# Patient Record
Sex: FEMALE | Race: BLACK OR AFRICAN AMERICAN | Employment: OTHER | URBAN - METROPOLITAN AREA
[De-identification: names, ages, dates, MRNs, and addresses within clinical notes are randomized per-mention and may not be internally consistent; named-entity substitution may affect disease eponyms.]

---

## 2017-01-05 ENCOUNTER — HOSPITAL ENCOUNTER (OUTPATIENT)
Dept: PHYSICAL THERAPY | Age: 62
Discharge: HOME | End: 2017-01-05
Payer: COMMERCIAL

## 2017-01-05 PROCEDURE — 97113 AQUATIC THERAPY/EXERCISES: CPT

## 2017-01-05 NOTE — PROGRESS NOTES
[facility] - [facility], [address] [place] [address]  [phone] fax    Progress Note  Patient name: Steven Nathan Start of Care: 2016   Referral source: Rach Guerrero MD : 1955    Medical Diagnosis: Chronic SI joint pain [M53.3, G89.29]  Lumbar pain [M54.5]  Lumbar radiculopathy [M54.16] Onset Date:Chronic, worsening past few months    Treatment Diagnosis: Low back, LE pain   Prior Hospitalization: see medical history Provider#: [id number]   Medications: Verified on Patient summary List   Comorbidities: arthritis, back pain, BMI over 30, diabetes, gastrointestinal disease, visual impairment  Prior Level of Function: Functionally independent, retired, helps out at [employer] center    Visits from [facility] of Care: 5    Missed Visits: 1    Established Goals:          Excellent Good         Limited           None  [x] Increased ROM   []  []  [x]  []  [x] Increased Strength  []  []  [x]  []  [x] Increased Mobility  []  []  [x]  []   [x] Decreased Pain   []  []  [x]  []  [] Decreased Swelling  []  []  []  []     Goal: Patient will initiate aquatic therapy without incident or increase in pain in order to progress toward long term goals. Status at last note/certification: N/A  Current status: Met  Long Term Goals: To be accomplished in 10 treatments:  Goal: Patient will improve FOTO assessment score to 51 in order to indicate improved functional abilities. Status at last note/certification: 33  Current status: Not met, 27  Goal: Patient will improve lumbar flexion and Left rotation AROM to WNL without increase in pain in order to more easily perform ADL.   Status at last note/certification: 11% of full with increased back/Left LE pain  Current status: Progressing, remains painful and limited  Goal: Patient will improve Left knee flexion/extension and hip flexion strength to 5/5 without increase in pain in order to improve stability with weight bearing activities. Status at last note/certification: knee extension 4+/5, flexion 4-/5, hip flexion 4/5 with pain  Current status: Progressing, knee flexion 5/5, knee extension 4/5, hip flexion 4-/5 all with pain  Goal: Patient will report worst back and leg pain as 6/10 or less in order to improve quality of sleep.   Status at last note/certification: 7/92  Current status: Progressing, 8.5/10    Key Functional Changes:   Functional Gains: some decreased pain at start of therapy  Functional Deficits: pain with movement, pain with stand/amb, pain with bending during ADL, occasionally requires help with ADL, recent increase in pain following operation for cancer biopsy, pain when in bed/recliner, pain into both LEs (was previously just Left but now into Right)  % improvement: limited  Pain   Average: 4.5/10       Best: 6-8/10     Worst: 8.5/10  Patient Goal: \"To be able to function like I did before\"    Updated Goals: to be achieved in 5 weeks:   Long term goals remain appropriate     ASSESSMENT/RECOMMENDATIONS:  [x]Continue therapy per initial plan/protocol at a frequency of  2 x per week for 5 weeks  []Continue therapy with the following recommended changes:_____________________      _____________________________________________________________________  []Discontinue therapy progressing towards or have reached established goals  []Discontinue therapy due to lack of appreciable progress towards goals  []Discontinue therapy due to lack of attendance or compliance  []Await Physician's recommendations/decisions regarding therapy  []Other:________________________________________________________________    Thank you for this referral.   Randy Pineda, PT 1/5/2017 1:01 PM  NOTE TO PHYSICIAN:  PLEASE COMPLETE THE ORDERS BELOW AND   FAX TO [facility]: [phone]  If you are unable to process this request in 24 hours please contact our office: [phone]  []  I have read the above report and request that my patient continue as recommended. []  I have read the above report and request that my patient continue therapy with the following changes/special instructions:________________________________________  []I have read the above report and request that my patient be discharged from therapy.     Physicians signature: ______________________________Date: ______Time:______

## 2017-01-10 ENCOUNTER — APPOINTMENT (OUTPATIENT)
Dept: PHYSICAL THERAPY | Age: 62
End: 2017-01-10
Payer: COMMERCIAL

## 2017-01-16 NOTE — PROGRESS NOTES
[facility] - [place] [address]  [phone] fax    Discharge Summary    Progress Note  Patient name: Kacey Berumen Start of Care: 2016   Referral source: Leonard Yeager MD : 1955    Medical Diagnosis: Chronic SI joint pain [M53.3, G89.29]  Lumbar pain [M54.5]  Lumbar radiculopathy [M54.16] Onset Date:Chronic, worsening past few months    Treatment Diagnosis: Low back, LE pain   Prior Hospitalization: see medical history Provider#: [id number]   Medications: Verified on Patient summary List   Comorbidities: arthritis, back pain, BMI over 30, diabetes, gastrointestinal disease, visual impairment  Prior Level of Function: Functionally independent, retired, helps out at [employer] center    Visits from [place] of Care: 5    Missed Visits: 1    Reporting Period : 16 to 17    Goal: Patient will improve FOTO assessment score to 51 in order to indicate improved functional abilities. Status at last note/certification: 33  Current status: Not met, 27    Goal: Patient will improve lumbar flexion and Left rotation AROM to WNL without increase in pain in order to more easily perform ADL. Status at last note/certification: 35% of full with increased back/Left LE pain  Current status: Progressing, remains painful and limited    Goal: Patient will improve Left knee flexion/extension and hip flexion strength to 5/5 without increase in pain in order to improve stability with weight bearing activities. Status at last note/certification: knee extension 4+/5, flexion 4-/5, hip flexion 4/5 with pain  Current status: Progressing, knee flexion 5/5, knee extension 4/5, hip flexion 4-/5 all with pain    Goal: Patient will report worst back and leg pain as 6/10 or less in order to improve quality of sleep.   Status at last note/certification: 0/33  Current status: Progressing, 8.5/10      Assessment/ Summary of Care: Patient attended aquatic therapy inconsistently for low back and LE pain, with limited noticeable improvement at most recent reassessment. At this time MD requests discharge from therapy. Above goals reflect status at most recent session. Should Patient require further treatment in the future we would be pleased to treat her with an updated script. Thank you for the referral of this Patient.      RECOMMENDATIONS:  [x]Discontinue therapy: []Patient has reached or is progressing toward set goals      []Patient is non-compliant or has abdicated      [x]Due to lack of appreciable progress towards set [facility], PT 1/16/2017 11:54 AM

## 2017-02-01 ENCOUNTER — APPOINTMENT (OUTPATIENT)
Dept: CT IMAGING | Age: 62
End: 2017-02-01
Attending: PHYSICIAN ASSISTANT
Payer: COMMERCIAL

## 2017-02-01 ENCOUNTER — HOSPITAL ENCOUNTER (EMERGENCY)
Age: 62
Discharge: HOME | End: 2017-02-01
Attending: EMERGENCY MEDICINE
Payer: COMMERCIAL

## 2017-02-01 VITALS
SYSTOLIC BLOOD PRESSURE: 113 MMHG | TEMPERATURE: 97.9 F | OXYGEN SATURATION: 98 % | HEIGHT: 67 IN | DIASTOLIC BLOOD PRESSURE: 63 MMHG | BODY MASS INDEX: 36.1 KG/M2 | HEART RATE: 91 BPM | WEIGHT: 230 LBS | RESPIRATION RATE: 18 BRPM

## 2017-02-01 DIAGNOSIS — K85.90 ACUTE PANCREATITIS WITHOUT INFECTION OR NECROSIS, UNSPECIFIED PANCREATITIS TYPE: ICD-10-CM

## 2017-02-01 DIAGNOSIS — R10.84 ABDOMINAL PAIN, GENERALIZED: Primary | ICD-10-CM

## 2017-02-01 DIAGNOSIS — R11.2 NON-INTRACTABLE VOMITING WITH NAUSEA, UNSPECIFIED VOMITING TYPE: ICD-10-CM

## 2017-02-01 DIAGNOSIS — K59.00 CONSTIPATION, UNSPECIFIED CONSTIPATION TYPE: ICD-10-CM

## 2017-02-01 LAB
ALBUMIN SERPL BCP-MCNC: 3.7 G/DL (ref 3.4–5)
ALBUMIN/GLOB SERPL: 0.8 {RATIO} (ref 0.8–1.7)
ALP SERPL-CCNC: 144 U/L (ref 45–117)
ALT SERPL-CCNC: 29 U/L (ref 13–56)
ANION GAP BLD CALC-SCNC: 13 MMOL/L (ref 3–18)
APPEARANCE UR: CLEAR
AST SERPL W P-5'-P-CCNC: 16 U/L (ref 15–37)
BACTERIA URNS QL MICRO: ABNORMAL /HPF
BASOPHILS # BLD AUTO: 0 K/UL (ref 0–0.06)
BASOPHILS # BLD: 0 % (ref 0–2)
BILIRUB SERPL-MCNC: 0.3 MG/DL (ref 0.2–1)
BILIRUB UR QL: NEGATIVE
BUN SERPL-MCNC: 11 MG/DL (ref 7–18)
BUN/CREAT SERPL: 13 (ref 12–20)
CALCIUM SERPL-MCNC: 9.5 MG/DL (ref 8.5–10.1)
CHLORIDE SERPL-SCNC: 102 MMOL/L (ref 100–108)
CO2 SERPL-SCNC: 25 MMOL/L (ref 21–32)
COLOR UR: YELLOW
CREAT SERPL-MCNC: 0.85 MG/DL (ref 0.6–1.3)
DIFFERENTIAL METHOD BLD: ABNORMAL
EOSINOPHIL # BLD: 0.1 K/UL (ref 0–0.4)
EOSINOPHIL NFR BLD: 1 % (ref 0–5)
EPITH CASTS URNS QL MICRO: ABNORMAL /LPF (ref 0–5)
ERYTHROCYTE [DISTWIDTH] IN BLOOD BY AUTOMATED COUNT: 15.9 % (ref 11.6–14.5)
GLOBULIN SER CALC-MCNC: 4.7 G/DL (ref 2–4)
GLUCOSE SERPL-MCNC: 172 MG/DL (ref 74–99)
GLUCOSE UR STRIP.AUTO-MCNC: >1000 MG/DL
HCT VFR BLD AUTO: 42.1 % (ref 35–45)
HGB BLD-MCNC: 13.4 G/DL (ref 12–16)
HGB UR QL STRIP: NEGATIVE
KETONES UR QL STRIP.AUTO: NEGATIVE MG/DL
LEUKOCYTE ESTERASE UR QL STRIP.AUTO: ABNORMAL
LIPASE SERPL-CCNC: 1153 U/L (ref 73–393)
LYMPHOCYTES # BLD AUTO: 27 % (ref 21–52)
LYMPHOCYTES # BLD: 2.8 K/UL (ref 0.9–3.6)
MCH RBC QN AUTO: 27.1 PG (ref 24–34)
MCHC RBC AUTO-ENTMCNC: 31.8 G/DL (ref 31–37)
MCV RBC AUTO: 85.2 FL (ref 74–97)
MONOCYTES # BLD: 0.7 K/UL (ref 0.05–1.2)
MONOCYTES NFR BLD AUTO: 7 % (ref 3–10)
NEUTS SEG # BLD: 6.6 K/UL (ref 1.8–8)
NEUTS SEG NFR BLD AUTO: 65 % (ref 40–73)
NITRITE UR QL STRIP.AUTO: NEGATIVE
PH UR STRIP: 5 [PH] (ref 5–8)
PLATELET # BLD AUTO: 270 K/UL (ref 135–420)
PMV BLD AUTO: 11.2 FL (ref 9.2–11.8)
POTASSIUM SERPL-SCNC: 3.9 MMOL/L (ref 3.5–5.5)
PROT SERPL-MCNC: 8.4 G/DL (ref 6.4–8.2)
PROT UR STRIP-MCNC: NEGATIVE MG/DL
RBC # BLD AUTO: 4.94 M/UL (ref 4.2–5.3)
RBC #/AREA URNS HPF: ABNORMAL /HPF (ref 0–5)
SODIUM SERPL-SCNC: 140 MMOL/L (ref 136–145)
SP GR UR REFRACTOMETRY: >1.03 (ref 1–1.03)
UROBILINOGEN UR QL STRIP.AUTO: 0.2 EU/DL (ref 0.2–1)
WBC # BLD AUTO: 10.2 K/UL (ref 4.6–13.2)
WBC URNS QL MICRO: ABNORMAL /HPF (ref 0–4)

## 2017-02-01 PROCEDURE — 83690 ASSAY OF LIPASE: CPT | Performed by: PHYSICIAN ASSISTANT

## 2017-02-01 PROCEDURE — 87186 SC STD MICRODIL/AGAR DIL: CPT | Performed by: PHYSICIAN ASSISTANT

## 2017-02-01 PROCEDURE — 80053 COMPREHEN METABOLIC PANEL: CPT | Performed by: PHYSICIAN ASSISTANT

## 2017-02-01 PROCEDURE — 96374 THER/PROPH/DIAG INJ IV PUSH: CPT

## 2017-02-01 PROCEDURE — 99283 EMERGENCY DEPT VISIT LOW MDM: CPT

## 2017-02-01 PROCEDURE — 87077 CULTURE AEROBIC IDENTIFY: CPT | Performed by: PHYSICIAN ASSISTANT

## 2017-02-01 PROCEDURE — 96375 TX/PRO/DX INJ NEW DRUG ADDON: CPT

## 2017-02-01 PROCEDURE — 74011636320 HC RX REV CODE- 636/320: Performed by: EMERGENCY MEDICINE

## 2017-02-01 PROCEDURE — 74177 CT ABD & PELVIS W/CONTRAST: CPT

## 2017-02-01 PROCEDURE — 87086 URINE CULTURE/COLONY COUNT: CPT | Performed by: PHYSICIAN ASSISTANT

## 2017-02-01 PROCEDURE — 85025 COMPLETE CBC W/AUTO DIFF WBC: CPT | Performed by: PHYSICIAN ASSISTANT

## 2017-02-01 PROCEDURE — 96361 HYDRATE IV INFUSION ADD-ON: CPT

## 2017-02-01 PROCEDURE — 74011250636 HC RX REV CODE- 250/636: Performed by: PHYSICIAN ASSISTANT

## 2017-02-01 PROCEDURE — 74011250636 HC RX REV CODE- 250/636

## 2017-02-01 PROCEDURE — 81001 URINALYSIS AUTO W/SCOPE: CPT | Performed by: PHYSICIAN ASSISTANT

## 2017-02-01 PROCEDURE — 96376 TX/PRO/DX INJ SAME DRUG ADON: CPT

## 2017-02-01 RX ORDER — SODIUM CHLORIDE 9 MG/ML
1000 INJECTION, SOLUTION INTRAVENOUS ONCE
Status: COMPLETED | OUTPATIENT
Start: 2017-02-01 | End: 2017-02-01

## 2017-02-01 RX ORDER — MORPHINE SULFATE 4 MG/ML
4 INJECTION, SOLUTION INTRAMUSCULAR; INTRAVENOUS
Status: COMPLETED | OUTPATIENT
Start: 2017-02-01 | End: 2017-02-01

## 2017-02-01 RX ORDER — MORPHINE SULFATE 4 MG/ML
INJECTION, SOLUTION INTRAMUSCULAR; INTRAVENOUS
Status: DISCONTINUED
Start: 2017-02-01 | End: 2017-02-01 | Stop reason: HOSPADM

## 2017-02-01 RX ORDER — ONDANSETRON 2 MG/ML
4 INJECTION INTRAMUSCULAR; INTRAVENOUS
Status: COMPLETED | OUTPATIENT
Start: 2017-02-01 | End: 2017-02-01

## 2017-02-01 RX ORDER — MORPHINE SULFATE 2 MG/ML
2 INJECTION, SOLUTION INTRAMUSCULAR; INTRAVENOUS
Status: COMPLETED | OUTPATIENT
Start: 2017-02-01 | End: 2017-02-01

## 2017-02-01 RX ORDER — ONDANSETRON 2 MG/ML
INJECTION INTRAMUSCULAR; INTRAVENOUS
Status: DISCONTINUED
Start: 2017-02-01 | End: 2017-02-01 | Stop reason: HOSPADM

## 2017-02-01 RX ORDER — OXYCODONE AND ACETAMINOPHEN 10; 325 MG/1; MG/1
1 TABLET ORAL
COMMUNITY
End: 2017-02-01

## 2017-02-01 RX ORDER — POLYETHYLENE GLYCOL 3350 17 G/17G
17 POWDER, FOR SOLUTION ORAL DAILY
Qty: 289 G | Refills: 0 | Status: SHIPPED | OUTPATIENT
Start: 2017-02-01

## 2017-02-01 RX ORDER — ACETAMINOPHEN AND CODEINE PHOSPHATE 300; 30 MG/1; MG/1
1 TABLET ORAL
Qty: 20 TAB | Refills: 0 | Status: SHIPPED | OUTPATIENT
Start: 2017-02-01 | End: 2018-10-18 | Stop reason: ALTCHOICE

## 2017-02-01 RX ADMIN — ONDANSETRON HYDROCHLORIDE 4 MG: 2 SOLUTION INTRAMUSCULAR; INTRAVENOUS at 15:10

## 2017-02-01 RX ADMIN — MORPHINE SULFATE 4 MG: 4 INJECTION, SOLUTION INTRAMUSCULAR; INTRAVENOUS at 15:11

## 2017-02-01 RX ADMIN — SODIUM CHLORIDE 1000 ML: 900 INJECTION, SOLUTION INTRAVENOUS at 15:13

## 2017-02-01 RX ADMIN — Medication 2 MG: at 16:43

## 2017-02-01 RX ADMIN — IOPAMIDOL 100 ML: 612 INJECTION, SOLUTION INTRAVENOUS at 15:05

## 2017-02-01 NOTE — DISCHARGE INSTRUCTIONS
Abdominal Pain: Care Instructions  Your Care Instructions    Abdominal pain has many possible causes. Some aren't serious and get better on their own in a few days. Others need more testing and treatment. If your pain continues or gets worse, you need to be rechecked and may need more tests to find out what is wrong. You may need surgery to correct the problem. Don't ignore new symptoms, such as fever, nausea and vomiting, urination problems, pain that gets worse, and dizziness. These may be signs of a more serious problem. Your doctor may have recommended a follow-up visit in the next 8 to 12 hours. If you are not getting better, you may need more tests or treatment. The doctor has checked you carefully, but problems can develop later. If you notice any problems or new symptoms, get medical treatment right away. Follow-up care is a key part of your treatment and safety. Be sure to make and go to all appointments, and call your doctor if you are having problems. It's also a good idea to know your test results and keep a list of the medicines you take. How can you care for yourself at home? · Rest until you feel better. · To prevent dehydration, drink plenty of fluids, enough so that your urine is light yellow or clear like water. Choose water and other caffeine-free clear liquids until you feel better. If you have kidney, heart, or liver disease and have to limit fluids, talk with your doctor before you increase the amount of fluids you drink. · If your stomach is upset, eat mild foods, such as rice, dry toast or crackers, bananas, and applesauce. Try eating several small meals instead of two or three large ones. · Wait until 48 hours after all symptoms have gone away before you have spicy foods, alcohol, and drinks that contain caffeine. · Do not eat foods that are high in fat. · Avoid anti-inflammatory medicines such as aspirin, ibuprofen (Advil, Motrin), and naproxen (Aleve).  These can cause stomach upset. Talk to your doctor if you take daily aspirin for another health problem. When should you call for help? Call 911 anytime you think you may need emergency care. For example, call if:  · You passed out (lost consciousness). · You pass maroon or very bloody stools. · You vomit blood or what looks like coffee grounds. · You have new, severe belly pain. Call your doctor now or seek immediate medical care if:  · Your pain gets worse, especially if it becomes focused in one area of your belly. · You have a new or higher fever. · Your stools are black and look like tar, or they have streaks of blood. · You have unexpected vaginal bleeding. · You have symptoms of a urinary tract infection. These may include:  ¨ Pain when you urinate. ¨ Urinating more often than usual.  ¨ Blood in your urine. · You are dizzy or lightheaded, or you feel like you may faint. Watch closely for changes in your health, and be sure to contact your doctor if:  · You are not getting better after 1 day (24 hours). Where can you learn more? Go to http://dianeEpiphytealmas.info/. Enter X968 in the search box to learn more about \"Abdominal Pain: Care Instructions. \"  Current as of: May 27, 2016  Content Version: 11.1  © 4065-3490 [employer]. Care instructions adapted under license by [employer] (which disclaims liability or warranty for this information). If you have questions about a medical condition or this instruction, always ask your healthcare professional. [place] [address] any warranty or liability for your use of this information. Constipation: Care Instructions  Your Care Instructions  Constipation means that you have a hard time passing stools (bowel movements). People pass stools from 3 times a day to once every 3 days. What is normal for you may be different. Constipation may occur with pain in the rectum and cramping.  The pain may get worse when you try to pass stools. Sometimes there are small amounts of bright red blood on toilet paper or the surface of stools. This is because of enlarged veins near the rectum (hemorrhoids). A few changes in your diet and lifestyle may help you avoid ongoing constipation. Your doctor may also prescribe medicine to help loosen your stool. Some medicines can cause constipation. These include pain medicines and antidepressants. Tell your doctor about all the medicines you take. Your doctor may want to make a medicine change to ease your symptoms. Follow-up care is a key part of your treatment and safety. Be sure to make and go to all appointments, and call your doctor if you are having problems. It's also a good idea to know your test results and keep a list of the medicines you take. How can you care for yourself at home? · Drink plenty of fluids, enough so that your urine is light yellow or clear like water. If you have kidney, heart, or liver disease and have to limit fluids, talk with your doctor before you increase the amount of fluids you drink. · Include high-fiber foods in your diet each day. These include fruits, vegetables, beans, and whole grains. · Get at least 30 minutes of exercise on most days of the week. Walking is a good choice. You also may want to do other activities, such as running, swimming, cycling, or playing tennis or team sports. · Take a fiber supplement, such as Citrucel or Metamucil, every day. Read and follow all instructions on the label. · Schedule time each day for a bowel movement. A daily routine may help. Take your time having your bowel movement. · Support your feet with a small step stool when you sit on the toilet. This helps flex your hips and places your pelvis in a squatting position. · Your doctor may recommend an over-the-counter laxative to relieve your constipation. Examples are Milk of Magnesia and MiraLax. Read and follow all instructions on the label.  Do not use laxatives on a long-term basis. When should you call for help? Call your doctor now or seek immediate medical care if:  · You have new or worse belly pain. · You have new or worse nausea or vomiting. · You have blood in your stools. Watch closely for changes in your health, and be sure to contact your doctor if:  · Your constipation is getting worse. · You do not get better as expected. Where can you learn more? Go to http://diane-almas.info/. Enter [phone] in the search box to learn more about \"Constipation: Care Instructions. \"  Current as of: May 27, 2016  Content Version: 11.1  © 1611-5158 [employer]. Care instructions adapted under license by [employer] (which disclaims liability or warranty for this information). If you have questions about a medical condition or this instruction, always ask your healthcare professional. [place] [address] any warranty or liability for your use of this information. [employer] Activation    Thank you for requesting access to [employer]. Please follow the instructions below to securely access and download your online medical record. [employer] allows you to send messages to your doctor, view your test results, renew your prescriptions, schedule appointments, and more. How Do I Sign Up? 1. In your internet browser, go to [email or web address]  2. Click on the First Time User? Click Here link in the Sign In box. You will be redirect to the New Member Sign Up page. 3. Enter your [employer] Access Code exactly as it appears below. You will not need to use this code after youve completed the sign-up process. If you do not sign up before the expiration date, you must request a new code. [employer] Access Code: Activation code not generated  Current [employer] Status: Patient Declined (This is the date your [employer] access code will )    4.  Enter the last four digits of your Social Security Number (xxxx) and Date of Birth (mm/dd/yyyy) as indicated and click Submit. You will be taken to the next sign-up page. 5. Create a [employer] ID. This will be your [employer] login ID and cannot be changed, so think of one that is secure and easy to remember. 6. Create a [employer] password. You can change your password at any time. 7. Enter your Password Reset Question and Answer. This can be used at a later time if you forget your password. 8. Enter your e-mail address. You will receive e-mail notification when new information is available in [address] 9. Click Sign Up. You can now view and download portions of your medical record. 10. Click the Download Summary menu link to download a portable copy of your medical information. Additional Information    If you have questions, please visit the Frequently Asked Questions section of the [employer] website at [email or web address] Remember, [employer] is NOT to be used for urgent needs. For medical emergencies, dial 911.

## 2017-02-01 NOTE — Clinical Note
Complete all medications as prescribed. Follow-up with primary care doctor/GI in 1 week. Return to the ED immediately for any new or worsening symptoms.

## 2017-02-01 NOTE — ED TRIAGE NOTES
Pt presents to ER c/o diffuse abdominal pain, onset yesterday. Pt states she vomited x 1 yesterday. Pt states she had EGD by Dr Santo Kawasaki (GI) a few weeks ago. Pt states h/o +H pylori, completed course antibiotics. Denies diarrhea.

## 2017-02-01 NOTE — ED PROVIDER NOTES
HPI Comments: 63 yr old female, hx DM and insomnia presents to the ED complaining of generalized abdominal pain and N/V x 1 day. Pt states she was recently seen by Dr. Simin Khan, GI specialist, who treated her for an h pylori infection. Pt states she completed those abx several weeks ago. Denies diarrhea, constipation, urinary sx, fever, chills, SOB, and chest pain. No other complaints. Patient is a 63 y.o. female presenting with abdominal pain. Abdominal Pain    Associated symptoms include nausea and vomiting. Pertinent negatives include no fever, no diarrhea, no constipation, no dysuria, no frequency, no hematuria, no headaches, no myalgias, no chest pain and no back pain. Past Medical History:   Diagnosis Date    Cataract     Diabetes ([facility] [address]     Insomnia     Musculoskeletal disorder      herniated disk; bilat feet; left thumb;     Neuropathy        Past Surgical History:   Procedure Laterality Date    Hx cholecystectomy      Pr temporal artery ligatn or bx Bilateral 2016     Dr. Hans Coronado Hx  section      Hx hysterectomy      Hx orthopaedic  20+     left thumb    Hx rotator cuff repair Left          Family History:   Problem Relation Age of Onset    Diabetes Mother     Hypertension Mother     Diabetes Father     Hypertension Father     Diabetes Brother        Social History     Social History    Marital status:      Spouse name: N/A    Number of children: N/A    Years of education: N/A     Occupational History    Not on file. Social History Main Topics    Smoking status: Current Every Day Smoker     Packs/day: 0.50    Smokeless tobacco: Never Used    Alcohol use No    Drug use: No    Sexual activity: Not Currently     Other Topics Concern    Not on file     Social History Narrative         ALLERGIES: Pcn [penicillins]    Review of Systems   Constitutional: Negative for chills, diaphoresis, fatigue and fever.    HENT: Negative for congestion, ear pain, rhinorrhea and sore throat. Eyes: Negative for pain and redness. Respiratory: Negative for cough, shortness of breath, wheezing and stridor. Cardiovascular: Negative for chest pain, palpitations and leg swelling. Gastrointestinal: Positive for abdominal pain, nausea and vomiting. Negative for constipation and diarrhea. Genitourinary: Negative for dysuria, flank pain, frequency and hematuria. Musculoskeletal: Negative for back pain, myalgias, neck pain and neck stiffness. Skin: Negative for rash and wound. Neurological: Negative for dizziness, seizures, syncope, light-headedness, numbness and headaches. All other systems reviewed and are negative. Vitals:    02/01/17 1430   BP: 114/71   Pulse: 98   Resp: 20   Temp: 97.9 °F (36.6 °C)   SpO2: 96%   Weight: 104.3 kg (230 lb)   Height: 5' 7\" (1.702 m)            Physical Exam   Constitutional: She is oriented to person, place, and time. She appears well-developed and well-nourished. She appears distressed. Obese, appears moderately distressed   HENT:   Head: Normocephalic. Neck: Normal range of motion. Neck supple. Cardiovascular: Normal rate, regular rhythm and normal heart sounds. Exam reveals no gallop and no friction rub. No murmur heard. Pulmonary/Chest: Effort normal and breath sounds normal. No stridor. No respiratory distress. She has no wheezes. She has no rales. Abdominal: Soft. Bowel sounds are normal. She exhibits no distension and no mass. There is tenderness. There is guarding. There is no rebound. Diffuse abdominal TTP with guarding noted. Musculoskeletal: Normal range of motion. Neurological: She is alert and oriented to person, place, and time. Gait is steady. Able to ambulate without difficulty. Skin: Skin is warm and dry. No rash noted. She is not diaphoretic. No erythema. Psychiatric: She has a normal mood and affect.  Her behavior is normal. Thought content normal.   Nursing note and vitals reviewed. MDM  Number of Diagnoses or Management Options  Abdominal pain, generalized:   Acute pancreatitis without infection or necrosis, unspecified pancreatitis type:   Constipation, unspecified constipation type:   Non-intractable vomiting with nausea, unspecified vomiting type:   Diagnosis management comments: Impression:  Abdominal pain generalized, N/V, pancreatitis, constipation     IV inserted 1 L saline bolus, 4 mg zofran and 4 mg morphine given   RDW 15.9, glucose 179, GLOB 4.7, , TP 8.4, lipase 1153, UA: spec gravity > 1.030, glucose > 1000, small leuk esterase, 1+ bacteria     CT abd pelvis: Constipation with moderate to large amount of stool in proximal and mid colon  and moderate stool in distal colon. No evidence of colonic  diverticulosis/diverticulitis. Normal appendix posterior to cecum and ascending colon. Nonspecific small amount of gas and mildly increases fluid in distal small  bowel, probably secondary to constipation. Status post cholecystectomy. Status post hysterectomy. No obvious urinary calculus or obstructive uropathy. Additional negative findings or benign findings as described in body of report. Progress pt sx improving, but still reports pain. Will give one more dose of morphine in the ED before d/c. Discussed CT results with the pt and provided a copy of the CT report. Patient is stable for discharge at this time. Rx for tylenol w codeine and miralax given. Pt has zorfan at home. Advised to hold metformin for 2 days as she just had IV contrast for her CT. Explained to the pt that narcotics can worsen constipation and should used sparingly in her case. Rest and follow-up with PCP/GI this week. Return to the ED immediately for any new or worsening sx.   Kamryn Ramos PA-C 4:24 PM        Amount and/or Complexity of Data Reviewed  Clinical lab tests: ordered and reviewed  Tests in the radiology section of CPT®: ordered and reviewed    Risk of Complications, Morbidity, and/or Mortality  Presenting problems: moderate  Diagnostic procedures: moderate  Management options: moderate    Patient Progress  Patient progress: improved    ED Course       Procedures

## 2017-02-04 LAB
BACTERIA SPEC CULT: NORMAL
BACTERIA SPEC CULT: NORMAL
SERVICE CMNT-IMP: NORMAL

## 2017-02-12 NOTE — PROGRESS NOTES
Both culture results less than 100,000 colonies. During visit, pt denied urinary symptoms. UA with 2+ epithelial cells. This is likely contamination versus asymptomatic bacteriuria. No indication to treat with abx.

## 2017-03-10 ENCOUNTER — ANESTHESIA EVENT (OUTPATIENT)
Dept: ENDOSCOPY | Age: 62
End: 2017-03-10
Payer: COMMERCIAL

## 2017-03-13 ENCOUNTER — SURGERY (OUTPATIENT)
Age: 62
End: 2017-03-13

## 2017-03-13 ENCOUNTER — ANESTHESIA (OUTPATIENT)
Dept: ENDOSCOPY | Age: 62
End: 2017-03-13
Payer: COMMERCIAL

## 2017-03-13 PROCEDURE — 74011000250 HC RX REV CODE- 250

## 2017-03-13 PROCEDURE — 74011250636 HC RX REV CODE- 250/636

## 2017-03-13 RX ORDER — PROPOFOL 10 MG/ML
INJECTION, EMULSION INTRAVENOUS AS NEEDED
Status: DISCONTINUED | OUTPATIENT
Start: 2017-03-13 | End: 2017-03-13 | Stop reason: HOSPADM

## 2017-03-13 RX ORDER — LIDOCAINE HYDROCHLORIDE 20 MG/ML
INJECTION, SOLUTION EPIDURAL; INFILTRATION; INTRACAUDAL; PERINEURAL AS NEEDED
Status: DISCONTINUED | OUTPATIENT
Start: 2017-03-13 | End: 2017-03-13 | Stop reason: HOSPADM

## 2017-03-13 RX ADMIN — LIDOCAINE HYDROCHLORIDE 60 MG: 20 INJECTION, SOLUTION EPIDURAL; INFILTRATION; INTRACAUDAL; PERINEURAL at 10:29

## 2017-03-13 RX ADMIN — PROPOFOL 100 MG: 10 INJECTION, EMULSION INTRAVENOUS at 10:49

## 2017-03-13 RX ADMIN — PROPOFOL 100 MG: 10 INJECTION, EMULSION INTRAVENOUS at 10:59

## 2017-03-13 RX ADMIN — PROPOFOL 50 MG: 10 INJECTION, EMULSION INTRAVENOUS at 10:29

## 2017-03-13 NOTE — ANESTHESIA POSTPROCEDURE EVALUATION
Post-Anesthesia Evaluation and Assessment    Patient: Winston Evans MRN: [id number]  SSN: [id number]    YOB: 1955  Age: 63 y.o. Sex: female      Data from PACU flowsheet    Cardiovascular Function/Vital Signs  Visit Vitals    /72    Pulse 72    Temp 36.8 °C (98.2 °F)    Resp 18    Ht 5' 7\" (1.702 m)    Wt 103.4 kg (228 lb)    SpO2 100%    Breastfeeding No    BMI 35.71 kg/m2       Patient is status post MAC anesthesia for Procedure(s):  COLONOSCOPY polypectomy. Nausea/Vomiting: controlled    Postoperative hydration reviewed and adequate. Pain:  Pain Scale 1: Numeric (0 - 10) (03/13/17 0925)  Pain Intensity 1: 7 (03/13/17 0925)   Managed      Mental Status and Level of Consciousness: Alert and oriented     Pulmonary Status:   O2 Device: CO2 nasal cannula (03/13/17 1102)   Adequate oxygenation and airway patent    Complications related to anesthesia: None    Post-anesthesia assessment completed.  No concerns    Signed By: Bong Little MD     March 13, 2017

## 2017-03-13 NOTE — ANESTHESIA PREPROCEDURE EVALUATION
Anesthetic History   No history of anesthetic complications            Review of Systems / Medical History  Patient summary reviewed and pertinent labs reviewed    Pulmonary  Within defined limits                 Neuro/Psych   Within defined limits           Cardiovascular  Within defined limits                     GI/Hepatic/Renal  Within defined limits              Endo/Other    Diabetes: well controlled, type 2, using insulin    Arthritis     Other Findings   Comments: Current Smoker? YES       Elective Surgery? Yes       Abstained from smoking 24 hours prior to anesthesia? NO    Risk Factors for Postoperative nausea/vomiting:       History of postoperative nausea/vomiting? NO       Female? YES       Motion sickness? NO       Intended opioid administration for postoperative analgesia?   NO           Physical Exam    Airway  Mallampati: II  TM Distance: 4 - 6 cm  Neck ROM: normal range of motion   Mouth opening: Normal     Cardiovascular  Regular rate and rhythm,  S1 and S2 normal,  no murmur, click, rub, or gallop             Dental         Pulmonary                 Abdominal  GI exam deferred       Other Findings            Anesthetic Plan    ASA: 3  Anesthesia type: MAC          Induction: Intravenous  Anesthetic plan and risks discussed with: Patient

## 2017-04-19 ENCOUNTER — HOSPITAL ENCOUNTER (OUTPATIENT)
Age: 62
Discharge: HOME | End: 2017-04-19
Attending: PHYSICIAN ASSISTANT
Payer: COMMERCIAL

## 2017-04-19 DIAGNOSIS — M54.16 LUMBAR RADICULOPATHY: ICD-10-CM

## 2017-04-19 PROCEDURE — 72148 MRI LUMBAR SPINE W/O DYE: CPT

## 2017-06-19 ENCOUNTER — HOSPITAL ENCOUNTER (EMERGENCY)
Age: 62
Discharge: HOME | End: 2017-06-19
Attending: EMERGENCY MEDICINE
Payer: COMMERCIAL

## 2017-06-19 ENCOUNTER — APPOINTMENT (OUTPATIENT)
Dept: CT IMAGING | Age: 62
End: 2017-06-19
Attending: EMERGENCY MEDICINE
Payer: COMMERCIAL

## 2017-06-19 VITALS
RESPIRATION RATE: 23 BRPM | WEIGHT: 230 LBS | BODY MASS INDEX: 36.02 KG/M2 | HEART RATE: 87 BPM | OXYGEN SATURATION: 98 % | SYSTOLIC BLOOD PRESSURE: 113 MMHG | DIASTOLIC BLOOD PRESSURE: 67 MMHG | TEMPERATURE: 98.3 F

## 2017-06-19 DIAGNOSIS — R91.1 PULMONARY NODULE: ICD-10-CM

## 2017-06-19 DIAGNOSIS — K52.9 GASTROENTERITIS: Primary | ICD-10-CM

## 2017-06-19 LAB
ALBUMIN SERPL BCP-MCNC: 3.6 G/DL (ref 3.4–5)
ALBUMIN/GLOB SERPL: 0.8 {RATIO} (ref 0.8–1.7)
ALP SERPL-CCNC: 180 U/L (ref 45–117)
ALT SERPL-CCNC: 16 U/L (ref 13–56)
ANION GAP BLD CALC-SCNC: 11 MMOL/L (ref 3–18)
AST SERPL W P-5'-P-CCNC: 9 U/L (ref 15–37)
BASOPHILS # BLD AUTO: 0 K/UL (ref 0–0.1)
BASOPHILS # BLD: 0 % (ref 0–2)
BILIRUB SERPL-MCNC: 0.4 MG/DL (ref 0.2–1)
BUN SERPL-MCNC: 16 MG/DL (ref 7–18)
BUN/CREAT SERPL: 15 (ref 12–20)
CALCIUM SERPL-MCNC: 9.6 MG/DL (ref 8.5–10.1)
CHLORIDE SERPL-SCNC: 110 MMOL/L (ref 100–108)
CO2 SERPL-SCNC: 21 MMOL/L (ref 21–32)
CREAT SERPL-MCNC: 1.08 MG/DL (ref 0.6–1.3)
DIFFERENTIAL METHOD BLD: ABNORMAL
EOSINOPHIL # BLD: 0 K/UL (ref 0–0.4)
EOSINOPHIL NFR BLD: 0 % (ref 0–5)
ERYTHROCYTE [DISTWIDTH] IN BLOOD BY AUTOMATED COUNT: 15.5 % (ref 11.6–14.5)
GLOBULIN SER CALC-MCNC: 4.3 G/DL (ref 2–4)
GLUCOSE SERPL-MCNC: 240 MG/DL (ref 74–99)
HCT VFR BLD AUTO: 40 % (ref 35–45)
HGB BLD-MCNC: 13.6 G/DL (ref 12–16)
LIPASE SERPL-CCNC: 249 U/L (ref 73–393)
LYMPHOCYTES # BLD AUTO: 11 % (ref 21–52)
LYMPHOCYTES # BLD: 1.3 K/UL (ref 0.9–3.6)
MCH RBC QN AUTO: 29.2 PG (ref 24–34)
MCHC RBC AUTO-ENTMCNC: 34 G/DL (ref 31–37)
MCV RBC AUTO: 86 FL (ref 74–97)
MONOCYTES # BLD: 0.9 K/UL (ref 0.05–1.2)
MONOCYTES NFR BLD AUTO: 7 % (ref 3–10)
NEUTS SEG # BLD: 9.8 K/UL (ref 1.8–8)
NEUTS SEG NFR BLD AUTO: 82 % (ref 40–73)
PLATELET # BLD AUTO: 250 K/UL (ref 135–420)
PMV BLD AUTO: 11.3 FL (ref 9.2–11.8)
POTASSIUM SERPL-SCNC: 3.7 MMOL/L (ref 3.5–5.5)
PROT SERPL-MCNC: 7.9 G/DL (ref 6.4–8.2)
RBC # BLD AUTO: 4.65 M/UL (ref 4.2–5.3)
SODIUM SERPL-SCNC: 142 MMOL/L (ref 136–145)
WBC # BLD AUTO: 12 K/UL (ref 4.6–13.2)

## 2017-06-19 PROCEDURE — 74011250636 HC RX REV CODE- 250/636: Performed by: EMERGENCY MEDICINE

## 2017-06-19 PROCEDURE — 96361 HYDRATE IV INFUSION ADD-ON: CPT

## 2017-06-19 PROCEDURE — 99284 EMERGENCY DEPT VISIT MOD MDM: CPT

## 2017-06-19 PROCEDURE — 74011636320 HC RX REV CODE- 636/320: Performed by: EMERGENCY MEDICINE

## 2017-06-19 PROCEDURE — 83690 ASSAY OF LIPASE: CPT | Performed by: EMERGENCY MEDICINE

## 2017-06-19 PROCEDURE — 85025 COMPLETE CBC W/AUTO DIFF WBC: CPT | Performed by: EMERGENCY MEDICINE

## 2017-06-19 PROCEDURE — 80053 COMPREHEN METABOLIC PANEL: CPT | Performed by: EMERGENCY MEDICINE

## 2017-06-19 PROCEDURE — 74177 CT ABD & PELVIS W/CONTRAST: CPT

## 2017-06-19 PROCEDURE — 96374 THER/PROPH/DIAG INJ IV PUSH: CPT

## 2017-06-19 PROCEDURE — 96375 TX/PRO/DX INJ NEW DRUG ADDON: CPT

## 2017-06-19 PROCEDURE — 74011250637 HC RX REV CODE- 250/637: Performed by: EMERGENCY MEDICINE

## 2017-06-19 RX ORDER — DICYCLOMINE HYDROCHLORIDE 10 MG/1
10 CAPSULE ORAL
Status: COMPLETED | OUTPATIENT
Start: 2017-06-19 | End: 2017-06-19

## 2017-06-19 RX ORDER — SODIUM CHLORIDE 9 MG/ML
1000 INJECTION, SOLUTION INTRAVENOUS ONCE
Status: COMPLETED | OUTPATIENT
Start: 2017-06-19 | End: 2017-06-19

## 2017-06-19 RX ORDER — DICYCLOMINE HYDROCHLORIDE 10 MG/1
10 CAPSULE ORAL 4 TIMES DAILY
Qty: 20 CAP | Refills: 0 | Status: SHIPPED | OUTPATIENT
Start: 2017-06-19 | End: 2017-06-24

## 2017-06-19 RX ORDER — ONDANSETRON 2 MG/ML
4 INJECTION INTRAMUSCULAR; INTRAVENOUS
Status: COMPLETED | OUTPATIENT
Start: 2017-06-19 | End: 2017-06-19

## 2017-06-19 RX ORDER — MORPHINE SULFATE 4 MG/ML
4 INJECTION, SOLUTION INTRAMUSCULAR; INTRAVENOUS
Status: COMPLETED | OUTPATIENT
Start: 2017-06-19 | End: 2017-06-19

## 2017-06-19 RX ORDER — ONDANSETRON 4 MG/1
TABLET, ORALLY DISINTEGRATING ORAL
Qty: 10 TAB | Refills: 0 | Status: SHIPPED | OUTPATIENT
Start: 2017-06-19 | End: 2019-09-01

## 2017-06-19 RX ADMIN — DICYCLOMINE HYDROCHLORIDE 10 MG: 10 CAPSULE ORAL at 14:04

## 2017-06-19 RX ADMIN — Medication 4 MG: at 12:09

## 2017-06-19 RX ADMIN — ONDANSETRON 4 MG: 2 INJECTION INTRAMUSCULAR; INTRAVENOUS at 12:04

## 2017-06-19 RX ADMIN — SODIUM CHLORIDE 1000 ML: 900 INJECTION, SOLUTION INTRAVENOUS at 12:04

## 2017-06-19 RX ADMIN — IOPAMIDOL 100 ML: 612 INJECTION, SOLUTION INTRAVENOUS at 13:49

## 2017-06-19 NOTE — ED NOTES
Patient reports vomiting approximately 6 times and 13 episodes of diarrhea which includes 3 times since she has been here.

## 2017-06-19 NOTE — DISCHARGE INSTRUCTIONS
Gastroenteritis: Care Instructions  Your Care Instructions  Gastroenteritis is an illness that may cause nausea, vomiting, and diarrhea. It is sometimes called \"stomach flu. \" It can be caused by bacteria or a virus. You will probably begin to feel better in 1 to 2 days. In the meantime, get plenty of rest and make sure you do not become dehydrated. Dehydration occurs when your body loses too much fluid. Follow-up care is a key part of your treatment and safety. Be sure to make and go to all appointments, and call your doctor if you are having problems. Its also a good idea to know your test results and keep a list of the medicines you take. How can you care for yourself at home? · If your doctor prescribed antibiotics, take them as directed. Do not stop taking them just because you feel better. You need to take the full course of antibiotics. · Drink plenty of fluids to prevent dehydration, enough so that your urine is light yellow or clear like water. Choose water and other caffeine-free clear liquids until you feel better. If you have kidney, heart, or liver disease and have to limit fluids, talk with your doctor before you increase your fluid intake. · Drink fluids slowly, in frequent, small amounts, because drinking too much too fast can cause vomiting. · Begin eating mild foods, such as dry toast, yogurt, applesauce, bananas, and rice. Avoid spicy, hot, or high-fat foods, and do not drink alcohol or caffeine for a day or two. Do not drink milk or eat ice cream until you are feeling better. How to prevent gastroenteritis  · Keep hot foods hot and cold foods cold. · Do not eat meats, dressings, salads, or other foods that have been kept at room temperature for more than 2 hours. · Use a thermometer to check your refrigerator. It should be between 34°F and 40°F.  · Defrost meats in the refrigerator or microwave, not on the kitchen counter. · Keep your hands and your kitchen clean.  Wash your hands, cutting boards, and countertops with hot soapy water frequently. · Cook meat until it is well done. · Do not eat raw eggs or uncooked sauces made with raw eggs. · Do not take chances. If food looks or tastes spoiled, throw it out. When should you call for help? Call 911 anytime you think you may need emergency care. For example, call if:  · You vomit blood or what looks like coffee grounds. · You passed out (lost consciousness). · You pass maroon or very bloody stools. Call your doctor now or seek immediate medical care if:  · You have severe belly pain. · You have signs of needing more fluids. You have sunken eyes, a dry mouth, and pass only a little dark urine. · You feel like you are going to faint. · You have increased belly pain that does not go away in 1 to 2 days. · You have new or increased nausea, or you are vomiting. · You have a new or higher fever. · Your stools are black and tarlike or have streaks of blood. Watch closely for changes in your health, and be sure to contact your doctor if:  · You are dizzy or lightheaded. · You urinate less than usual, or your urine is dark yellow or brown. · You do not feel better with each day that goes by. Where can you learn more? Go to http://diane-almas.info/. Enter N142 in the search box to learn more about \"Gastroenteritis: Care Instructions. \"  Current as of: May 24, 2016  Content Version: 11.2  © 2940-7988 [employer]. Care instructions adapted under license by [employer] (which disclaims liability or warranty for this information). If you have questions about a medical condition or this instruction, always ask your healthcare professional. [address] any warranty or liability for your use of this information.

## 2017-06-19 NOTE — ED NOTES
I have reviewed discharge instructions with the patient. The patient verbalized understanding. Discharged home ambulatory, accompanied by family, in stable condition.

## 2017-06-19 NOTE — ED PROVIDER NOTES
HPI Comments: 11:49 AM Huang Covert is a 63 y.o. female with a hx of DM who presents to the ED c/o NVD that began yesterday. The pt is also complaining of abd pain and describes her pain as a \"constant burning\" and \"stabbing\" at times. The pt was advised by her GI DrLorie Camacho to come to the ED today. She reports vomiting 6 x and 10 episodes of diarrhea prior to ED arrival;, since the pt has been in the ED she has had 3 episodes of diarrhea. The pt denies blood in stool, dysuria, recent abx or recent hospitalization, and contact with sick individuals. No other complaints or concerns at this time. PCP:  Santy Cai MD      The history is provided by the patient. Past Medical History:   Diagnosis Date    Cataract     Chronic pain     lower back pain    Diabetes ([address]     Insomnia     Musculoskeletal disorder     herniated disk; bilat feet; left thumb;     Neuropathy        Past Surgical History:   Procedure Laterality Date    COLONOSCOPY N/A 3/13/2017    COLONOSCOPY polypectomy performed by Iline Duverney, [facility]    HX CATARACT REMOVAL Left 2016    HX  SECTION      HX CHOLECYSTECTOMY      HX HYSTERECTOMY      HX ORTHOPAEDIC  20+    left thumb    HX ROTATOR CUFF REPAIR Left     TEMPORAL ARTERY LIGATN OR BX Bilateral 2016    Dr. Rito Griffith         Family History:   Problem Relation Age of Onset    Diabetes Mother     Hypertension Mother     Diabetes Father     Hypertension Father     Diabetes Brother        Social History     Social History    Marital status:      Spouse name: N/A    Number of children: N/A    Years of education: N/A     Occupational History    Not on file.      Social History Main Topics    Smoking status: Current Every Day Smoker     Packs/day: 0.50    Smokeless tobacco: Never Used    Alcohol use No    Drug use: No    Sexual activity: Not Currently     Other Topics Concern    Not on file     Social History Narrative         ALLERGIES: Pcn [penicillins]    Review of Systems   Constitutional: Negative for diaphoresis and fever. Respiratory: Negative for shortness of breath. Gastrointestinal: Positive for abdominal pain, diarrhea, nausea and vomiting. Negative for blood in stool. Genitourinary: Negative for difficulty urinating, dysuria, flank pain and hematuria. All other systems reviewed and are negative. Vitals:    06/19/17 1245 06/19/17 1300 06/19/17 1315 06/19/17 1415   BP: 121/68 120/53 135/84 113/67   Pulse: 100 88 90 87   Resp: 23 16 15 23   Temp:       SpO2: 98% 98% 100% 98%   Weight:                Physical Exam   Constitutional: She is oriented to person, place, and time. She appears well-developed and well-nourished. HENT:   Head: Normocephalic and atraumatic. Neck: Neck supple. No JVD present. Cardiovascular: Normal rate and regular rhythm. Pulmonary/Chest: Effort normal and breath sounds normal. No respiratory distress. Abdominal: Soft. She exhibits no distension. There is generalized tenderness. There is no rebound and no guarding. Abd tenderness is slightly worse in epigastric region   Musculoskeletal: She exhibits no edema. Neurological: She is alert and oriented to person, place, and time. Skin: Skin is warm and dry. No erythema.    Psychiatric: Judgment normal.        MDM  Number of Diagnoses or Management Options  Diagnosis management comments: Pt with N/V/D  No cdiff risk factors  Check basic labs, supportive care    Consider CT        Amount and/or Complexity of Data Reviewed  Clinical lab tests: ordered and reviewed  Tests in the radiology section of CPT®: ordered and reviewed      ED Course       Procedures    Vitals:  Patient Vitals for the past 12 hrs:   Temp Pulse Resp BP SpO2   06/19/17 1415 - 87 23 113/67 98 %   06/19/17 1315 - 90 15 135/84 100 %   06/19/17 1300 - 88 16 120/53 98 %   06/19/17 1245 - 100 23 121/68 98 %   06/19/17 1230 - 90 23 119/73 99 % 06/19/17 1215 - 91 20 136/69 99 %   06/19/17 1200 - 96 22 129/75 99 %   06/19/17 1145 - 99 19 119/80 100 %   06/19/17 1121 98.3 °F (36.8 °C) (!) 108 20 125/85 98 %   98% on RA, indicating adequate oxygenation. Medications ordered:   Medications   0.9% sodium chloride infusion 1,000 mL (1,000 mL IntraVENous New Bag 6/19/17 1204)   ondansetron (ZOFRAN) injection 4 mg (4 mg IntraVENous Given 6/19/17 1204)   morphine injection 4 mg (4 mg IntraVENous Given 6/19/17 1209)   iopamidol (ISOVUE 300) 61 % contrast injection  mL (100 mL IntraVENous Given 6/19/17 1349)   dicyclomine (BENTYL) capsule 10 mg (10 mg Oral Given 6/19/17 1404)         Lab findings:  Recent Results (from the past 12 hour(s))   CBC WITH AUTOMATED DIFF    Collection Time: 06/19/17 11:54 AM   Result Value Ref Range    WBC 12.0 4.6 - 13.2 K/uL    RBC 4.65 4.20 - 5.30 M/uL    HGB 13.6 12.0 - 16.0 g/dL    HCT 40.0 35.0 - 45.0 %    MCV 86.0 74.0 - 97.0 FL    MCH 29.2 24.0 - 34.0 PG    MCHC 34.0 31.0 - 37.0 g/dL    RDW 15.5 (H) 11.6 - 14.5 %    PLATELET 700 029 - 780 K/uL    MPV 11.3 9.2 - 11.8 FL    NEUTROPHILS 82 (H) 40 - 73 %    LYMPHOCYTES 11 (L) 21 - 52 %    MONOCYTES 7 3 - 10 %    EOSINOPHILS 0 0 - 5 %    BASOPHILS 0 0 - 2 %    ABS. NEUTROPHILS 9.8 (H) 1.8 - 8.0 K/UL    ABS. LYMPHOCYTES 1.3 0.9 - 3.6 K/UL    ABS. MONOCYTES 0.9 0.05 - 1.2 K/UL    ABS. EOSINOPHILS 0.0 0.0 - 0.4 K/UL    ABS.  BASOPHILS 0.0 0.0 - 0.1 K/UL    DF AUTOMATED     METABOLIC PANEL, COMPREHENSIVE    Collection Time: 06/19/17 11:54 AM   Result Value Ref Range    Sodium 142 136 - 145 mmol/L    Potassium 3.7 3.5 - 5.5 mmol/L    Chloride 110 (H) 100 - 108 mmol/L    CO2 21 21 - 32 mmol/L    Anion gap 11 3.0 - 18 mmol/L    Glucose 240 (H) 74 - 99 mg/dL    BUN 16 7.0 - 18 MG/DL    Creatinine 1.08 0.6 - 1.3 MG/DL    BUN/Creatinine ratio 15 12 - 20      GFR est AA >60 >60 ml/min/1.73m2    GFR est non-AA 52 (L) >60 ml/min/1.73m2    Calcium 9.6 8.5 - 10.1 MG/DL    Bilirubin, total 0.4 0.2 - 1.0 MG/DL    ALT (SGPT) 16 13 - 56 U/L    AST (SGOT) 9 (L) 15 - 37 U/L    Alk. phosphatase 180 (H) 45 - 117 U/L    Protein, total 7.9 6.4 - 8.2 g/dL    Albumin 3.6 3.4 - 5.0 g/dL    Globulin 4.3 (H) 2.0 - 4.0 g/dL    A-G Ratio 0.8 0.8 - 1.7     LIPASE    Collection Time: 06/19/17 11:54 AM   Result Value Ref Range    Lipase 249 73 - 393 U/L         X-Ray, CT or other radiology findings or impressions:  CT ABD PELV W CONT   Final Result      Enlarging 15 mm right middle lobe pulmonary nodule. A dedicated CT of the chest  is recommended to evaluate for additional nodules. Consider additional PET  imaging if there is concern for malignancy.     No imaging findings of pancreatitis.     Top normal to mildly dilated bowel loops in the left upper quadrant with mild  wall thickening, possible enteritis.     Tiny left renal cortical hypodense lesion which is too small to further  characterize but stable.     Cholecystectomy.     Small fat-containing umbilical and inguinal hernias, stable. Progress notes, Consult notes or additional Procedure notes:  Discussed results with pt, stable for dc home. Discussed return precautions. Disposition:  Diagnosis:   1. Gastroenteritis    2. Pulmonary nodule        Disposition: discharged    [address] [facility]  Documented by: Dania cristinaing for, and in the presence of, Rocky Lux DO 06/19/17 2:39 PM     Signed by: Higinio Alvarado, 06/19/17 12:01 PM     PROVIDER ATTESTATION STATEMENT  I personally performed the services described in the documentation, reviewed the documentation, as recorded by the scribe in my presence, and it accurately and completely records my words and actions.   Rocky Lux DO

## 2017-09-26 ENCOUNTER — HOSPITAL ENCOUNTER (OUTPATIENT)
Dept: LAB | Age: 62
Discharge: HOME | End: 2017-09-26
Payer: COMMERCIAL

## 2017-09-26 PROCEDURE — 80307 DRUG TEST PRSMV CHEM ANLYZR: CPT

## 2017-09-29 LAB
Lab: NORMAL
REFERENCE LAB,REFLB: NORMAL
TEST DESCRIPTION:,ATST: NORMAL

## 2018-09-21 ENCOUNTER — HOSPITAL ENCOUNTER (EMERGENCY)
Age: 63
Discharge: HOME | End: 2018-09-21
Attending: EMERGENCY MEDICINE
Payer: COMMERCIAL

## 2018-09-21 ENCOUNTER — APPOINTMENT (OUTPATIENT)
Dept: GENERAL RADIOLOGY | Age: 63
End: 2018-09-21
Attending: EMERGENCY MEDICINE
Payer: COMMERCIAL

## 2018-09-21 VITALS
RESPIRATION RATE: 16 BRPM | DIASTOLIC BLOOD PRESSURE: 80 MMHG | WEIGHT: 228 LBS | HEART RATE: 84 BPM | SYSTOLIC BLOOD PRESSURE: 120 MMHG | TEMPERATURE: 98.4 F | OXYGEN SATURATION: 98 % | BODY MASS INDEX: 35.71 KG/M2

## 2018-09-21 DIAGNOSIS — S86.912A STRAIN OF LEFT KNEE, INITIAL ENCOUNTER: Primary | ICD-10-CM

## 2018-09-21 LAB
ANION GAP SERPL CALC-SCNC: 8 MMOL/L (ref 3–18)
BASOPHILS # BLD: 0 K/UL (ref 0–0.1)
BASOPHILS NFR BLD: 0 % (ref 0–2)
BUN SERPL-MCNC: 16 MG/DL (ref 7–18)
BUN/CREAT SERPL: 21 (ref 12–20)
CALCIUM SERPL-MCNC: 9.8 MG/DL (ref 8.5–10.1)
CHLORIDE SERPL-SCNC: 105 MMOL/L (ref 100–108)
CO2 SERPL-SCNC: 27 MMOL/L (ref 21–32)
CREAT SERPL-MCNC: 0.75 MG/DL (ref 0.6–1.3)
D DIMER PPP FEU-MCNC: 0.37 UG/ML(FEU)
DIFFERENTIAL METHOD BLD: ABNORMAL
EOSINOPHIL # BLD: 0 K/UL (ref 0–0.4)
EOSINOPHIL NFR BLD: 0 % (ref 0–5)
ERYTHROCYTE [DISTWIDTH] IN BLOOD BY AUTOMATED COUNT: 14.4 % (ref 11.6–14.5)
GLUCOSE SERPL-MCNC: 130 MG/DL (ref 74–99)
HCT VFR BLD AUTO: 39.2 % (ref 35–45)
HGB BLD-MCNC: 12.5 G/DL (ref 12–16)
LYMPHOCYTES # BLD: 4 K/UL (ref 0.9–3.6)
LYMPHOCYTES NFR BLD: 41 % (ref 21–52)
MCH RBC QN AUTO: 28 PG (ref 24–34)
MCHC RBC AUTO-ENTMCNC: 31.9 G/DL (ref 31–37)
MCV RBC AUTO: 87.9 FL (ref 74–97)
MONOCYTES # BLD: 0.6 K/UL (ref 0.05–1.2)
MONOCYTES NFR BLD: 6 % (ref 3–10)
NEUTS SEG # BLD: 5.2 K/UL (ref 1.8–8)
NEUTS SEG NFR BLD: 53 % (ref 40–73)
PLATELET # BLD AUTO: 284 K/UL (ref 135–420)
PMV BLD AUTO: 11.1 FL (ref 9.2–11.8)
POTASSIUM SERPL-SCNC: 3.8 MMOL/L (ref 3.5–5.5)
RBC # BLD AUTO: 4.46 M/UL (ref 4.2–5.3)
SODIUM SERPL-SCNC: 140 MMOL/L (ref 136–145)
WBC # BLD AUTO: 9.8 K/UL (ref 4.6–13.2)

## 2018-09-21 PROCEDURE — 73564 X-RAY EXAM KNEE 4 OR MORE: CPT

## 2018-09-21 PROCEDURE — 85025 COMPLETE CBC W/AUTO DIFF WBC: CPT | Performed by: EMERGENCY MEDICINE

## 2018-09-21 PROCEDURE — 96372 THER/PROPH/DIAG INJ SC/IM: CPT

## 2018-09-21 PROCEDURE — 99283 EMERGENCY DEPT VISIT LOW MDM: CPT

## 2018-09-21 PROCEDURE — 74011250636 HC RX REV CODE- 250/636: Performed by: EMERGENCY MEDICINE

## 2018-09-21 PROCEDURE — 85379 FIBRIN DEGRADATION QUANT: CPT | Performed by: EMERGENCY MEDICINE

## 2018-09-21 PROCEDURE — 80048 BASIC METABOLIC PNL TOTAL CA: CPT | Performed by: EMERGENCY MEDICINE

## 2018-09-21 RX ORDER — KETOROLAC TROMETHAMINE 30 MG/ML
60 INJECTION, SOLUTION INTRAMUSCULAR; INTRAVENOUS
Status: COMPLETED | OUTPATIENT
Start: 2018-09-21 | End: 2018-09-21

## 2018-09-21 RX ORDER — KETOROLAC TROMETHAMINE 10 MG/1
10 TABLET, FILM COATED ORAL
Qty: 14 TAB | Refills: 0 | Status: SHIPPED | OUTPATIENT
Start: 2018-09-21 | End: 2019-09-01

## 2018-09-21 RX ADMIN — KETOROLAC TROMETHAMINE 60 MG: 30 INJECTION, SOLUTION INTRAMUSCULAR at 03:30

## 2018-09-21 NOTE — ED TRIAGE NOTES
Patient states she ran out of her Percocet x 1 week ago and states she got them from Dr. Jose Alejandro Lin.

## 2018-09-21 NOTE — ED PROVIDER NOTES
HPI Comments: Pt c/o chronic left knee and low back pain. But says back of leg mid leg pain x one week. Says drove to [place] last week and back 2-3 days ago. Says h/o dvt 40 yrs ago.  + smoker. Not curr on anticoag. No fever. No weakness or numbness. Says on percocet from dr Shannon White for knee pain, but ran out one week ago, requests refill. No recent injury. No rash or redness. Patient is a 66 y.o. female presenting with knee pain and knee swelling. Knee Pain Pertinent negatives include no back pain. Knee Swelling Pertinent negatives include no back pain. Past Medical History:  
Diagnosis Date  Cataract  Chronic pain   
 lower back pain  Diabetes ([address]  Insomnia  Musculoskeletal disorder   
 herniated disk; bilat feet; left thumb;   
 Neuropathy Past Surgical History:  
Procedure Laterality Date  COLONOSCOPY N/A 3/13/2017 COLONOSCOPY polypectomy performed by Alexander Olivas MD at [address] HX CATARACT REMOVAL Left 2016  HX  SECTION    
 HX CHOLECYSTECTOMY  HX HYSTERECTOMY  HX ORTHOPAEDIC  20+  
 left thumb  HX ROTATOR CUFF REPAIR Left  TEMPORAL ARTERY LIGATN OR BX Bilateral 2016 Dr. Esther Ignacio Family History:  
Problem Relation Age of Onset  Diabetes Mother  Hypertension Mother  Diabetes Father  Hypertension Father  Diabetes Brother Social History Social History  Marital status:  Spouse name: N/A  
 Number of children: N/A  
 Years of education: N/A Occupational History  Not on file. Social History Main Topics  Smoking status: Current Every Day Smoker Packs/day: 0.50  Smokeless tobacco: Never Used  Alcohol use No  
 Drug use: No  
 Sexual activity: Not Currently Other Topics Concern  Not on file Social History Narrative ALLERGIES: Pcn [penicillins] Review of Systems Constitutional: Negative for fever. HENT: Negative for congestion. Respiratory: Negative for cough and shortness of breath. Cardiovascular: Negative for chest pain. Gastrointestinal: Negative for abdominal pain and vomiting. Musculoskeletal: Positive for arthralgias and myalgias. Negative for back pain. Skin: Negative for rash. Neurological: Negative for light-headedness. All other systems reviewed and are negative. Vitals:  
 09/21/18 0302 BP: 120/80 Pulse: 84 Resp: 16 Temp: 98.4 °F (36.9 °C) SpO2: 98% Weight: 103.4 kg (228 lb) Physical Exam  
Constitutional: She is oriented to person, place, and time. She appears well-developed. HENT:  
Head: Normocephalic. Mouth/Throat: Oropharynx is clear and moist.  
Eyes: Pupils are equal, round, and reactive to light. Neck: Normal range of motion. Cardiovascular: Normal rate and normal heart sounds. No murmur heard. Pulmonary/Chest: Effort normal. She has no wheezes. She has no rales. Abdominal: Soft. There is no tenderness. Musculoskeletal: Normal range of motion. She exhibits tenderness (+ left ant/med knee ttp. + left prox post calf/distal post thigh ttp/mild swelling. nvi inc 2+ dp pulse . no other leg ttp). She exhibits no edema. Neurological: She is alert and oriented to person, place, and time. Skin: Skin is warm and dry. Nursing note and vitals reviewed. [facility] 
 
 
ED Course Procedures Vitals: 
Patient Vitals for the past 12 hrs: 
 Temp Pulse Resp BP SpO2  
09/21/18 0302 98.4 °F (36.9 °C) 84 16 120/80 98 % Medications ordered:  
Medications  
ketorolac tromethamine (TORADOL) 60 mg/2 mL injection 60 mg (60 mg IntraMUSCular Given 9/21/18 0330) Lab findings: 
Recent Results (from the past 12 hour(s)) CBC WITH AUTOMATED DIFF Collection Time: 09/21/18  3:30 AM  
Result Value Ref Range WBC 9.8 4.6 - 13.2 K/uL  
 RBC 4.46 4.20 - 5.30 M/uL  
 HGB 12.5 12.0 - 16.0 g/dL HCT 39.2 35.0 - 45.0 % MCV 87.9 74.0 - 97.0 FL  
 MCH 28.0 24.0 - 34.0 PG  
 MCHC 31.9 31.0 - 37.0 g/dL  
 RDW 14.4 11.6 - 14.5 % PLATELET 485 911 - 252 K/uL MPV 11.1 9.2 - 11.8 FL  
 NEUTROPHILS 53 40 - 73 % LYMPHOCYTES 41 21 - 52 % MONOCYTES 6 3 - 10 % EOSINOPHILS 0 0 - 5 % BASOPHILS 0 0 - 2 %  
 ABS. NEUTROPHILS 5.2 1.8 - 8.0 K/UL  
 ABS. LYMPHOCYTES 4.0 (H) 0.9 - 3.6 K/UL  
 ABS. MONOCYTES 0.6 0.05 - 1.2 K/UL  
 ABS. EOSINOPHILS 0.0 0.0 - 0.4 K/UL  
 ABS. BASOPHILS 0.0 0.0 - 0.1 K/UL  
 DF AUTOMATED METABOLIC PANEL, BASIC Collection Time: 09/21/18  3:30 AM  
Result Value Ref Range Sodium 140 136 - 145 mmol/L Potassium 3.8 3.5 - 5.5 mmol/L Chloride 105 100 - 108 mmol/L  
 CO2 27 21 - 32 mmol/L Anion gap 8 3.0 - 18 mmol/L Glucose 130 (H) 74 - 99 mg/dL BUN 16 7.0 - 18 MG/DL Creatinine 0.75 0.6 - 1.3 MG/DL  
 BUN/Creatinine ratio 21 (H) 12 - 20 GFR est AA >60 >60 ml/min/1.73m2 GFR est non-AA >60 >60 ml/min/1.73m2 Calcium 9.8 8.5 - 10.1 MG/DL  
D DIMER Collection Time: 09/21/18  3:30 AM  
Result Value Ref Range D DIMER 0.37 <0.46 ug/ml(FEU) X-Ray, CT or other radiology findings or impressions: 
XR KNEE LT MIN 4 V    (Results Pending) Progress notes, Consult notes or additional Procedure notes: . 
3:27 AM eval pt  website, pt evidence of pain management, monthly percocet rx's for last 2 yrs. On questioning, pt states pain man for chronic low back pain. Explained we couldn't refill percocet, but could eval for emc. Neg w/u for emc. Not c/w pad/dvt/cellulitis. Stable for dc and close f/u. Disposition: 
Diagnosis: 1. Strain of left knee, initial encounter Disposition: homd 
 
Follow-up Information Follow up With Details Comments Contact Info Safia Silvestre MD Schedule an appointment as soon as possible for a visit in 2 days  [address] [id number] [phone] Guilherme Babb MD Schedule an appointment as soon as possible for a visit  [address] [facility] 
[phone] Patient's Medications Start Taking KETOROLAC (TORADOL) 10 MG TABLET    Take 1 Tab by mouth every eight (8) hours as needed for Pain. Continue Taking ACETAMINOPHEN-CODEINE (TYLENOL-CODEINE #3) 300-30 MG PER TABLET    Take 1 Tab by mouth every four (4) hours as needed for Pain. Max Daily Amount: 6 Tabs. AMITRIPTYLINE (ELAVIL) 25 MG TABLET    Take 50 mg by mouth nightly as needed. ASPIRIN DELAYED-RELEASE 81 MG TABLET    Take  by mouth daily. ATORVASTATIN (LIPITOR) 10 MG TABLET    TK 1 T PO HS  
 CARISOPRODOL (SOMA) 350 MG TABLET    Take 350 mg by mouth every eight (8) hours as needed. CHOLECALCIFEROL, VITAMIN D3, (VITAMIN D3) 2,000 UNIT TAB    Take  by mouth daily. DEXTRAN 70-HYPROMELLOSE (ARTIFICIAL TEARS,[id number],) OPHTHALMIC SOLUTION    Administer  to both eyes as needed. EMPAGLIFLOZIN (JARDIANCE PO)    Take  by mouth daily. FOLIC ACID/MULTIVIT-MIN/LUTEIN (CENTRUM SILVER PO)    Take  by mouth daily. GLIPIZIDE (GLUCOTROL) 10 MG TABLET    Take 10 mg by mouth two (2) times a day. LOSARTAN (COZAAR) 25 MG TABLET    TK 1 T PO D METFORMIN (GLUCOPHAGE) 1,000 MG TABLET    Take 1,000 mg by mouth two (2) times a day. MULTIVITAMIN, TX-IRON-CA-MIN (THERA-M W/ IRON) 9 MG IRON-400 MCG TAB TABLET    Take 1 Tab by mouth daily. OMEPRAZOLE (PRILOSEC) 40 MG CAPSULE    Take 40 mg by mouth daily. ONDANSETRON (ZOFRAN ODT) 4 MG DISINTEGRATING TABLET    Take 1-2 tablets every 6-8 hours as needed for nausea and vomiting. OTHER    DDS Lumbar Traction Belt Use as directed POLYETHYLENE GLYCOL (MIRALAX) 17 GRAM/DOSE POWDER    Take 17 g by mouth daily. 1 tablespoon with 8 oz of water daily PREGABALIN (LYRICA) 150 MG CAPSULE    Take 1 Cap by mouth three (3) times daily. Max Daily Amount: 450 mg. These Medications have changed No medications on file Stop Taking No medications on file

## 2018-09-21 NOTE — ED NOTES
Medication teaching given on Toradol. Patient verbalized understanding of. Encouraged patient to voice any concerns with reassurance provided.

## 2018-09-21 NOTE — DISCHARGE INSTRUCTIONS
Return for any new pain, fever, any shortness of breath, vomiting, decreased fluid intake, weakness, numbness, dizziness, or any change or concerns.

## 2018-10-18 ENCOUNTER — HOSPITAL ENCOUNTER (OUTPATIENT)
Age: 63
Discharge: HOME | End: 2018-10-18
Attending: PHYSICIAN ASSISTANT
Payer: COMMERCIAL

## 2018-10-18 ENCOUNTER — OFFICE VISIT (OUTPATIENT)
Dept: CARDIOLOGY CLINIC | Age: 63
End: 2018-10-18

## 2018-10-18 VITALS
BODY MASS INDEX: 35.94 KG/M2 | OXYGEN SATURATION: 98 % | WEIGHT: 229 LBS | DIASTOLIC BLOOD PRESSURE: 76 MMHG | HEIGHT: 67 IN | SYSTOLIC BLOOD PRESSURE: 130 MMHG | HEART RATE: 63 BPM

## 2018-10-18 DIAGNOSIS — E78.5 DYSLIPIDEMIA: ICD-10-CM

## 2018-10-18 DIAGNOSIS — I25.119 ATHEROSCLEROSIS OF NATIVE CORONARY ARTERY OF NATIVE HEART WITH ANGINA PECTORIS (HCC): ICD-10-CM

## 2018-10-18 DIAGNOSIS — M17.12 PRIMARY OSTEOARTHRITIS OF LEFT KNEE: ICD-10-CM

## 2018-10-18 DIAGNOSIS — M25.562 LEFT KNEE PAIN: ICD-10-CM

## 2018-10-18 DIAGNOSIS — R06.02 SOB (SHORTNESS OF BREATH): ICD-10-CM

## 2018-10-18 DIAGNOSIS — Z72.0 TOBACCO ABUSE: ICD-10-CM

## 2018-10-18 DIAGNOSIS — E11.9 CONTROLLED TYPE 2 DIABETES MELLITUS WITHOUT COMPLICATION, WITHOUT LONG-TERM CURRENT USE OF INSULIN (HCC): ICD-10-CM

## 2018-10-18 DIAGNOSIS — R07.9 CHEST PAIN, UNSPECIFIED TYPE: Primary | ICD-10-CM

## 2018-10-18 PROBLEM — E66.01 SEVERE OBESITY (HCC): Status: ACTIVE | Noted: 2018-10-18

## 2018-10-18 PROCEDURE — 73721 MRI JNT OF LWR EXTRE W/O DYE: CPT

## 2018-10-18 RX ORDER — ATORVASTATIN CALCIUM 40 MG/1
40 TABLET, FILM COATED ORAL DAILY
Qty: 30 TAB | Refills: 6 | Status: SHIPPED | OUTPATIENT
Start: 2018-10-18

## 2018-10-18 NOTE — PROGRESS NOTES
HISTORY OF PRESENT ILLNESS  Sal Townsend is a 66 y.o. female. HPI    Patient presents for a new office visit. She was referred here for evaluation of an abnormal CT scan of her thorax which demonstrated calcified coronary arteries, which was an incidental finding. She was also found to have a calcified 1.6 cm nodule adjacent to her right heart border within the mediastinum. The patient is a longtime smoker with a 40-pack-year smoking history and is still smoking half a pack of cigarettes a day. She also has a history of type 2 diabetes, borderline hypertension and dyslipidemia. She does mention intermittent chest pain, substernal in nature which she describes as both a sharp stabbing pain in a separate chest pressure which is usually nonexertional in nature and not particularly worse with exertion. The symptoms can last for seconds to minutes at a time. They usually resolve when she relaxes. The chest pain has been on and off for the past 3 months. She is also been complaining of increased dyspnea on exertion over the past 6+ months. She denies any orthopnea, PND or leg swelling. No heart palpitations, dizziness or syncope. Past Medical History:   Diagnosis Date    Cataract     Chronic pain     lower back pain    Diabetes ([address]     Insomnia     Musculoskeletal disorder     herniated disk; bilat feet; left thumb;     Neuropathy      Current Outpatient Medications   Medication Sig Dispense Refill    oxyCODONE IR 5 mg tab 10 mg, acetaminophen 325 mg tab 325 mg Take  by mouth every four (4) hours as needed.  atorvastatin (LIPITOR) 40 mg tablet Take 1 Tab by mouth daily. 30 Tab 6    ketorolac (TORADOL) 10 mg tablet Take 1 Tab by mouth every eight (8) hours as needed for Pain. 14 Tab 0    ondansetron (ZOFRAN ODT) 4 mg disintegrating tablet Take 1-2 tablets every 6-8 hours as needed for nausea and vomiting. 10 Tab 0    omeprazole (PRILOSEC) 40 mg capsule Take 40 mg by mouth daily.       polyethylene glycol (MIRALAX) 17 gram/dose powder Take 17 g by mouth daily. 1 tablespoon with 8 oz of water daily 289 g 0    losartan (COZAAR) 25 mg tablet TK 1 T PO D  3    aspirin delayed-release 81 mg tablet Take  by mouth daily.  FOLIC ACID/MULTIVIT-MIN/LUTEIN (CENTRUM SILVER PO) Take  by mouth daily.  carisoprodol (SOMA) 350 mg tablet Take 350 mg by mouth every eight (8) hours as needed.  amitriptyline (ELAVIL) 25 mg tablet Take 50 mg by mouth nightly as needed.  OTHER DDS Lumbar Traction Belt  Use as directed 1 Device prn    metFORMIN (GLUCOPHAGE) 1,000 mg tablet Take 1,000 mg by mouth two (2) times a day.  glipiZIDE (GLUCOTROL) 10 mg tablet Take 10 mg by mouth two (2) times a day. Allergies   Allergen Reactions    Pcn [Penicillins] Rash      Social History     Tobacco Use    Smoking status: Current Every Day Smoker     Packs/day: 0.50    Smokeless tobacco: Never Used   Substance Use Topics    Alcohol use: No     Alcohol/week: 0.0 oz    Drug use: No     Family History   Problem Relation Age of Onset    Diabetes Mother     Hypertension Mother     Diabetes Father     Hypertension Father     Diabetes Brother          Review of Systems   Constitutional: Negative for chills, fever and weight loss. HENT: Negative for nosebleeds. Eyes: Negative for blurred vision and double vision. Respiratory: Positive for shortness of breath. Negative for cough and wheezing. Cardiovascular: Positive for chest pain. Negative for palpitations, orthopnea, claudication, leg swelling and PND. Gastrointestinal: Negative for abdominal pain, heartburn, nausea and vomiting. Genitourinary: Negative for dysuria and hematuria. Musculoskeletal: Negative for falls and myalgias. Skin: Negative for rash. Neurological: Negative for dizziness, focal weakness and headaches. Endo/Heme/Allergies: Does not bruise/bleed easily. Psychiatric/Behavioral: Negative for substance abuse. Physical Exam   Constitutional: She is oriented to person, place, and time. She appears well-developed and well-nourished. HENT:   Head: Normocephalic and atraumatic. Eyes: Conjunctivae are normal.   Neck: Neck supple. No JVD present. Carotid bruit is not present. Cardiovascular: Normal rate, regular rhythm, S1 normal, S2 normal and normal pulses. Exam reveals distant heart sounds. Exam reveals no gallop. No murmur heard. Pulmonary/Chest: Effort normal. She has no wheezes. She has rhonchi ( Few scattered). She has no rales. Abdominal: Soft. Bowel sounds are normal. There is no tenderness. Musculoskeletal: She exhibits no edema. Neurological: She is alert and oriented to person, place, and time. Skin: Skin is warm and dry. EKG: Normal sinus rhythm, normal axis, normal QTc interval, no ST or T wave or mass concerning for ischemia. Borderline inferior Q waves, not meeting criteria for prior infarct. No change compared to her prior EKG from earlier this year. ASSESSMENT and PLAN  Encounter Diagnoses   Name Primary?  Chest pain, unspecified type Yes    SOB (shortness of breath)     Controlled type 2 diabetes mellitus without complication, without long-term current use of insulin (HCC)     Dyslipidemia     Atherosclerosis of native coronary artery of native heart with angina pectoris (HCC)     Tobacco abuse      Chest pain. Somewhat atypical for angina, but given her multiple cardiac risk factors and her calcified coronaries on a CT scan, I have recommended a pharmacologic nuclear stress test for further risk stratification. Dyspnea on exertion. This may be related to her chronic lung disease, but could also be ischemic in nature, so a stress test was ordered and I would also like to arrange for an echocardiogram to evaluate her LV systolic and diastolic function. I would also like to assess for any significant pulmonary hypertension. Dyslipidemia.   Patient is currently taking atorvastatin 10 mg daily. She states that when she was living in [place] she was on 40 mg of this. Due to her calcific coronary disease, recommend that she increase this back up to 40 mg daily and I would prefer LDL to be less than 70. Tobacco use disorder. Patient is currently smoking half a pack of cigarettes a day. She has a nearly 40-pack-year smoking history. She is encouraged to quit smoking completely. Abnormal CT of her thorax. I recommended that we  obtain a copy of this for us to review.     Follow-up in 3 months, sooner if needed

## 2018-10-18 NOTE — PROGRESS NOTES
Sal Townsend presents today for   Chief Complaint   Patient presents with    New Patient     referred by [facility] for chest pain and SOB    Chest Pain (Angina)     twice a week for 4 months on/off stabbiing in the center towards the left side     Shortness of Breath     everyday       Sal Townsend preferred language for health care discussion is english/other. Is someone accompanying this pt? Yes, spouse    Is the patient using any DME equipment during [address]? No    Depression Screening:  PHQ over the last two weeks 10/7/2015   Little interest or pleasure in doing things Not at all   Feeling down, depressed, irritable, or hopeless Not at all   Total Score PHQ 2 0       Learning Assessment:  Learning Assessment 10/7/2015   PRIMARY LEARNER Patient   HIGHEST LEVEL OF EDUCATION - PRIMARY LEARNER  -   BARRIERS PRIMARY LEARNER -   PRIMARY LANGUAGE ENGLISH   LEARNER PREFERENCE PRIMARY DEMONSTRATION     -   ANSWERED BY patient   RELATIONSHIP SELF       Abuse Screening:  Abuse Screening Questionnaire 10/7/2015   Do you ever feel afraid of your partner? N   Are you in a relationship with someone who physically or mentally threatens you? N   Is it safe for you to go home? Y       Fall Risk  Fall Risk Assessment, last 12 mths 10/7/2015   Able to walk? Yes   Fall in past 12 months? No       Pt currently taking Antiplatelet therapy? Aspirin    Coordination of Care:  1. Have you been to the ER, urgent care clinic since your last visit? Hospitalized since your last visit? 9/2018 for swelling   2. Have you seen or consulted any other health care providers outside of the [address] [place] [address] since your last visit? Include any pap smears or colon screening.  No

## 2018-10-31 ENCOUNTER — DOCUMENTATION ONLY (OUTPATIENT)
Dept: CARDIOLOGY CLINIC | Age: 63
End: 2018-10-31

## 2018-10-31 NOTE — PROGRESS NOTES
Office note and EKG from 10/18/18 faxed through [facility] to Dr. Minerva Cortez.

## 2018-11-05 ENCOUNTER — APPOINTMENT (OUTPATIENT)
Dept: GENERAL RADIOLOGY | Age: 63
End: 2018-11-05
Attending: EMERGENCY MEDICINE
Payer: COMMERCIAL

## 2018-11-05 ENCOUNTER — HOSPITAL ENCOUNTER (EMERGENCY)
Age: 63
Discharge: HOME | End: 2018-11-05
Attending: EMERGENCY MEDICINE | Admitting: EMERGENCY MEDICINE
Payer: COMMERCIAL

## 2018-11-05 VITALS
OXYGEN SATURATION: 99 % | HEIGHT: 67 IN | WEIGHT: 229 LBS | DIASTOLIC BLOOD PRESSURE: 90 MMHG | HEART RATE: 66 BPM | TEMPERATURE: 98.8 F | BODY MASS INDEX: 35.94 KG/M2 | SYSTOLIC BLOOD PRESSURE: 148 MMHG | RESPIRATION RATE: 17 BRPM

## 2018-11-05 DIAGNOSIS — J06.9 ACUTE UPPER RESPIRATORY INFECTION: Primary | ICD-10-CM

## 2018-11-05 LAB
ALBUMIN SERPL-MCNC: 3.5 G/DL (ref 3.4–5)
ALBUMIN/GLOB SERPL: 0.7 {RATIO} (ref 0.8–1.7)
ALP SERPL-CCNC: 191 U/L (ref 45–117)
ALT SERPL-CCNC: 23 U/L (ref 13–56)
ANION GAP SERPL CALC-SCNC: 9 MMOL/L (ref 3–18)
APPEARANCE UR: CLEAR
AST SERPL-CCNC: 15 U/L (ref 15–37)
ATRIAL RATE: 75 BPM
BACTERIA URNS QL MICRO: ABNORMAL /HPF
BASOPHILS # BLD: 0 K/UL (ref 0–0.1)
BASOPHILS NFR BLD: 0 % (ref 0–2)
BILIRUB SERPL-MCNC: 0.2 MG/DL (ref 0.2–1)
BILIRUB UR QL: NEGATIVE
BNP SERPL-MCNC: 30 PG/ML (ref 0–900)
BUN SERPL-MCNC: 11 MG/DL (ref 7–18)
BUN/CREAT SERPL: 12 (ref 12–20)
CALCIUM SERPL-MCNC: 9.4 MG/DL (ref 8.5–10.1)
CALCULATED P AXIS, ECG09: 61 DEGREES
CALCULATED R AXIS, ECG10: 49 DEGREES
CALCULATED T AXIS, ECG11: 39 DEGREES
CHLORIDE SERPL-SCNC: 106 MMOL/L (ref 100–108)
CK MB CFR SERPL CALC: NORMAL % (ref 0–4)
CK MB SERPL-MCNC: <1 NG/ML (ref 5–25)
CK SERPL-CCNC: 110 U/L (ref 26–192)
CO2 SERPL-SCNC: 25 MMOL/L (ref 21–32)
COLOR UR: YELLOW
CREAT SERPL-MCNC: 0.92 MG/DL (ref 0.6–1.3)
DIAGNOSIS, 93000: NORMAL
DIFFERENTIAL METHOD BLD: NORMAL
EOSINOPHIL # BLD: 0 K/UL (ref 0–0.4)
EOSINOPHIL NFR BLD: 0 % (ref 0–5)
EPITH CASTS URNS QL MICRO: ABNORMAL /LPF (ref 0–5)
ERYTHROCYTE [DISTWIDTH] IN BLOOD BY AUTOMATED COUNT: 14.3 % (ref 11.6–14.5)
GLOBULIN SER CALC-MCNC: 4.8 G/DL (ref 2–4)
GLUCOSE SERPL-MCNC: 231 MG/DL (ref 74–99)
GLUCOSE UR STRIP.AUTO-MCNC: NEGATIVE MG/DL
HCT VFR BLD AUTO: 38.7 % (ref 35–45)
HGB BLD-MCNC: 12.6 G/DL (ref 12–16)
HGB UR QL STRIP: NEGATIVE
KETONES UR QL STRIP.AUTO: NEGATIVE MG/DL
LEUKOCYTE ESTERASE UR QL STRIP.AUTO: NEGATIVE
LYMPHOCYTES # BLD: 3 K/UL (ref 0.9–3.6)
LYMPHOCYTES NFR BLD: 33 % (ref 21–52)
MCH RBC QN AUTO: 28.1 PG (ref 24–34)
MCHC RBC AUTO-ENTMCNC: 32.6 G/DL (ref 31–37)
MCV RBC AUTO: 86.2 FL (ref 74–97)
MONOCYTES # BLD: 0.3 K/UL (ref 0.05–1.2)
MONOCYTES NFR BLD: 3 % (ref 3–10)
MUCOUS THREADS URNS QL MICRO: ABNORMAL /LPF
NEUTS SEG # BLD: 5.6 K/UL (ref 1.8–8)
NEUTS SEG NFR BLD: 64 % (ref 40–73)
NITRITE UR QL STRIP.AUTO: NEGATIVE
P-R INTERVAL, ECG05: 162 MS
PH UR STRIP: 5 [PH] (ref 5–8)
PLATELET # BLD AUTO: 276 K/UL (ref 135–420)
PMV BLD AUTO: 10.5 FL (ref 9.2–11.8)
POTASSIUM SERPL-SCNC: 4 MMOL/L (ref 3.5–5.5)
PROT SERPL-MCNC: 8.3 G/DL (ref 6.4–8.2)
PROT UR STRIP-MCNC: ABNORMAL MG/DL
Q-T INTERVAL, ECG07: 384 MS
QRS DURATION, ECG06: 94 MS
QTC CALCULATION (BEZET), ECG08: 428 MS
RBC # BLD AUTO: 4.49 M/UL (ref 4.2–5.3)
RBC #/AREA URNS HPF: NEGATIVE /HPF (ref 0–5)
SODIUM SERPL-SCNC: 140 MMOL/L (ref 136–145)
SP GR UR REFRACTOMETRY: 1.03 (ref 1–1.03)
TROPONIN I SERPL-MCNC: <0.02 NG/ML (ref 0–0.04)
TROPONIN I SERPL-MCNC: <0.02 NG/ML (ref 0–0.04)
UROBILINOGEN UR QL STRIP.AUTO: 1 EU/DL (ref 0.2–1)
VENTRICULAR RATE, ECG03: 75 BPM
WBC # BLD AUTO: 8.9 K/UL (ref 4.6–13.2)
WBC URNS QL MICRO: ABNORMAL /HPF (ref 0–4)

## 2018-11-05 PROCEDURE — 99285 EMERGENCY DEPT VISIT HI MDM: CPT

## 2018-11-05 PROCEDURE — 80053 COMPREHEN METABOLIC PANEL: CPT | Performed by: EMERGENCY MEDICINE

## 2018-11-05 PROCEDURE — 74011250637 HC RX REV CODE- 250/637: Performed by: EMERGENCY MEDICINE

## 2018-11-05 PROCEDURE — 85025 COMPLETE CBC W/AUTO DIFF WBC: CPT | Performed by: EMERGENCY MEDICINE

## 2018-11-05 PROCEDURE — 93005 ELECTROCARDIOGRAM TRACING: CPT

## 2018-11-05 PROCEDURE — 96374 THER/PROPH/DIAG INJ IV PUSH: CPT

## 2018-11-05 PROCEDURE — 83880 ASSAY OF NATRIURETIC PEPTIDE: CPT | Performed by: EMERGENCY MEDICINE

## 2018-11-05 PROCEDURE — 81001 URINALYSIS AUTO W/SCOPE: CPT | Performed by: EMERGENCY MEDICINE

## 2018-11-05 PROCEDURE — 71046 X-RAY EXAM CHEST 2 VIEWS: CPT

## 2018-11-05 PROCEDURE — 82550 ASSAY OF CK (CPK): CPT | Performed by: EMERGENCY MEDICINE

## 2018-11-05 PROCEDURE — 74011250636 HC RX REV CODE- 250/636: Performed by: EMERGENCY MEDICINE

## 2018-11-05 RX ORDER — GUAIFENESIN 100 MG/5ML
324 LIQUID (ML) ORAL
Status: COMPLETED | OUTPATIENT
Start: 2018-11-05 | End: 2018-11-05

## 2018-11-05 RX ADMIN — ASPIRIN 81 MG CHEWABLE TABLET 243 MG: 81 TABLET CHEWABLE at 09:24

## 2018-11-05 RX ADMIN — METHYLPREDNISOLONE SODIUM SUCCINATE 125 MG: 125 INJECTION, POWDER, FOR SOLUTION INTRAMUSCULAR; INTRAVENOUS at 12:29

## 2018-11-05 NOTE — ED PROVIDER NOTES
EMERGENCY DEPARTMENT HISTORY AND PHYSICAL EXAM 
 
10:31 AM 
 
 
Date: 11/5/2018 Patient Name: Ron Ames History of Presenting Illness Chief Complaint Patient presents with  Shortness of Breath  Chest Pain History Provided By: Patient Chief Complaint: SOB Duration:  \"this morning\" Timing:  Constant Quality: Tight Severity: \"feels like throat closed up\" Modifying Factors: No modifying factors reported Associated Symptoms: denies cough and fever Additional History (Context): Ron Ames is a 66 y.o. female with PMHx of DM and chronic smoking presents constant, tight SOB which began this morning. Pt reports it \"feels like throat is closed up\". Also reports she has been seeing a pulmonologist. Claims the pulmonologist \"found a lump close to heart\". Has been taking aspirin and cholesterol medications. Denies hx of HTN. Did not take any medications prior to sx. Also denies cough and fever. No modifying factors reported. No other concerns or symptoms at this time. PCP: Harman Wynne MD 
 
Current Outpatient Medications Medication Sig Dispense Refill  oxyCODONE IR 5 mg tab 10 mg, acetaminophen 325 mg tab 325 mg Take  by mouth every four (4) hours as needed.  atorvastatin (LIPITOR) 40 mg tablet Take 1 Tab by mouth daily. 30 Tab 6  
 ketorolac (TORADOL) 10 mg tablet Take 1 Tab by mouth every eight (8) hours as needed for Pain. 14 Tab 0  
 ondansetron (ZOFRAN ODT) 4 mg disintegrating tablet Take 1-2 tablets every 6-8 hours as needed for nausea and vomiting. 10 Tab 0  
 omeprazole (PRILOSEC) 40 mg capsule Take 40 mg by mouth daily.  polyethylene glycol (MIRALAX) 17 gram/dose powder Take 17 g by mouth daily. 1 tablespoon with 8 oz of water daily 289 g 0  
 losartan (COZAAR) 25 mg tablet TK 1 T PO D  3  
 aspirin delayed-release 81 mg tablet Take  by mouth daily.  FOLIC ACID/MULTIVIT-MIN/LUTEIN (CENTRUM SILVER PO) Take  by mouth daily.  carisoprodol (SOMA) 350 mg tablet Take 350 mg by mouth every eight (8) hours as needed.  amitriptyline (ELAVIL) 25 mg tablet Take 50 mg by mouth nightly as needed.  OTHER DDS Lumbar Traction Belt Use as directed 1 Device prn  metFORMIN (GLUCOPHAGE) 1,000 mg tablet Take 1,000 mg by mouth two (2) times a day.  glipiZIDE (GLUCOTROL) 10 mg tablet Take 10 mg by mouth two (2) times a day. Past History Past Medical History: 
Past Medical History:  
Diagnosis Date  Cataract  Chronic pain   
 lower back pain  Diabetes ([facility] [address]  Insomnia  Musculoskeletal disorder   
 herniated disk; bilat feet; left thumb;   
 Neuropathy Past Surgical History: 
Past Surgical History:  
Procedure Laterality Date  HX CATARACT REMOVAL Left 2016  HX  SECTION    
 HX CHOLECYSTECTOMY  HX HYSTERECTOMY  HX ORTHOPAEDIC  20+  
 left thumb  HX ROTATOR CUFF REPAIR Left  TEMPORAL ARTERY LIGATN OR BX Bilateral 2016 Dr. Eileen Quinonez Family History: 
Family History Problem Relation Age of Onset  Diabetes Mother  Hypertension Mother  Diabetes Father  Hypertension Father  Diabetes Brother Social History: 
Social History Tobacco Use  Smoking status: Current Every Day Smoker Packs/day: 0.50  Smokeless tobacco: Never Used Substance Use Topics  Alcohol use: No  
  Alcohol/week: 0.0 oz  Drug use: No  
 
 
Allergies: Allergies Allergen Reactions  Pcn [Penicillins] Rash Review of Systems Review of Systems Constitutional: Negative for activity change, fatigue and fever. HENT: Negative for congestion and rhinorrhea. Eyes: Negative for visual disturbance. Respiratory: Positive for shortness of breath. Negative for cough. Cardiovascular: Negative for chest pain and palpitations. Gastrointestinal: Negative for abdominal pain, diarrhea, nausea and vomiting. Genitourinary: Negative for dysuria and hematuria. Musculoskeletal: Negative for back pain. Skin: Negative for rash. Neurological: Negative for dizziness, weakness and light-headedness. All other systems reviewed and are negative. Physical Exam  
 
Visit Vitals /90 Pulse 66 Temp 98.8 °F (37.1 °C) Resp 17 Ht 5' 7\" (1.702 m) Wt 103.9 kg (229 lb) SpO2 99% BMI 35.87 kg/m² Well developed, well-nourished Normocephalic, atraumatic OP clear. No swelling. No lip swelling. Moist mucuous membranes PERRLA, EOM intact Neck supple and normal ROM Regular rate and rhythm Lungs clear to auscultation b/l Abdomen soft, nontender, nondistended, no guarding or rebound Moving all extremities Alert, Oriented x 3 Warm, dry Mood wnl Vital signs reviewed. Nursing notes reviewed. Diagnostic Study Results Labs - Recent Results (from the past 12 hour(s)) EKG, 12 LEAD, INITIAL Collection Time: 11/05/18  9:16 AM  
Result Value Ref Range Ventricular Rate 75 BPM  
 Atrial Rate 75 BPM  
 P-R Interval 162 ms QRS Duration 94 ms Q-T Interval 384 ms QTC Calculation (Bezet) 428 ms Calculated P Axis 61 degrees Calculated R Axis 49 degrees Calculated T Axis 39 degrees Diagnosis Normal sinus rhythm Anterior infarct , age undetermined Abnormal ECG When compared with ECG of 20-AUG-2016 10:58, No significant change was found Confirmed by Leonid Celis ([id number]) on 11/5/2018 10:52:04 AM 
  
CBC WITH AUTOMATED DIFF Collection Time: 11/05/18  9:55 AM  
Result Value Ref Range WBC 8.9 4.6 - 13.2 K/uL  
 RBC 4.49 4.20 - 5.30 M/uL  
 HGB 12.6 12.0 - 16.0 g/dL HCT 38.7 35.0 - 45.0 % MCV 86.2 74.0 - 97.0 FL  
 MCH 28.1 24.0 - 34.0 PG  
 MCHC 32.6 31.0 - 37.0 g/dL  
 RDW 14.3 11.6 - 14.5 % PLATELET 340 635 - 331 K/uL MPV 10.5 9.2 - 11.8 FL  
 NEUTROPHILS 64 40 - 73 % LYMPHOCYTES 33 21 - 52 % MONOCYTES 3 3 - 10 % EOSINOPHILS 0 0 - 5 % BASOPHILS 0 0 - 2 %  
 ABS. NEUTROPHILS 5.6 1.8 - 8.0 K/UL  
 ABS. LYMPHOCYTES 3.0 0.9 - 3.6 K/UL  
 ABS. MONOCYTES 0.3 0.05 - 1.2 K/UL  
 ABS. EOSINOPHILS 0.0 0.0 - 0.4 K/UL  
 ABS. BASOPHILS 0.0 0.0 - 0.1 K/UL  
 DF AUTOMATED METABOLIC PANEL, COMPREHENSIVE Collection Time: 11/05/18  9:55 AM  
Result Value Ref Range Sodium 140 136 - 145 mmol/L Potassium 4.0 3.5 - 5.5 mmol/L Chloride 106 100 - 108 mmol/L  
 CO2 25 21 - 32 mmol/L Anion gap 9 3.0 - 18 mmol/L Glucose 231 (H) 74 - 99 mg/dL BUN 11 7.0 - 18 MG/DL Creatinine 0.92 0.6 - 1.3 MG/DL  
 BUN/Creatinine ratio 12 12 - 20 GFR est AA >60 >60 ml/min/1.73m2 GFR est non-AA >60 >60 ml/min/1.73m2 Calcium 9.4 8.5 - 10.1 MG/DL Bilirubin, total 0.2 0.2 - 1.0 MG/DL  
 ALT (SGPT) 23 13 - 56 U/L  
 AST (SGOT) 15 15 - 37 U/L Alk. phosphatase 191 (H) 45 - 117 U/L Protein, total 8.3 (H) 6.4 - 8.2 g/dL Albumin 3.5 3.4 - 5.0 g/dL Globulin 4.8 (H) 2.0 - 4.0 g/dL A-G Ratio 0.7 (L) 0.8 - 1.7 NT-PRO BNP Collection Time: 11/05/18  9:55 AM  
Result Value Ref Range NT pro-BNP 30 0 - 900 PG/ML  
CARDIAC PANEL,(CK, CKMB & TROPONIN) Collection Time: 11/05/18  9:55 AM  
Result Value Ref Range  26 - 192 U/L  
 CK - MB <1.0 <3.6 ng/ml CK-MB Index  0.0 - 4.0 % CALCULATION NOT PERFORMED WHEN RESULT IS BELOW LINEAR LIMIT Troponin-I, Qt. <0.02 0.0 - 0.045 NG/ML  
URINALYSIS W/ RFLX MICROSCOPIC Collection Time: 11/05/18 10:40 AM  
Result Value Ref Range Color YELLOW Appearance CLEAR Specific gravity 1.027 1.005 - 1.030    
 pH (UA) 5.0 5.0 - 8.0 Protein TRACE (A) NEG mg/dL Glucose NEGATIVE  NEG mg/dL Ketone NEGATIVE  NEG mg/dL Bilirubin NEGATIVE  NEG Blood NEGATIVE  NEG Urobilinogen 1.0 0.2 - 1.0 EU/dL Nitrites NEGATIVE  NEG Leukocyte Esterase NEGATIVE  NEG    
URINE MICROSCOPIC ONLY Collection Time: 11/05/18 10:40 AM  
Result Value Ref Range WBC 0 to 2 0 - 4 /hpf  
 RBC NEGATIVE  0 - 5 /hpf Epithelial cells 2+ 0 - 5 /lpf Bacteria FEW (A) NEG /hpf Mucus 2+ (A) NEG /lpf  
TROPONIN I Collection Time: 11/05/18 11:18 AM  
Result Value Ref Range Troponin-I, Qt. <0.02 0.0 - 0.045 NG/ML Radiologic Studies -  
XR CHEST PA LAT Final Result Medical Decision Making I am the first provider for this patient. I reviewed the vital signs, available nursing notes, past medical history, past surgical history, family history and social history. Vital Signs-Reviewed the patient's vital signs. Pulse Oximetry Analysis -  99% on room air Stable. Cardiac Monitor: 
Rate: 74 Rhythm:  Normal Sinus Rhythm EKG: Interpreted by the EP. No acute findings Records Reviewed: Nursing Notes and Old Medical Records (Time of Review: 10:31 AM) ED Course: Progress Notes, Reevaluation, and Consults: 
10:58am 
NAD Patient updated on results Provider Notes (Medical Decision Making):  
Patient presents with difficulty breathing  And chest pain Is was whiledrinking something cold prior to arrival 
No chest pain No trouble breathing EKG, chest x-ray, blood work within normal limits troponin negative ×2 Normal physical exam 
Patient with possible allergic reaction that resolved prior to emergency department arrival 
[facility]. Follow-up PMD 
Return for further concerns Understands and agrees with plan Stable for discharge Diagnosis Clinical Impression: 1. Acute upper respiratory infection Disposition: Discharged to home in stable condition Follow-up Information Follow up With Specialties Details Why Contact Info Russell Whiting MD General Practice   [address] [address] [phone] Call in 2 days Medication List  
  
ASK your doctor about these medications   
amitriptyline 25 mg tablet Commonly known as:  ELAVIL 
  
 aspirin delayed-release 81 mg tablet 
  
atorvastatin 40 mg tablet Commonly known as:  LIPITOR Take 1 Tab by mouth daily. CENTRUM SILVER PO 
  
glipiZIDE 10 mg tablet Commonly known as:  GLUCOTROL 
  
ketorolac 10 mg tablet Commonly known as:  TORADOL Take 1 Tab by mouth every eight (8) hours as needed for Pain. 
  
losartan 25 mg tablet Commonly known as:  COZAAR 
  
metFORMIN 1,000 mg tablet Commonly known as:  GLUCOPHAGE 
  
omeprazole 40 mg capsule Commonly known as:  PRILOSEC 
  
ondansetron 4 mg disintegrating tablet Commonly known as:  ZOFRAN ODT Take 1-2 tablets every 6-8 hours as needed for nausea and vomiting. OTHER 
DDS Lumbar Traction Belt Use as directed 
  
oxyCODONE IR 5 mg tab 10 mg, acetaminophen 325 mg tab 325 mg 
  
polyethylene glycol 17 gram/dose powder Commonly known as:  Cinderella Rigoberto Take 17 g by mouth daily. 1 tablespoon with 8 oz of water daily SOMA 350 mg tablet Generic drug:  carisoprodol 
  
  
 
_______________________________ Attestations: 
Scribe Attestation Archana Palma acting as a scribe for and in the presence of Monica Hickman DO November 05, 2018 at 10:31 AM 
    
Provider Attestation:     
I personally performed the services described in the documentation, reviewed the documentation, as recorded by the scribe in my presence, and it accurately and completely records my words and actions. November 05, 2018 at 10:31 AM - Monica ULLOA DO   
____________________________ 
___

## 2018-11-05 NOTE — DISCHARGE INSTRUCTIONS

## 2018-11-05 NOTE — LETTER
[facility] 
[facility] EMERGENCY DEPT 
[address] [facility] [address] 
[phone] Work/School Note Date: 11/5/2018 To Whom It May concern: Boston Bauer was seen and treated today in the emergency room by the following provider(s): 
Attending Provider: Evan Muñiz Return to work on 11/6/18 Sincerely, 
 
 
 
 
Sonal Wilson RN

## 2019-03-27 ENCOUNTER — HOSPITAL ENCOUNTER (OUTPATIENT)
Dept: LAB | Age: 64
Discharge: HOME | End: 2019-03-27
Payer: COMMERCIAL

## 2019-03-27 PROCEDURE — 87338 HPYLORI STOOL AG IA: CPT

## 2019-03-29 LAB
H PYLORI AG STL QL IA: NEGATIVE
SPECIMEN SOURCE: NORMAL

## 2019-09-01 ENCOUNTER — APPOINTMENT (OUTPATIENT)
Dept: CT IMAGING | Age: 64
End: 2019-09-01
Attending: EMERGENCY MEDICINE
Payer: COMMERCIAL

## 2019-09-01 ENCOUNTER — HOSPITAL ENCOUNTER (EMERGENCY)
Age: 64
Discharge: HOME | End: 2019-09-01
Attending: EMERGENCY MEDICINE
Payer: COMMERCIAL

## 2019-09-01 VITALS
DIASTOLIC BLOOD PRESSURE: 61 MMHG | TEMPERATURE: 97.6 F | SYSTOLIC BLOOD PRESSURE: 137 MMHG | HEART RATE: 61 BPM | RESPIRATION RATE: 20 BRPM | OXYGEN SATURATION: 98 % | WEIGHT: 229 LBS | BODY MASS INDEX: 35.87 KG/M2

## 2019-09-01 DIAGNOSIS — R42 VERTIGO: Primary | ICD-10-CM

## 2019-09-01 LAB
ALBUMIN SERPL-MCNC: 3.5 G/DL (ref 3.4–5)
ALBUMIN/GLOB SERPL: 0.8 {RATIO} (ref 0.8–1.7)
ALP SERPL-CCNC: 244 U/L (ref 45–117)
ALT SERPL-CCNC: 24 U/L (ref 13–56)
ANION GAP SERPL CALC-SCNC: 13 MMOL/L (ref 3–18)
AST SERPL-CCNC: 14 U/L (ref 10–38)
ATRIAL RATE: 72 BPM
BASOPHILS # BLD: 0 K/UL (ref 0–0.1)
BASOPHILS NFR BLD: 0 % (ref 0–2)
BILIRUB SERPL-MCNC: 0.3 MG/DL (ref 0.2–1)
BUN SERPL-MCNC: 13 MG/DL (ref 7–18)
BUN/CREAT SERPL: 19 (ref 12–20)
CALCIUM SERPL-MCNC: 9.6 MG/DL (ref 8.5–10.1)
CALCULATED P AXIS, ECG09: 52 DEGREES
CALCULATED R AXIS, ECG10: 71 DEGREES
CALCULATED T AXIS, ECG11: 46 DEGREES
CHLORIDE SERPL-SCNC: 103 MMOL/L (ref 100–111)
CO2 SERPL-SCNC: 26 MMOL/L (ref 21–32)
CREAT SERPL-MCNC: 0.7 MG/DL (ref 0.6–1.3)
DIAGNOSIS, 93000: NORMAL
DIFFERENTIAL METHOD BLD: NORMAL
EOSINOPHIL # BLD: 0 K/UL (ref 0–0.4)
EOSINOPHIL NFR BLD: 1 % (ref 0–5)
ERYTHROCYTE [DISTWIDTH] IN BLOOD BY AUTOMATED COUNT: 14.3 % (ref 11.6–14.5)
GLOBULIN SER CALC-MCNC: 4.2 G/DL (ref 2–4)
GLUCOSE SERPL-MCNC: 180 MG/DL (ref 74–99)
HCT VFR BLD AUTO: 39.4 % (ref 35–45)
HGB BLD-MCNC: 12.6 G/DL (ref 12–16)
LYMPHOCYTES # BLD: 3.4 K/UL (ref 0.9–3.6)
LYMPHOCYTES NFR BLD: 39 % (ref 21–52)
MCH RBC QN AUTO: 28.4 PG (ref 24–34)
MCHC RBC AUTO-ENTMCNC: 32 G/DL (ref 31–37)
MCV RBC AUTO: 88.7 FL (ref 74–97)
MONOCYTES # BLD: 0.5 K/UL (ref 0.05–1.2)
MONOCYTES NFR BLD: 6 % (ref 3–10)
NEUTS SEG # BLD: 4.8 K/UL (ref 1.8–8)
NEUTS SEG NFR BLD: 54 % (ref 40–73)
P-R INTERVAL, ECG05: 152 MS
PLATELET # BLD AUTO: 245 K/UL (ref 135–420)
PMV BLD AUTO: 11.3 FL (ref 9.2–11.8)
POTASSIUM SERPL-SCNC: 4 MMOL/L (ref 3.5–5.5)
PROT SERPL-MCNC: 7.7 G/DL (ref 6.4–8.2)
Q-T INTERVAL, ECG07: 400 MS
QRS DURATION, ECG06: 94 MS
QTC CALCULATION (BEZET), ECG08: 438 MS
RBC # BLD AUTO: 4.44 M/UL (ref 4.2–5.3)
SODIUM SERPL-SCNC: 142 MMOL/L (ref 136–145)
VENTRICULAR RATE, ECG03: 72 BPM
WBC # BLD AUTO: 8.8 K/UL (ref 4.6–13.2)

## 2019-09-01 PROCEDURE — 85025 COMPLETE CBC W/AUTO DIFF WBC: CPT

## 2019-09-01 PROCEDURE — 93005 ELECTROCARDIOGRAM TRACING: CPT

## 2019-09-01 PROCEDURE — 74011250636 HC RX REV CODE- 250/636: Performed by: EMERGENCY MEDICINE

## 2019-09-01 PROCEDURE — 70450 CT HEAD/BRAIN W/O DYE: CPT

## 2019-09-01 PROCEDURE — 99285 EMERGENCY DEPT VISIT HI MDM: CPT

## 2019-09-01 PROCEDURE — 80053 COMPREHEN METABOLIC PANEL: CPT

## 2019-09-01 PROCEDURE — 96374 THER/PROPH/DIAG INJ IV PUSH: CPT

## 2019-09-01 RX ORDER — CYCLOBENZAPRINE HCL 10 MG
10 TABLET ORAL
COMMUNITY

## 2019-09-01 RX ORDER — ONDANSETRON 2 MG/ML
4 INJECTION INTRAMUSCULAR; INTRAVENOUS
Status: COMPLETED | OUTPATIENT
Start: 2019-09-01 | End: 2019-09-01

## 2019-09-01 RX ORDER — MECLIZINE HCL 12.5 MG 12.5 MG/1
25 TABLET ORAL
Status: COMPLETED | OUTPATIENT
Start: 2019-09-01 | End: 2019-09-01

## 2019-09-01 RX ORDER — ACETAMINOPHEN AND CODEINE PHOSPHATE 300; 30 MG/1; MG/1
1 TABLET ORAL
Status: DISCONTINUED | OUTPATIENT
Start: 2019-09-01 | End: 2019-09-01

## 2019-09-01 RX ORDER — ACETAMINOPHEN AND CODEINE PHOSPHATE 300; 30 MG/1; MG/1
1 TABLET ORAL
Qty: 12 TAB | Refills: 0 | Status: SHIPPED | OUTPATIENT
Start: 2019-09-01 | End: 2019-09-08

## 2019-09-01 RX ORDER — ONDANSETRON 4 MG/1
4 TABLET, FILM COATED ORAL
Qty: 8 TAB | Refills: 0 | Status: SHIPPED | OUTPATIENT
Start: 2019-09-01 | End: 2022-10-12 | Stop reason: ALTCHOICE

## 2019-09-01 RX ORDER — MECLIZINE HYDROCHLORIDE 25 MG/1
TABLET ORAL
Qty: 20 TAB | Refills: 0 | Status: SHIPPED | OUTPATIENT
Start: 2019-09-01 | End: 2022-10-12 | Stop reason: ALTCHOICE

## 2019-09-01 RX ADMIN — MECLIZINE 25 MG: 12.5 TABLET ORAL at 03:50

## 2019-09-01 RX ADMIN — ONDANSETRON 4 MG: 2 INJECTION INTRAMUSCULAR; INTRAVENOUS at 03:52

## 2019-09-01 NOTE — ED NOTES
Tele neurology consult complete. Pt remains awake/alert/oriented; affect calm/conversant, respirations regular/non labored/skin warm and dry. No distress noted. Plan of care explained/understood; pt reports dizziness/nausea resolved but still has inner ear pain. Instructed to ensure follow up with Dr Arabella Quevedo next business day as well as her PCP for further evaluation.

## 2019-09-01 NOTE — ED NOTES
Pt discharged to home. Instructed to follow up with her PCP and referred ENT as directed, and to return immediately for worsening pain/confusion/hemiparesis/confusion/facial droop/incontinence/fever/other concerns. Pt voiced her understanding.

## 2019-09-01 NOTE — DISCHARGE INSTRUCTIONS
Patient Education        Vertigo: Care Instructions  Your Care Instructions    Vertigo is the feeling that you or your surroundings are moving when there is no actual movement. It is often described as a feeling of spinning, whirling, falling, or tilting. Vertigo may make you vomit or feel nauseated. You may have trouble standing or walking and may lose your balance. Vertigo is often related to an inner ear problem, but it can have other more serious causes. If vertigo continues, you may need more tests to find its cause. Follow-up care is a key part of your treatment and safety. Be sure to make and go to all appointments, and call your doctor if you are having problems. It's also a good idea to know your test results and keep a list of the medicines you take. How can you care for yourself at home? · Do not lie flat on your back. Prop yourself up slightly. This may reduce the spinning feeling. Keep your eyes open. · Move slowly so that you do not fall. · If your doctor recommends medicine, take it exactly as directed. · Do not drive while you are having vertigo. Certain exercises, called Floyd-Daroff exercises, can help decrease vertigo. To do Floyd-Daroff exercises:  · Sit on the edge of a bed or sofa and quickly lie down on the side that causes the worst vertigo. Lie on your side with your ear down. · Stay in this position for at least 30 seconds or until the vertigo goes away. · Sit up. If this causes vertigo, wait for it to stop. · Repeat the procedure on the other side. · Repeat this 10 times. Do these exercises 2 times a day until the vertigo is gone. When should you call for help? Call 911 anytime you think you may need emergency care. For example, call if:    · You passed out (lost consciousness).     · You have symptoms of a stroke. These may include:  ? Sudden numbness, tingling, weakness, or loss of movement in your face, arm, or leg, especially on only one side of your body.   ? Sudden vision changes. ? Sudden trouble speaking. ? Sudden confusion or trouble understanding simple statements. ? Sudden problems with walking or balance. ? A sudden, severe headache that is different from past headaches.    Call your doctor now or seek immediate medical care if:    · Vertigo occurs with a fever, a headache, or ringing in your ears.     · You have new or increased nausea and vomiting.    Watch closely for changes in your health, and be sure to contact your doctor if:    · Vertigo gets worse or happens more often.     · Vertigo has not gotten better after 2 weeks. Where can you learn more? Go to http://diane-almas.info/. Enter V493 in the search box to learn more about \"Vertigo: Care Instructions. \"  Current as of: October 21, 2018  Content Version: 12.1  © 0395-2909 [employer]. Care instructions adapted under license by [employer] (which disclaims liability or warranty for this information). If you have questions about a medical condition or this instruction, always ask your healthcare professional. [place] [address] any warranty or liability for your use of this information. Patient Education        Dizziness: Care Instructions  Your Care Instructions  Dizziness is the feeling of unsteadiness or fuzziness in your head. It is different than having vertigo, which is a feeling that the room is spinning or that you are moving or falling. It is also different from lightheadedness, which is the feeling that you are about to faint. It can be hard to know what causes dizziness. Some people feel dizzy when they have migraine headaches. Sometimes bouts of flu can make you feel dizzy. Some medical conditions, such as heart problems or high blood pressure, can make you feel dizzy. Many medicines can cause dizziness, including medicines for high blood pressure, pain, or anxiety.   If a medicine causes your symptoms, your doctor may recommend that you stop or change the medicine. If it is a problem with your heart, you may need medicine to help your heart work better. If there is no clear reason for your symptoms, your doctor may suggest watching and waiting for a while to see if the dizziness goes away on its own. Follow-up care is a key part of your treatment and safety. Be sure to make and go to all appointments, and call your doctor if you are having problems. It's also a good idea to know your test results and keep a list of the medicines you take. How can you care for yourself at home? · If your doctor recommends or prescribes medicine, take it exactly as directed. Call your doctor if you think you are having a problem with your medicine. · Do not drive while you feel dizzy. · Try to prevent falls. Steps you can take include:  ? Using nonskid mats, adding grab bars near the tub, and using night-lights. ? Clearing your home so that walkways are free of anything you might trip on.  ? Letting family and friends know that you have been feeling dizzy. This will help them know how to help you. When should you call for help? Call 911 anytime you think you may need emergency care. For example, call if:    · You passed out (lost consciousness).     · You have dizziness along with symptoms of a heart attack. These may include:  ? Chest pain or pressure, or a strange feeling in the chest.  ? Sweating. ? Shortness of breath. ? Nausea or vomiting. ? Pain, pressure, or a strange feeling in the back, neck, jaw, or upper belly or in one or both shoulders or arms. ? Lightheadedness or sudden weakness. ? A fast or irregular heartbeat.     · You have symptoms of a stroke. These may include:  ? Sudden numbness, tingling, weakness, or loss of movement in your face, arm, or leg, especially on only one side of your body. ? Sudden vision changes. ? Sudden trouble speaking. ? Sudden confusion or trouble understanding simple statements.   ? Sudden problems with walking or balance. ? A sudden, severe headache that is different from past headaches.    Call your doctor now or seek immediate medical care if:    · You feel dizzy and have a fever, headache, or ringing in your ears.     · You have new or increased nausea and vomiting.     · Your dizziness does not go away or comes back.    Watch closely for changes in your health, and be sure to contact your doctor if:    · You do not get better as expected. Where can you learn more? Go to http://diane-almas.info/. Enter O118 in the search box to learn more about \"Dizziness: Care Instructions. \"  Current as of: September 23, 2018  Content Version: 12.1  © 0332-9014 [employer]. Care instructions adapted under license by [employer] (which disclaims liability or warranty for this information). If you have questions about a medical condition or this instruction, always ask your healthcare professional. [address] any warranty or liability for your use of this information.

## 2019-09-01 NOTE — ED PROVIDER NOTES
HPI patient is a 60-year-old female who presents to the ER with complaint of having dizziness and right ear pain that started 3 days ago. Patient states on the onset of her similar symptoms 3 days ago  she stuck a Q-tip in the right ear and her  symptoms got better.   However the symptoms got worse this am.    Past Medical History:   Diagnosis Date    Cataract     Chronic pain     lower back pain    Diabetes ([address]     Insomnia     Musculoskeletal disorder     herniated disk; bilat feet; left thumb;     Neuropathy        Past Surgical History:   Procedure Laterality Date    COLONOSCOPY N/A 3/13/2017    COLONOSCOPY polypectomy performed by Angelo Trinidad [facility]    HX CATARACT REMOVAL Left 2016    HX  SECTION      HX CHOLECYSTECTOMY      HX HYSTERECTOMY      HX ORTHOPAEDIC  20+    left thumb    HX ORTHOPAEDIC      left hand index/middle/ringer finger    HX ROTATOR CUFF REPAIR Left     TEMPORAL ARTERY LIGATN OR BX Bilateral 2016    Dr. Candida Vaughn         Family History:   Problem Relation Age of Onset    Diabetes Mother     Hypertension Mother     Diabetes Father     Hypertension Father     Diabetes Brother        Social History     Socioeconomic History    Marital status:      Spouse name: Not on file    Number of children: Not on file    Years of education: Not on file    Highest education level: Not on file   Occupational History    Not on file   Social Needs    Financial resource strain: Not on file    Food insecurity:     Worry: Not on file     Inability: Not on file    Transportation needs:     Medical: Not on file     Non-medical: Not on file   Tobacco Use    Smoking status: Current Every Day Smoker     Packs/day: 0.50    Smokeless tobacco: Never Used   Substance and Sexual Activity    Alcohol use: No     Alcohol/week: 0.0 standard drinks    Drug use: No    Sexual activity: Not Currently   Lifestyle    Physical activity:     Days per week: Not on file     Minutes per session: Not on file    Stress: Not on file   Relationships    Social connections:     Talks on phone: Not on file     Gets together: Not on file     Attends [employer] service: Not on file     Active member of club or organization: Not on file     Attends meetings of clubs or organizations: Not on file     Relationship status: Not on file    Intimate partner violence:     Fear of current or ex partner: Not on file     Emotionally abused: Not on file     Physically abused: Not on file     Forced sexual activity: Not on file   Other Topics Concern    Not on file   Social History Narrative    Not on file         ALLERGIES: Pcn [penicillins]    Review of Systems   Constitutional: Negative. HENT: Negative. Eyes: Negative. Respiratory: Negative. Cardiovascular: Negative. Gastrointestinal: Negative. Endocrine: Negative. Genitourinary: Negative. Musculoskeletal: Negative. Skin: Negative. Allergic/Immunologic: Negative. Neurological: Negative. Hematological: Negative. Psychiatric/Behavioral: Negative. All other systems reviewed and are negative. There were no vitals filed for this visit. Physical Exam   Constitutional: She is oriented to person, place, and time. She appears well-developed and well-nourished. No distress. HENT:   Head: Normocephalic. Right Ear: External ear normal.   Left Ear: External ear normal.   Mouth/Throat: No oropharyngeal exudate. Eyes: Pupils are equal, round, and reactive to light. Conjunctivae and EOM are normal. Right eye exhibits no discharge. Left eye exhibits no discharge. No scleral icterus. Neck: Normal range of motion. Neck supple. No JVD present. No tracheal deviation present. No thyromegaly present. Cardiovascular: Normal rate, regular rhythm, normal heart sounds and intact distal pulses. Exam reveals no gallop and no friction rub. No murmur heard.   Pulmonary/Chest: Effort normal and breath sounds normal. No stridor. No respiratory distress. She has no wheezes. She has no rales. She exhibits no tenderness. Abdominal: Soft. Bowel sounds are normal. She exhibits no distension and no mass. There is no tenderness. There is no rebound and no guarding. Musculoskeletal: Normal range of motion. She exhibits no edema or tenderness. Lymphadenopathy:     She has no cervical adenopathy. Neurological: She is alert and oriented to person, place, and time. She displays normal reflexes. No cranial nerve deficit. She exhibits normal muscle tone. Coordination normal.   Skin: Skin is warm and dry. No rash noted. She is not diaphoretic. No erythema. No pallor. Nursing note and vitals reviewed. MDM: Differential diagnosis: Labyrinthitis, otitis media, TIA, acoustical neuroma, ceruminosis     Patient had HINTS test done by me. It appears patient symptoms is peripheral vs central.  CT scan was ordered. Hospital course: Consulted teleneurologist (Dr. Mirian Nolan). He states the patient symptoms are peripheral as opposed to being central with no signs of TIA. He recommended patient to be discharge on antivert. Dx: Peripheral vertigo    Disp: Differential diagnosis: Perforated tympanic membrane, labyrinthitis, vertigo, TIA, hypoglycemia    Disp: Peripheral vertigo    Disposition: Discharge home    Dictation disclaimer:  Please note that this dictation was completed with [employer], the [employer] voice recognition software. Quite often unanticipated grammatical, syntax, homophones, and other interpretive errors are inadvertently transcribed by the computer software. Please disregard these errors. Please excuse any errors that have escaped final proofreading.

## 2020-03-12 ENCOUNTER — HOSPITAL ENCOUNTER (OUTPATIENT)
Age: 65
Discharge: HOME | End: 2020-03-12
Attending: SPECIALIST
Payer: COMMERCIAL

## 2020-03-12 ENCOUNTER — HOSPITAL ENCOUNTER (OUTPATIENT)
Dept: MAMMOGRAPHY | Age: 65
Discharge: HOME | End: 2020-03-12
Attending: NURSE PRACTITIONER
Payer: COMMERCIAL

## 2020-03-12 DIAGNOSIS — M54.50 LUMBAR SPINE PAIN: ICD-10-CM

## 2020-03-12 DIAGNOSIS — Z12.31 VISIT FOR SCREENING MAMMOGRAM: ICD-10-CM

## 2020-03-12 PROCEDURE — 77063 BREAST TOMOSYNTHESIS BI: CPT

## 2020-03-12 PROCEDURE — 72148 MRI LUMBAR SPINE W/O DYE: CPT

## 2020-06-08 ENCOUNTER — APPOINTMENT (OUTPATIENT)
Dept: GENERAL RADIOLOGY | Age: 65
End: 2020-06-08
Attending: EMERGENCY MEDICINE
Payer: COMMERCIAL

## 2020-06-08 ENCOUNTER — APPOINTMENT (OUTPATIENT)
Dept: VASCULAR SURGERY | Age: 65
End: 2020-06-08
Attending: EMERGENCY MEDICINE
Payer: COMMERCIAL

## 2020-06-08 ENCOUNTER — APPOINTMENT (OUTPATIENT)
Dept: CT IMAGING | Age: 65
End: 2020-06-08
Attending: EMERGENCY MEDICINE
Payer: COMMERCIAL

## 2020-06-08 ENCOUNTER — HOSPITAL ENCOUNTER (EMERGENCY)
Age: 65
Discharge: HOME | End: 2020-06-08
Attending: EMERGENCY MEDICINE
Payer: COMMERCIAL

## 2020-06-08 VITALS
SYSTOLIC BLOOD PRESSURE: 107 MMHG | OXYGEN SATURATION: 100 % | TEMPERATURE: 98.1 F | HEART RATE: 65 BPM | HEIGHT: 66 IN | BODY MASS INDEX: 36.64 KG/M2 | WEIGHT: 228 LBS | DIASTOLIC BLOOD PRESSURE: 90 MMHG | RESPIRATION RATE: 16 BRPM

## 2020-06-08 DIAGNOSIS — M25.562 ARTHRALGIA OF LEFT LOWER LEG: Primary | ICD-10-CM

## 2020-06-08 LAB
ALBUMIN SERPL-MCNC: 3.6 G/DL (ref 3.4–5)
ALBUMIN/GLOB SERPL: 0.9 {RATIO} (ref 0.8–1.7)
ALP SERPL-CCNC: 177 U/L (ref 45–117)
ALT SERPL-CCNC: 19 U/L (ref 13–56)
ANION GAP SERPL CALC-SCNC: 5 MMOL/L (ref 3–18)
AST SERPL-CCNC: 8 U/L (ref 10–38)
ATRIAL RATE: 80 BPM
BASOPHILS # BLD: 0 K/UL (ref 0–0.1)
BASOPHILS NFR BLD: 0 % (ref 0–2)
BILIRUB SERPL-MCNC: 0.3 MG/DL (ref 0.2–1)
BNP SERPL-MCNC: 24 PG/ML (ref 0–900)
BUN SERPL-MCNC: 11 MG/DL (ref 7–18)
BUN/CREAT SERPL: 17 (ref 12–20)
CALCIUM SERPL-MCNC: 9.4 MG/DL (ref 8.5–10.1)
CALCULATED P AXIS, ECG09: 53 DEGREES
CALCULATED R AXIS, ECG10: 73 DEGREES
CALCULATED T AXIS, ECG11: 49 DEGREES
CHLORIDE SERPL-SCNC: 107 MMOL/L (ref 100–111)
CK MB CFR SERPL CALC: NORMAL % (ref 0–4)
CK MB SERPL-MCNC: <1 NG/ML (ref 5–25)
CK SERPL-CCNC: 87 U/L (ref 26–192)
CO2 SERPL-SCNC: 26 MMOL/L (ref 21–32)
CREAT SERPL-MCNC: 0.63 MG/DL (ref 0.6–1.3)
D DIMER PPP FEU-MCNC: 0.62 UG/ML(FEU)
DIAGNOSIS, 93000: NORMAL
DIFFERENTIAL METHOD BLD: ABNORMAL
EOSINOPHIL # BLD: 0 K/UL (ref 0–0.4)
EOSINOPHIL NFR BLD: 0 % (ref 0–5)
ERYTHROCYTE [DISTWIDTH] IN BLOOD BY AUTOMATED COUNT: 14.7 % (ref 11.6–14.5)
GLOBULIN SER CALC-MCNC: 4.2 G/DL (ref 2–4)
GLUCOSE SERPL-MCNC: 115 MG/DL (ref 74–99)
HCT VFR BLD AUTO: 38.4 % (ref 35–45)
HGB BLD-MCNC: 12.5 G/DL (ref 12–16)
LYMPHOCYTES # BLD: 3.7 K/UL (ref 0.9–3.6)
LYMPHOCYTES NFR BLD: 40 % (ref 21–52)
MAGNESIUM SERPL-MCNC: 1.7 MG/DL (ref 1.6–2.6)
MCH RBC QN AUTO: 28.3 PG (ref 24–34)
MCHC RBC AUTO-ENTMCNC: 32.6 G/DL (ref 31–37)
MCV RBC AUTO: 87.1 FL (ref 74–97)
MONOCYTES # BLD: 0.6 K/UL (ref 0.05–1.2)
MONOCYTES NFR BLD: 6 % (ref 3–10)
NEUTS SEG # BLD: 4.9 K/UL (ref 1.8–8)
NEUTS SEG NFR BLD: 54 % (ref 40–73)
P-R INTERVAL, ECG05: 152 MS
PLATELET # BLD AUTO: 267 K/UL (ref 135–420)
PMV BLD AUTO: 11.7 FL (ref 9.2–11.8)
POTASSIUM SERPL-SCNC: 3.9 MMOL/L (ref 3.5–5.5)
PROT SERPL-MCNC: 7.8 G/DL (ref 6.4–8.2)
Q-T INTERVAL, ECG07: 384 MS
QRS DURATION, ECG06: 94 MS
QTC CALCULATION (BEZET), ECG08: 442 MS
RBC # BLD AUTO: 4.41 M/UL (ref 4.2–5.3)
SODIUM SERPL-SCNC: 138 MMOL/L (ref 136–145)
TROPONIN I SERPL-MCNC: <0.02 NG/ML (ref 0–0.04)
VENTRICULAR RATE, ECG03: 80 BPM
WBC # BLD AUTO: 9.2 K/UL (ref 4.6–13.2)

## 2020-06-08 PROCEDURE — 74011636320 HC RX REV CODE- 636/320: Performed by: EMERGENCY MEDICINE

## 2020-06-08 PROCEDURE — 93971 EXTREMITY STUDY: CPT

## 2020-06-08 PROCEDURE — 71275 CT ANGIOGRAPHY CHEST: CPT

## 2020-06-08 PROCEDURE — 83735 ASSAY OF MAGNESIUM: CPT

## 2020-06-08 PROCEDURE — 85379 FIBRIN DEGRADATION QUANT: CPT

## 2020-06-08 PROCEDURE — 96374 THER/PROPH/DIAG INJ IV PUSH: CPT

## 2020-06-08 PROCEDURE — 96375 TX/PRO/DX INJ NEW DRUG ADDON: CPT

## 2020-06-08 PROCEDURE — 99284 EMERGENCY DEPT VISIT MOD MDM: CPT

## 2020-06-08 PROCEDURE — 85025 COMPLETE CBC W/AUTO DIFF WBC: CPT

## 2020-06-08 PROCEDURE — 80053 COMPREHEN METABOLIC PANEL: CPT

## 2020-06-08 PROCEDURE — 83880 ASSAY OF NATRIURETIC PEPTIDE: CPT

## 2020-06-08 PROCEDURE — 93005 ELECTROCARDIOGRAM TRACING: CPT

## 2020-06-08 PROCEDURE — 82550 ASSAY OF CK (CPK): CPT

## 2020-06-08 PROCEDURE — 74011250636 HC RX REV CODE- 250/636: Performed by: EMERGENCY MEDICINE

## 2020-06-08 PROCEDURE — 71045 X-RAY EXAM CHEST 1 VIEW: CPT

## 2020-06-08 RX ORDER — KETOROLAC TROMETHAMINE 15 MG/ML
15 INJECTION, SOLUTION INTRAMUSCULAR; INTRAVENOUS
Status: COMPLETED | OUTPATIENT
Start: 2020-06-08 | End: 2020-06-08

## 2020-06-08 RX ORDER — SITAGLIPTIN AND METFORMIN HYDROCHLORIDE 50; 1000 MG/1; MG/1
1 TABLET, FILM COATED ORAL 2 TIMES DAILY WITH MEALS
COMMUNITY

## 2020-06-08 RX ORDER — METHOCARBAMOL 750 MG/1
750 TABLET, FILM COATED ORAL
Qty: 3 TAB | Refills: 0 | Status: SHIPPED | OUTPATIENT
Start: 2020-06-08 | End: 2020-06-11

## 2020-06-08 RX ORDER — MORPHINE SULFATE 2 MG/ML
4 INJECTION, SOLUTION INTRAMUSCULAR; INTRAVENOUS
Status: COMPLETED | OUTPATIENT
Start: 2020-06-08 | End: 2020-06-08

## 2020-06-08 RX ADMIN — IOPAMIDOL 80 ML: 755 INJECTION, SOLUTION INTRAVENOUS at 13:21

## 2020-06-08 RX ADMIN — MORPHINE SULFATE 4 MG: 2 INJECTION, SOLUTION INTRAMUSCULAR; INTRAVENOUS at 09:39

## 2020-06-08 RX ADMIN — KETOROLAC TROMETHAMINE 15 MG: 15 INJECTION, SOLUTION INTRAMUSCULAR; INTRAVENOUS at 13:30

## 2020-06-08 NOTE — ED TRIAGE NOTES
Patient c/o chest pain in the center and vibration on the right feels like when your phone vibrate. Denies injury to the leg.

## 2020-06-08 NOTE — LETTER
NOTIFICATION RETURN TO WORK / SCHOOL 
 
6/8/2020 3:26 PM 
 
Ms. Homar Wilkerson [address] [facility] [address] [facility] [address] [facility] To Whom It May Concern: Homar Wilkerson is currently under the care of [address] [facility] EMERGENCY DEPT. She will return to work/school on: 6/10/2020 Homar Wilkerson may return to work/school with the following restrictions: As tolerated If there are questions or concerns please have the patient contact our office. Sincerely, Tiffanie Tolbert MD

## 2020-06-08 NOTE — ED PROVIDER NOTES
EMERGENCY DEPARTMENT HISTORY AND PHYSICAL EXAM    9:01 AM  Date: 2020  Patient Name: Lavon Jennings    History of Presenting Illness     Chief Complaint   Patient presents with    Leg Pain    Leg Swelling    Chest Pain        History Provided By: patient     HPI: Lavon Jennings is a 63 y.o. female with past medical history of diabetes, hypercholesterolemia, previous DVT presents with left leg pain and swelling for a week. Denies any injury of the left leg. She also experienced some feeling of vibration in the right chest yesterday and some dull pain that has now resolved. Patient states that her pain was intermittent; 3-4/10 in severity, she feels the pain on the right and in the center of her chest, no alleviating or exacerbating factors. No difficulty breathing.        PCP: Karyn Abreu MD    Past History     Past Medical History:  Past Medical History:   Diagnosis Date    Cataract     Chronic pain     lower back pain    Diabetes ([facility] [address]     Ill-defined condition     dvt    Insomnia     Musculoskeletal disorder     herniated disk; bilat feet; left thumb;     Neuropathy        Past Surgical History:  Past Surgical History:   Procedure Laterality Date    COLONOSCOPY N/A 3/13/2017    COLONOSCOPY polypectomy performed by Travis Mcgovern MD at [facility]    HX CATARACT REMOVAL Left 2016    HX  SECTION      HX CHOLECYSTECTOMY      HX HYSTERECTOMY      HX ORTHOPAEDIC  20+    left thumb    HX ORTHOPAEDIC      left hand index/middle/ringer finger    HX ROTATOR CUFF REPAIR Left     TEMPORAL ARTERY LIGATN OR BX Bilateral 2016    Dr. Aracelis Jiménez       Family History:  Family History   Problem Relation Age of Onset    Diabetes Mother     Hypertension Mother     Diabetes Father     Hypertension Father     Diabetes Brother        Social History:  Social History     Tobacco Use    Smoking status: Current Every Day Smoker     Packs/day: 0.50    Smokeless tobacco: Never Used   Substance Use Topics    Alcohol use: No     Alcohol/week: 0.0 standard drinks    Drug use: No       Allergies: Allergies   Allergen Reactions    Pcn [Penicillins] Rash       Review of Systems   Review of Systems   Constitutional: Negative for activity change, appetite change and chills. HENT: Negative for congestion, ear discharge, ear pain and sore throat. Eyes: Negative for photophobia and pain. Respiratory: Negative for cough and choking. Cardiovascular: Positive for chest pain and leg swelling. Negative for palpitations. Gastrointestinal: Negative for anal bleeding and rectal pain. Endocrine: Negative for polydipsia and polyuria. Genitourinary: Negative for genital sores and urgency. Musculoskeletal: Negative for arthralgias and myalgias. Neurological: Negative for dizziness, seizures and speech difficulty. Psychiatric/Behavioral: Negative for hallucinations, self-injury and suicidal ideas. Physical Exam     Patient Vitals for the past 12 hrs:   Temp Pulse Resp BP SpO2   06/08/20 1236  65 16  100 %   06/08/20 1230   14 107/90    06/08/20 1200  67 15 126/75 100 %   06/08/20 1130  71 20 141/89 100 %   06/08/20 1100  71 13 128/61 100 %   06/08/20 0856 98.1 °F (36.7 °C) 76 22 137/67 100 %       Physical Exam  Vitals signs and nursing note reviewed. Constitutional:       Appearance: She is well-developed. HENT:      Head: Normocephalic and atraumatic. Eyes:      General:         Right eye: No discharge. Left eye: No discharge. Neck:      Musculoskeletal: Normal range of motion and neck supple. Cardiovascular:      Rate and Rhythm: Normal rate and regular rhythm. Heart sounds: Normal heart sounds. No murmur. Pulmonary:      Effort: Pulmonary effort is normal. No respiratory distress. Breath sounds: Normal breath sounds. No stridor. No wheezing or rales. Chest:      Chest wall: No tenderness.    Abdominal:      General: Bowel sounds are normal. There is no distension. Palpations: Abdomen is soft. Tenderness: There is no abdominal tenderness. There is no guarding or rebound. Musculoskeletal: Normal range of motion. Comments: Left leg pain and swelling on the back of her calf  neurovascular status intact   Skin:     General: Skin is warm and dry. Neurological:      Mental Status: She is alert and oriented to person, place, and time. Diagnostic Study Results     Labs -  Recent Results (from the past 12 hour(s))   EKG, 12 LEAD, INITIAL    Collection Time: 06/08/20  8:50 AM   Result Value Ref Range    Ventricular Rate 80 BPM    Atrial Rate 80 BPM    P-R Interval 152 ms    QRS Duration 94 ms    Q-T Interval 384 ms    QTC Calculation (Bezet) 442 ms    Calculated P Axis 53 degrees    Calculated R Axis 73 degrees    Calculated T Axis 49 degrees    Diagnosis       Normal sinus rhythm  Cannot rule out Anterior infarct , age undetermined  Abnormal ECG  When compared with ECG of 01-SEP-2019 03:47,  No significant change was found  Confirmed by Jhonny Escobedo MD, --- ([id number]) on 6/8/2020 3:12:21 PM     CBC WITH AUTOMATED DIFF    Collection Time: 06/08/20  9:05 AM   Result Value Ref Range    WBC 9.2 4.6 - 13.2 K/uL    RBC 4.41 4.20 - 5.30 M/uL    HGB 12.5 12.0 - 16.0 g/dL    HCT 38.4 35.0 - 45.0 %    MCV 87.1 74.0 - 97.0 FL    MCH 28.3 24.0 - 34.0 PG    MCHC 32.6 31.0 - 37.0 g/dL    RDW 14.7 (H) 11.6 - 14.5 %    PLATELET 632 049 - 742 K/uL    MPV 11.7 9.2 - 11.8 FL    NEUTROPHILS 54 40 - 73 %    LYMPHOCYTES 40 21 - 52 %    MONOCYTES 6 3 - 10 %    EOSINOPHILS 0 0 - 5 %    BASOPHILS 0 0 - 2 %    ABS. NEUTROPHILS 4.9 1.8 - 8.0 K/UL    ABS. LYMPHOCYTES 3.7 (H) 0.9 - 3.6 K/UL    ABS. MONOCYTES 0.6 0.05 - 1.2 K/UL    ABS. EOSINOPHILS 0.0 0.0 - 0.4 K/UL    ABS.  BASOPHILS 0.0 0.0 - 0.1 K/UL    DF AUTOMATED     METABOLIC PANEL, COMPREHENSIVE    Collection Time: 06/08/20  9:05 AM   Result Value Ref Range    Sodium 138 136 - 145 mmol/L    Potassium 3.9 3.5 - 5.5 mmol/L    Chloride 107 100 - 111 mmol/L    CO2 26 21 - 32 mmol/L    Anion gap 5 3.0 - 18 mmol/L    Glucose 115 (H) 74 - 99 mg/dL    BUN 11 7.0 - 18 MG/DL    Creatinine 0.63 0.6 - 1.3 MG/DL    BUN/Creatinine ratio 17 12 - 20      GFR est AA >60 >60 ml/min/1.73m2    GFR est non-AA >60 >60 ml/min/1.73m2    Calcium 9.4 8.5 - 10.1 MG/DL    Bilirubin, total 0.3 0.2 - 1.0 MG/DL    ALT (SGPT) 19 13 - 56 U/L    AST (SGOT) 8 (L) 10 - 38 U/L    Alk. phosphatase 177 (H) 45 - 117 U/L    Protein, total 7.8 6.4 - 8.2 g/dL    Albumin 3.6 3.4 - 5.0 g/dL    Globulin 4.2 (H) 2.0 - 4.0 g/dL    A-G Ratio 0.9 0.8 - 1.7     CARDIAC PANEL,(CK, CKMB & TROPONIN)    Collection Time: 06/08/20  9:05 AM   Result Value Ref Range    CK - MB <1.0 <3.6 ng/ml    CK-MB Index  0.0 - 4.0 %     CALCULATION NOT PERFORMED WHEN RESULT IS BELOW LINEAR LIMIT    CK 87 26 - 192 U/L    Troponin-I, QT <0.02 0.0 - 0.045 NG/ML   MAGNESIUM    Collection Time: 06/08/20  9:05 AM   Result Value Ref Range    Magnesium 1.7 1.6 - 2.6 mg/dL   NT-PRO BNP    Collection Time: 06/08/20  9:05 AM   Result Value Ref Range    NT pro-BNP 24 0 - 900 PG/ML   D DIMER    Collection Time: 06/08/20  9:05 AM   Result Value Ref Range    D DIMER 0.62 (H) <0.46 ug/ml(FEU)       Radiologic Studies -   Cta Chest W Or W Wo Cont    Result Date: 6/8/2020  IMPRESSION: 1. No convincing CT evidence of pulmonary embolism. 2.  No acute pulmonary or chest finding. 3. Stable and low attenuation ovoid nodule at medial right lower lobe, compatible with benign entities such as a hamartoma. Thank you for your referral.     Xr Chest Port    Result Date: 6/8/2020  IMPRESSION: 1. No evidence of acute cardiopulmonary disease. Medical Decision Making     ED Course: Progress Notes, Reevaluation, and Consults:    9:01 AM Initial assessment performed. The patients presenting problems have been discussed, and they/their family are in agreement with the care plan formulated and outlined with them. I have encouraged them to ask questions as they arise throughout their visit. Provider Notes (Medical Decision Making): We will do cardiac work-up, x-ray, DVT study and reassess  Patient was not compliant with DVT study so she was given pain medicine  No evidence of DVT  Labs reviewed   No evidence of cardiopulmonary disease on xray  Troponin negative episode of chest pain, more than 12 hrs ago  Patient had a positive D- dimer, CTA performed no evidence of PE. Patient had positive  Patient feels better on reasessement and will be discharged with PMD f/u/      Vital Signs-Reviewed the patient's vital signs. Reviewed pt's pulse ox reading. EKG: Interpreted by the EP. Time Interpreted: 9:06   Rate: 80   Rhythm: NSR   Interpretation:no ST elevations      Records Reviewed:  old medical records (Time of Review: 9:01 AM)  -I am the first provider for this patient.  -I reviewed the vital signs, available nursing notes, past medical history, past surgical history, family history and social history. Current Outpatient Medications   Medication Sig Dispense Refill    SITagliptin-metFORMIN (Janumet) 50-1,000 mg per tablet Take 1 Tab by mouth two (2) times daily (with meals).  methocarbamoL (ROBAXIN) 750 mg tablet Take 1 Tab by mouth nightly as needed for Muscle Spasm(s) for up to 3 days. 3 Tab 0    cyclobenzaprine (FLEXERIL) 10 mg tablet Take 10 mg by mouth three (3) times daily as needed for Muscle Spasm(s).  oxyCODONE IR 5 mg tab 10 mg, acetaminophen 325 mg tab 325 mg Take  by mouth every four (4) hours as needed.  atorvastatin (LIPITOR) 40 mg tablet Take 1 Tab by mouth daily. 30 Tab 6    omeprazole (PRILOSEC) 40 mg capsule Take 40 mg by mouth daily.  polyethylene glycol (MIRALAX) 17 gram/dose powder Take 17 g by mouth daily. 1 tablespoon with 8 oz of water daily 289 g 0    losartan (COZAAR) 25 mg tablet TK 1 T PO D  3    aspirin delayed-release 81 mg tablet Take  by mouth daily.       FOLIC ACID/MULTIVIT-MIN/LUTEIN (CENTRUM SILVER PO) Take  by mouth daily.  glipiZIDE (GLUCOTROL) 10 mg tablet Take 10 mg by mouth two (2) times a day.  meclizine (ANTIVERT) 25 mg tablet Take 1 tablet by mouth every 6 hours for the next 3 days. Then stop taking the meclizine. Restart the meclizine for 3 day intervals if vertigo/ dizziness returns. 20 Tab 0    ondansetron hcl (ZOFRAN) 4 mg tablet Take 1 Tab by mouth every eight (8) hours as needed for Nausea. 8 Tab 0    OTHER DDS Lumbar Traction Belt  Use as directed 1 Device prn        Clinical Impression     Clinical Impression:   1. Arthralgia of left lower leg        Disposition: discharge      DISCHARGE NOTE:   Pt has been reexamined. Patient has no new complaints, changes, or physical findings. Care plan outlined and precautions discussed. Results were reviewed with the patient. All medications were reviewed with the patient; will d/c home with PMD f/u. All of pt's questions and concerns were addressed. Patient was instructed and agrees to follow up with PMD, as well as to return to the ED upon further deterioration. Patient is ready to go home. This note was dictated utilizing voice recognition software which may lead to typographical errors. I apologize in advance if the situation occurs. If questions arise please do not hesitate to contact me or call our department. This note was dictated utilizing voice recognition software which may lead to typographical errors. I apologize in advance if the situation occurs. If questions arise please do not hesitate to contact me or call our department.     Mike Claros MD  9:01 AM

## 2020-06-08 NOTE — ED NOTES
Pt remains in NAD. Pt transported to radiology department in [facility], in stable condition.

## 2020-06-08 NOTE — LETTER
[facility] EMERGENCY DEPT 
[id number] [id number] [phone] Work/School Note Date: 6/8/2020 To Whom It May concern: Era Zavala was seen and treated today in the emergency room by the following provider(s): 
Attending Provider: Jeanice Goodell, MD. Era Zavala may return to work on 6/10/20 Sincerely, Lulu Hartman MD

## 2020-06-08 NOTE — DISCHARGE INSTRUCTIONS

## 2020-12-16 ENCOUNTER — HOSPITAL ENCOUNTER (OUTPATIENT)
Dept: LAB | Age: 65
Discharge: HOME | End: 2020-12-16
Payer: COMMERCIAL

## 2020-12-16 LAB
ERYTHROCYTE [DISTWIDTH] IN BLOOD BY AUTOMATED COUNT: 14.6 % (ref 11.6–14.5)
HBA1C MFR BLD: 9.1 % (ref 4.2–5.6)
HCT VFR BLD AUTO: 39.7 % (ref 35–45)
HGB BLD-MCNC: 13.1 G/DL (ref 12–16)
MCH RBC QN AUTO: 28.9 PG (ref 24–34)
MCHC RBC AUTO-ENTMCNC: 33 G/DL (ref 31–37)
MCV RBC AUTO: 87.6 FL (ref 74–97)
PLATELET # BLD AUTO: 316 K/UL (ref 135–420)
PMV BLD AUTO: 11.7 FL (ref 9.2–11.8)
RBC # BLD AUTO: 4.53 M/UL (ref 4.2–5.3)
WBC # BLD AUTO: 9.5 K/UL (ref 4.6–13.2)

## 2020-12-16 PROCEDURE — 83036 HEMOGLOBIN GLYCOSYLATED A1C: CPT

## 2020-12-16 PROCEDURE — 85027 COMPLETE CBC AUTOMATED: CPT

## 2020-12-16 PROCEDURE — 36415 COLL VENOUS BLD VENIPUNCTURE: CPT

## 2021-02-10 ENCOUNTER — TRANSCRIBE ORDER (OUTPATIENT)
Dept: SCHEDULING | Age: 66
End: 2021-02-10

## 2021-02-10 DIAGNOSIS — R29.898 WEAKNESS OF BOTH HANDS: Primary | ICD-10-CM

## 2021-02-23 ENCOUNTER — TRANSCRIBE ORDER (OUTPATIENT)
Dept: SCHEDULING | Age: 66
End: 2021-02-23

## 2021-02-23 DIAGNOSIS — M54.10 RADICULOPATHY: ICD-10-CM

## 2021-02-23 DIAGNOSIS — R26.89 IMBALANCE: ICD-10-CM

## 2021-02-23 DIAGNOSIS — G62.9 NEUROPATHY: ICD-10-CM

## 2021-02-23 DIAGNOSIS — R51.9 HEADACHE: Primary | ICD-10-CM

## 2021-03-03 ENCOUNTER — HOSPITAL ENCOUNTER (OUTPATIENT)
Age: 66
Discharge: HOME | End: 2021-03-03
Attending: NURSE PRACTITIONER
Payer: COMMERCIAL

## 2021-03-03 DIAGNOSIS — R29.898 WEAKNESS OF BOTH HANDS: ICD-10-CM

## 2021-03-03 PROCEDURE — 72141 MRI NECK SPINE W/O DYE: CPT

## 2021-03-04 ENCOUNTER — TRANSCRIBE ORDER (OUTPATIENT)
Dept: SCHEDULING | Age: 66
End: 2021-03-04

## 2021-03-04 DIAGNOSIS — R29.898 WEAKNESS OF BOTH HANDS: Primary | ICD-10-CM

## 2021-03-16 ENCOUNTER — HOSPITAL ENCOUNTER (OUTPATIENT)
Dept: LAB | Age: 66
Discharge: HOME | End: 2021-03-16
Payer: COMMERCIAL

## 2021-03-16 LAB
BASOPHILS # BLD: 0 K/UL (ref 0–0.1)
BASOPHILS NFR BLD: 0 % (ref 0–2)
CRP SERPL-MCNC: 1.3 MG/DL (ref 0–0.3)
DIFFERENTIAL METHOD BLD: ABNORMAL
EOSINOPHIL # BLD: 0.1 K/UL (ref 0–0.4)
EOSINOPHIL NFR BLD: 1 % (ref 0–5)
ERYTHROCYTE [DISTWIDTH] IN BLOOD BY AUTOMATED COUNT: 14.3 % (ref 11.6–14.5)
ERYTHROCYTE [SEDIMENTATION RATE] IN BLOOD: 56 MM/HR (ref 0–30)
HCT VFR BLD AUTO: 39.1 % (ref 35–45)
HGB BLD-MCNC: 13.1 G/DL (ref 12–16)
LYMPHOCYTES # BLD: 4.2 K/UL (ref 0.9–3.6)
LYMPHOCYTES NFR BLD: 41 % (ref 21–52)
MCH RBC QN AUTO: 28.7 PG (ref 24–34)
MCHC RBC AUTO-ENTMCNC: 33.5 G/DL (ref 31–37)
MCV RBC AUTO: 85.7 FL (ref 74–97)
MONOCYTES # BLD: 0.6 K/UL (ref 0.05–1.2)
MONOCYTES NFR BLD: 6 % (ref 3–10)
NEUTS SEG # BLD: 5.3 K/UL (ref 1.8–8)
NEUTS SEG NFR BLD: 52 % (ref 40–73)
PLATELET # BLD AUTO: 299 K/UL (ref 135–420)
PMV BLD AUTO: 11.5 FL (ref 9.2–11.8)
RBC # BLD AUTO: 4.56 M/UL (ref 4.2–5.3)
WBC # BLD AUTO: 10.1 K/UL (ref 4.6–13.2)

## 2021-03-16 PROCEDURE — 36415 COLL VENOUS BLD VENIPUNCTURE: CPT

## 2021-03-16 PROCEDURE — 85652 RBC SED RATE AUTOMATED: CPT

## 2021-03-16 PROCEDURE — 86140 C-REACTIVE PROTEIN: CPT

## 2021-03-16 PROCEDURE — 85025 COMPLETE CBC W/AUTO DIFF WBC: CPT

## 2021-11-17 ENCOUNTER — HOSPITAL ENCOUNTER (OUTPATIENT)
Dept: LAB | Age: 66
Discharge: HOME | End: 2021-11-17
Payer: COMMERCIAL

## 2021-11-17 LAB
25(OH)D3 SERPL-MCNC: 33.6 NG/ML (ref 30–100)
ALBUMIN SERPL-MCNC: 3.2 G/DL (ref 3.4–5)
ALBUMIN/GLOB SERPL: 0.8 {RATIO} (ref 0.8–1.7)
ALP SERPL-CCNC: 161 U/L (ref 45–117)
ALT SERPL-CCNC: 27 U/L (ref 13–56)
ANION GAP SERPL CALC-SCNC: 1 MMOL/L (ref 3–18)
AST SERPL-CCNC: 13 U/L (ref 10–38)
BASOPHILS # BLD: 0 K/UL (ref 0–0.1)
BASOPHILS NFR BLD: 0 % (ref 0–2)
BILIRUB SERPL-MCNC: 0.4 MG/DL (ref 0.2–1)
BUN SERPL-MCNC: 11 MG/DL (ref 7–18)
BUN/CREAT SERPL: 16 (ref 12–20)
CALCIUM SERPL-MCNC: 9.6 MG/DL (ref 8.5–10.1)
CHLORIDE SERPL-SCNC: 111 MMOL/L (ref 100–111)
CHOLEST SERPL-MCNC: 234 MG/DL
CO2 SERPL-SCNC: 30 MMOL/L (ref 21–32)
CREAT SERPL-MCNC: 0.67 MG/DL (ref 0.6–1.3)
CREAT UR-MCNC: 132 MG/DL (ref 30–125)
DIFFERENTIAL METHOD BLD: ABNORMAL
EOSINOPHIL # BLD: 0 K/UL (ref 0–0.4)
EOSINOPHIL NFR BLD: 0 % (ref 0–5)
ERYTHROCYTE [DISTWIDTH] IN BLOOD BY AUTOMATED COUNT: 14 % (ref 11.6–14.5)
GLOBULIN SER CALC-MCNC: 3.9 G/DL (ref 2–4)
GLUCOSE SERPL-MCNC: 139 MG/DL (ref 74–99)
HCT VFR BLD AUTO: 40.3 % (ref 35–45)
HDLC SERPL-MCNC: 54 MG/DL (ref 40–60)
HDLC SERPL: 4.3 {RATIO} (ref 0–5)
HGB BLD-MCNC: 12.4 G/DL (ref 12–16)
IMM GRANULOCYTES # BLD AUTO: 0 K/UL (ref 0–0.04)
IMM GRANULOCYTES NFR BLD AUTO: 0 % (ref 0–0.5)
LDLC SERPL CALC-MCNC: 160.2 MG/DL (ref 0–100)
LIPID PROFILE,FLP: ABNORMAL
LYMPHOCYTES # BLD: 2.9 K/UL (ref 0.9–3.6)
LYMPHOCYTES NFR BLD: 37 % (ref 21–52)
MCH RBC QN AUTO: 27.2 PG (ref 24–34)
MCHC RBC AUTO-ENTMCNC: 30.8 G/DL (ref 31–37)
MCV RBC AUTO: 88.4 FL (ref 78–100)
MICROALBUMIN UR-MCNC: 1.35 MG/DL (ref 0–3)
MICROALBUMIN/CREAT UR-RTO: 10 MG/G (ref 0–30)
MONOCYTES # BLD: 0.4 K/UL (ref 0.05–1.2)
MONOCYTES NFR BLD: 6 % (ref 3–10)
NEUTS SEG # BLD: 4.4 K/UL (ref 1.8–8)
NEUTS SEG NFR BLD: 56 % (ref 40–73)
NRBC # BLD: 0 K/UL (ref 0–0.01)
NRBC BLD-RTO: 0 PER 100 WBC
PLATELET # BLD AUTO: 242 K/UL (ref 135–420)
PMV BLD AUTO: 11.3 FL (ref 9.2–11.8)
POTASSIUM SERPL-SCNC: 4.5 MMOL/L (ref 3.5–5.5)
PROT SERPL-MCNC: 7.1 G/DL (ref 6.4–8.2)
RBC # BLD AUTO: 4.56 M/UL (ref 4.2–5.3)
SODIUM SERPL-SCNC: 142 MMOL/L (ref 136–145)
T4 FREE SERPL-MCNC: 1 NG/DL (ref 0.7–1.5)
TRIGL SERPL-MCNC: 99 MG/DL (ref ?–150)
TSH SERPL DL<=0.05 MIU/L-ACNC: 1.12 UIU/ML (ref 0.36–3.74)
VLDLC SERPL CALC-MCNC: 19.8 MG/DL
WBC # BLD AUTO: 7.9 K/UL (ref 4.6–13.2)

## 2021-11-17 PROCEDURE — 80053 COMPREHEN METABOLIC PANEL: CPT

## 2021-11-17 PROCEDURE — 80061 LIPID PANEL: CPT

## 2021-11-17 PROCEDURE — 36415 COLL VENOUS BLD VENIPUNCTURE: CPT

## 2021-11-17 PROCEDURE — 82306 VITAMIN D 25 HYDROXY: CPT

## 2021-11-17 PROCEDURE — 84439 ASSAY OF FREE THYROXINE: CPT

## 2021-11-17 PROCEDURE — 84443 ASSAY THYROID STIM HORMONE: CPT

## 2021-11-17 PROCEDURE — 82043 UR ALBUMIN QUANTITATIVE: CPT

## 2021-11-17 PROCEDURE — 85025 COMPLETE CBC W/AUTO DIFF WBC: CPT

## 2022-02-04 ENCOUNTER — HOSPITAL ENCOUNTER (OUTPATIENT)
Dept: VASCULAR SURGERY | Age: 67
Discharge: HOME | End: 2022-02-04
Attending: NURSE PRACTITIONER
Payer: MEDICARE

## 2022-02-04 ENCOUNTER — TRANSCRIBE ORDER (OUTPATIENT)
Dept: REGISTRATION | Age: 67
End: 2022-02-04

## 2022-02-04 ENCOUNTER — HOSPITAL ENCOUNTER (OUTPATIENT)
Dept: GENERAL RADIOLOGY | Age: 67
Discharge: HOME | End: 2022-02-04
Attending: NURSE PRACTITIONER
Payer: MEDICARE

## 2022-02-04 ENCOUNTER — TRANSCRIBE ORDER (OUTPATIENT)
Dept: SCHEDULING | Age: 67
End: 2022-02-04

## 2022-02-04 DIAGNOSIS — M25.522 LEFT ELBOW PAIN: ICD-10-CM

## 2022-02-04 DIAGNOSIS — M79.89 SWELLING OF LIMB: ICD-10-CM

## 2022-02-04 DIAGNOSIS — M25.522 LEFT ELBOW PAIN: Primary | ICD-10-CM

## 2022-02-04 DIAGNOSIS — M79.89 SWELLING OF LIMB: Primary | ICD-10-CM

## 2022-02-04 PROCEDURE — 73080 X-RAY EXAM OF ELBOW: CPT

## 2022-02-04 PROCEDURE — 93971 EXTREMITY STUDY: CPT

## 2022-02-07 ENCOUNTER — HOSPITAL ENCOUNTER (OUTPATIENT)
Dept: VASCULAR SURGERY | Age: 67
Discharge: HOME | End: 2022-02-07
Attending: NURSE PRACTITIONER

## 2022-02-07 DIAGNOSIS — M79.89 SWELLING OF LIMB: ICD-10-CM

## 2022-02-08 ENCOUNTER — TRANSCRIBE ORDER (OUTPATIENT)
Dept: SCHEDULING | Age: 67
End: 2022-02-08

## 2022-02-08 DIAGNOSIS — M79.89 SOFT TISSUE MASS: Primary | ICD-10-CM

## 2022-02-18 ENCOUNTER — HOSPITAL ENCOUNTER (OUTPATIENT)
Dept: MRI IMAGING | Age: 67
Discharge: HOME | End: 2022-02-18
Attending: NURSE PRACTITIONER
Payer: MEDICARE

## 2022-02-18 DIAGNOSIS — M79.89 SOFT TISSUE MASS: ICD-10-CM

## 2022-02-18 PROCEDURE — 74011250636 HC RX REV CODE- 250/636

## 2022-02-18 PROCEDURE — A9577 INJ MULTIHANCE: HCPCS

## 2022-02-18 PROCEDURE — 73223 MRI JOINT UPR EXTR W/O&W/DYE: CPT

## 2022-02-18 RX ADMIN — GADOBENATE DIMEGLUMINE 15 ML: 529 INJECTION, SOLUTION INTRAVENOUS at 20:39

## 2022-03-19 PROBLEM — E78.5 DYSLIPIDEMIA: Status: ACTIVE | Noted: 2018-10-18

## 2022-03-20 PROBLEM — E66.01 SEVERE OBESITY (HCC): Status: ACTIVE | Noted: 2018-10-18

## 2022-03-25 ENCOUNTER — HOSPITAL ENCOUNTER (EMERGENCY)
Age: 67
Discharge: HOME | End: 2022-03-25
Attending: EMERGENCY MEDICINE
Payer: MEDICARE

## 2022-03-25 ENCOUNTER — APPOINTMENT (OUTPATIENT)
Dept: GENERAL RADIOLOGY | Age: 67
End: 2022-03-25
Attending: EMERGENCY MEDICINE
Payer: MEDICARE

## 2022-03-25 VITALS
DIASTOLIC BLOOD PRESSURE: 58 MMHG | WEIGHT: 220 LBS | SYSTOLIC BLOOD PRESSURE: 120 MMHG | RESPIRATION RATE: 15 BRPM | HEIGHT: 66 IN | OXYGEN SATURATION: 100 % | BODY MASS INDEX: 35.36 KG/M2 | HEART RATE: 80 BPM | TEMPERATURE: 98.7 F

## 2022-03-25 DIAGNOSIS — T78.40XA ALLERGIC REACTION, INITIAL ENCOUNTER: Primary | ICD-10-CM

## 2022-03-25 LAB
ALBUMIN SERPL-MCNC: 3.5 G/DL (ref 3.4–5)
ALBUMIN/GLOB SERPL: 0.8 {RATIO} (ref 0.8–1.7)
ALP SERPL-CCNC: 147 U/L (ref 45–117)
ALT SERPL-CCNC: 34 U/L (ref 13–56)
ANION GAP SERPL CALC-SCNC: 4 MMOL/L (ref 3–18)
AST SERPL-CCNC: 16 U/L (ref 10–38)
ATRIAL RATE: 74 BPM
BASOPHILS # BLD: 0 K/UL (ref 0–0.1)
BASOPHILS NFR BLD: 0 % (ref 0–2)
BILIRUB SERPL-MCNC: 0.2 MG/DL (ref 0.2–1)
BNP SERPL-MCNC: 54 PG/ML (ref 0–900)
BUN SERPL-MCNC: 13 MG/DL (ref 7–18)
BUN/CREAT SERPL: 16 (ref 12–20)
CALCIUM SERPL-MCNC: 10 MG/DL (ref 8.5–10.1)
CALCULATED P AXIS, ECG09: 57 DEGREES
CALCULATED R AXIS, ECG10: 67 DEGREES
CALCULATED T AXIS, ECG11: 59 DEGREES
CHLORIDE SERPL-SCNC: 109 MMOL/L (ref 100–111)
CO2 SERPL-SCNC: 28 MMOL/L (ref 21–32)
CREAT SERPL-MCNC: 0.82 MG/DL (ref 0.6–1.3)
DIAGNOSIS, 93000: NORMAL
DIFFERENTIAL METHOD BLD: ABNORMAL
EOSINOPHIL # BLD: 0.1 K/UL (ref 0–0.4)
EOSINOPHIL NFR BLD: 1 % (ref 0–5)
ERYTHROCYTE [DISTWIDTH] IN BLOOD BY AUTOMATED COUNT: 14.3 % (ref 11.6–14.5)
GLOBULIN SER CALC-MCNC: 4.2 G/DL (ref 2–4)
GLUCOSE SERPL-MCNC: 110 MG/DL (ref 74–99)
HCT VFR BLD AUTO: 36.5 % (ref 35–45)
HGB BLD-MCNC: 11.7 G/DL (ref 12–16)
IMM GRANULOCYTES # BLD AUTO: 0 K/UL (ref 0–0.04)
IMM GRANULOCYTES NFR BLD AUTO: 0 % (ref 0–0.5)
LYMPHOCYTES # BLD: 3.6 K/UL (ref 0.9–3.6)
LYMPHOCYTES NFR BLD: 44 % (ref 21–52)
MCH RBC QN AUTO: 28.1 PG (ref 24–34)
MCHC RBC AUTO-ENTMCNC: 32.1 G/DL (ref 31–37)
MCV RBC AUTO: 87.5 FL (ref 78–100)
MONOCYTES # BLD: 0.5 K/UL (ref 0.05–1.2)
MONOCYTES NFR BLD: 6 % (ref 3–10)
NEUTS SEG # BLD: 4 K/UL (ref 1.8–8)
NEUTS SEG NFR BLD: 49 % (ref 40–73)
NRBC # BLD: 0 K/UL (ref 0–0.01)
NRBC BLD-RTO: 0 PER 100 WBC
P-R INTERVAL, ECG05: 166 MS
PLATELET # BLD AUTO: 269 K/UL (ref 135–420)
PMV BLD AUTO: 11.4 FL (ref 9.2–11.8)
POTASSIUM SERPL-SCNC: 3.6 MMOL/L (ref 3.5–5.5)
PROT SERPL-MCNC: 7.7 G/DL (ref 6.4–8.2)
Q-T INTERVAL, ECG07: 386 MS
QRS DURATION, ECG06: 96 MS
QTC CALCULATION (BEZET), ECG08: 428 MS
RBC # BLD AUTO: 4.17 M/UL (ref 4.2–5.3)
SODIUM SERPL-SCNC: 141 MMOL/L (ref 136–145)
TROPONIN-HIGH SENSITIVITY: 3 NG/L (ref 0–54)
VENTRICULAR RATE, ECG03: 74 BPM
WBC # BLD AUTO: 8.2 K/UL (ref 4.6–13.2)

## 2022-03-25 PROCEDURE — 96374 THER/PROPH/DIAG INJ IV PUSH: CPT

## 2022-03-25 PROCEDURE — 74011000250 HC RX REV CODE- 250: Performed by: PHYSICIAN ASSISTANT

## 2022-03-25 PROCEDURE — 94640 AIRWAY INHALATION TREATMENT: CPT

## 2022-03-25 PROCEDURE — 74011250636 HC RX REV CODE- 250/636: Performed by: EMERGENCY MEDICINE

## 2022-03-25 PROCEDURE — 80053 COMPREHEN METABOLIC PANEL: CPT

## 2022-03-25 PROCEDURE — 84484 ASSAY OF TROPONIN QUANT: CPT

## 2022-03-25 PROCEDURE — 96375 TX/PRO/DX INJ NEW DRUG ADDON: CPT

## 2022-03-25 PROCEDURE — 99285 EMERGENCY DEPT VISIT HI MDM: CPT

## 2022-03-25 PROCEDURE — 85025 COMPLETE CBC W/AUTO DIFF WBC: CPT

## 2022-03-25 PROCEDURE — 74011000250 HC RX REV CODE- 250: Performed by: EMERGENCY MEDICINE

## 2022-03-25 PROCEDURE — 74011250636 HC RX REV CODE- 250/636: Performed by: PHYSICIAN ASSISTANT

## 2022-03-25 PROCEDURE — 71045 X-RAY EXAM CHEST 1 VIEW: CPT

## 2022-03-25 PROCEDURE — 83880 ASSAY OF NATRIURETIC PEPTIDE: CPT

## 2022-03-25 PROCEDURE — 93005 ELECTROCARDIOGRAM TRACING: CPT

## 2022-03-25 RX ORDER — DEXAMETHASONE SODIUM PHOSPHATE 4 MG/ML
6 INJECTION, SOLUTION INTRA-ARTICULAR; INTRALESIONAL; INTRAMUSCULAR; INTRAVENOUS; SOFT TISSUE ONCE
Status: COMPLETED | OUTPATIENT
Start: 2022-03-25 | End: 2022-03-25

## 2022-03-25 RX ORDER — ALBUTEROL SULFATE 2.5 MG/.5ML
5 SOLUTION RESPIRATORY (INHALATION)
Status: COMPLETED | OUTPATIENT
Start: 2022-03-25 | End: 2022-03-25

## 2022-03-25 RX ORDER — DEXAMETHASONE SODIUM PHOSPHATE 4 MG/ML
10 INJECTION, SOLUTION INTRA-ARTICULAR; INTRALESIONAL; INTRAMUSCULAR; INTRAVENOUS; SOFT TISSUE
Status: DISCONTINUED | OUTPATIENT
Start: 2022-03-25 | End: 2022-03-25 | Stop reason: SDUPTHER

## 2022-03-25 RX ORDER — DIPHENHYDRAMINE HYDROCHLORIDE 50 MG/ML
25 INJECTION, SOLUTION INTRAMUSCULAR; INTRAVENOUS ONCE
Status: COMPLETED | OUTPATIENT
Start: 2022-03-25 | End: 2022-03-25

## 2022-03-25 RX ORDER — DIPHENHYDRAMINE HCL 50 MG
50 CAPSULE ORAL
Status: DISCONTINUED | OUTPATIENT
Start: 2022-03-25 | End: 2022-03-25 | Stop reason: SDUPTHER

## 2022-03-25 RX ADMIN — SODIUM CHLORIDE, PRESERVATIVE FREE 20 MG: 5 INJECTION INTRAVENOUS at 14:57

## 2022-03-25 RX ADMIN — DEXAMETHASONE SODIUM PHOSPHATE 6 MG: 4 INJECTION, SOLUTION INTRAMUSCULAR; INTRAVENOUS at 14:57

## 2022-03-25 RX ADMIN — ALBUTEROL SULFATE 5 MG: 2.5 SOLUTION RESPIRATORY (INHALATION) at 14:56

## 2022-03-25 RX ADMIN — DIPHENHYDRAMINE HYDROCHLORIDE 25 MG: 50 INJECTION, SOLUTION INTRAMUSCULAR; INTRAVENOUS at 14:56

## 2022-03-25 NOTE — DISCHARGE INSTRUCTIONS
For any alarming symptoms return to the emergency department.   Alarming symptoms include sensation of throat closing or severe difficulty breathing chest pain etc.

## 2022-03-25 NOTE — ED PROVIDER NOTES
EMERGENCY DEPARTMENT HISTORY AND PHYSICAL EXAM    2:31 PM      Date: 3/25/2022  Patient Name: Renetta Carter    History of Presenting Illness     Chief Complaint   Patient presents with    Allergic Reaction         History Provided By: patient    Additional History (Context): Renetta Carter is a 74 y.o. female presents with history of diabetes his 3 days of worsening shortness of breath with exertion heaviness in the upper chest and feeling like she cannot exhale all the way and also throat closing or clogging. She recently started gabapentin. No prior history of lung problems. No pain. Feels better with rest.  Symptoms are moderate at the time of my exam.    PCP: Abiel Cummings NP    Chief Complaint:   Duration:    Timing:    Location:   Quality:   Severity:   Modifying Factors:   Associated Symptoms:       Current Outpatient Medications   Medication Sig Dispense Refill    SITagliptin-metFORMIN (Janumet) 50-1,000 mg per tablet Take 1 Tab by mouth two (2) times daily (with meals).  cyclobenzaprine (FLEXERIL) 10 mg tablet Take 10 mg by mouth three (3) times daily as needed for Muscle Spasm(s).  meclizine (ANTIVERT) 25 mg tablet Take 1 tablet by mouth every 6 hours for the next 3 days. Then stop taking the meclizine. Restart the meclizine for 3 day intervals if vertigo/ dizziness returns. 20 Tab 0    ondansetron hcl (ZOFRAN) 4 mg tablet Take 1 Tab by mouth every eight (8) hours as needed for Nausea. 8 Tab 0    oxyCODONE IR 5 mg tab 10 mg, acetaminophen 325 mg tab 325 mg Take  by mouth every four (4) hours as needed.  atorvastatin (LIPITOR) 40 mg tablet Take 1 Tab by mouth daily. 30 Tab 6    omeprazole (PRILOSEC) 40 mg capsule Take 40 mg by mouth daily.  polyethylene glycol (MIRALAX) 17 gram/dose powder Take 17 g by mouth daily. 1 tablespoon with 8 oz of water daily 289 g 0    losartan (COZAAR) 25 mg tablet TK 1 T PO D  3    aspirin delayed-release 81 mg tablet Take  by mouth daily.  FOLIC ACID/MULTIVIT-MIN/LUTEIN (CENTRUM SILVER PO) Take  by mouth daily.  OTHER DDS Lumbar Traction Belt  Use as directed 1 Device prn    glipiZIDE (GLUCOTROL) 10 mg tablet Take 10 mg by mouth two (2) times a day. Past History     Past Medical History:  Past Medical History:   Diagnosis Date    Cataract     Chronic pain     lower back pain    Diabetes ([address]     Ill-defined condition     dvt    Insomnia     Musculoskeletal disorder     herniated disk; bilat feet; left thumb;     Neuropathy        Past Surgical History:  Past Surgical History:   Procedure Laterality Date    COLONOSCOPY N/A 3/13/2017    COLONOSCOPY polypectomy performed by Daya Almaraz MD at [facility] ENDOSCOPY    HX CATARACT REMOVAL Left 2016    HX  SECTION      HX CHOLECYSTECTOMY      HX HYSTERECTOMY      HX ORTHOPAEDIC  20+    left thumb    HX ORTHOPAEDIC      left hand index/middle/ringer finger    HX ROTATOR CUFF REPAIR Left     TEMPORAL ARTERY LIGATN OR BX Bilateral 2016    Dr. Iveth Lang       Family History:  Family History   Problem Relation Age of Onset    Diabetes Mother     Hypertension Mother     Diabetes Father     Hypertension Father     Diabetes Brother        Social History:  Social History     Tobacco Use    Smoking status: Current Every Day Smoker     Packs/day: 0.50    Smokeless tobacco: Never Used   Substance Use Topics    Alcohol use: No     Alcohol/week: 0.0 standard drinks    Drug use: No       Allergies: Allergies   Allergen Reactions    Pcn [Penicillins] Rash         Review of Systems     Review of Systems   Constitutional: Negative for diaphoresis and fever. HENT: Negative for congestion and sore throat. Eyes: Negative for pain and itching. Respiratory: Positive for choking and shortness of breath. Negative for cough. Cardiovascular: Negative for chest pain and palpitations. Gastrointestinal: Negative for abdominal pain and diarrhea.    Endocrine: Negative for polydipsia and polyuria. Genitourinary: Negative for dysuria and hematuria. Musculoskeletal: Negative for arthralgias and myalgias. Skin: Negative for rash and wound. Neurological: Negative for seizures and syncope. Hematological: Does not bruise/bleed easily. Psychiatric/Behavioral: Negative for agitation and hallucinations. Physical Exam       Patient Vitals for the past 12 hrs:   Temp Pulse Resp BP SpO2   03/25/22 1512  80 15 (!) 120/58 100 %   03/25/22 1457  75 18 112/62 100 %   03/25/22 1442  82 22 (!) 127/59 100 %   03/25/22 1427 98.7 °F (37.1 °C) 83 20 (!) 141/65 100 %       IPVITALS  Patient Vitals for the past 24 hrs:   BP Temp Pulse Resp SpO2 Height Weight   03/25/22 1519      5' 6\" (1.676 m) 99.8 kg (220 lb)   03/25/22 1512 (!) 120/58  80 15 100 %     03/25/22 1457 112/62  75 18 100 %     03/25/22 1442 (!) 127/59  82 22 100 %     03/25/22 1427 (!) 141/65 98.7 °F (37.1 °C) 83 20 100 %         Physical Exam  Vitals and nursing note reviewed. Constitutional:       General: She is in acute distress. Appearance: She is well-developed. She is obese. HENT:      Head: Normocephalic and atraumatic. Eyes:      General: No scleral icterus. Conjunctiva/sclera: Conjunctivae normal.   Neck:      Vascular: No JVD. Cardiovascular:      Rate and Rhythm: Normal rate and regular rhythm. Heart sounds: Normal heart sounds. Comments: 4 intact extremity pulses  Pulmonary:      Effort: Respiratory distress present. Breath sounds: Normal breath sounds. No stridor. No wheezing. Comments: No definitive stridor. Voice is hoarse and seems to be sticking. Abdominal:      Palpations: Abdomen is soft. There is no mass. Tenderness: There is no abdominal tenderness. Musculoskeletal:         General: Normal range of motion. Cervical back: Normal range of motion and neck supple. Lymphadenopathy:      Cervical: No cervical adenopathy.    Skin: General: Skin is warm and dry. Neurological:      Mental Status: She is alert. Diagnostic Study Results   Labs -  Recent Results (from the past 24 hour(s))   CBC WITH AUTOMATED DIFF    Collection Time: 03/25/22  3:03 PM   Result Value Ref Range    WBC 8.2 4.6 - 13.2 K/uL    RBC 4.17 (L) 4.20 - 5.30 M/uL    HGB 11.7 (L) 12.0 - 16.0 g/dL    HCT 36.5 35.0 - 45.0 %    MCV 87.5 78.0 - 100.0 FL    MCH 28.1 24.0 - 34.0 PG    MCHC 32.1 31.0 - 37.0 g/dL    RDW 14.3 11.6 - 14.5 %    PLATELET 299 007 - 688 K/uL    MPV 11.4 9.2 - 11.8 FL    NRBC 0.0 0  WBC    ABSOLUTE NRBC 0.00 0.00 - 0.01 K/uL    NEUTROPHILS 49 40 - 73 %    LYMPHOCYTES 44 21 - 52 %    MONOCYTES 6 3 - 10 %    EOSINOPHILS 1 0 - 5 %    BASOPHILS 0 0 - 2 %    IMMATURE GRANULOCYTES 0 0.0 - 0.5 %    ABS. NEUTROPHILS 4.0 1.8 - 8.0 K/UL    ABS. LYMPHOCYTES 3.6 0.9 - 3.6 K/UL    ABS. MONOCYTES 0.5 0.05 - 1.2 K/UL    ABS. EOSINOPHILS 0.1 0.0 - 0.4 K/UL    ABS. BASOPHILS 0.0 0.0 - 0.1 K/UL    ABS. IMM. GRANS. 0.0 0.00 - 0.04 K/UL    DF AUTOMATED     NT-PRO BNP    Collection Time: 03/25/22  3:03 PM   Result Value Ref Range    NT pro-BNP 54 0 - 900 PG/ML   TROPONIN-HIGH SENSITIVITY    Collection Time: 03/25/22  3:03 PM   Result Value Ref Range    Troponin-High Sensitivity 3 0 - 54 ng/L   METABOLIC PANEL, COMPREHENSIVE    Collection Time: 03/25/22  3:03 PM   Result Value Ref Range    Sodium 141 136 - 145 mmol/L    Potassium 3.6 3.5 - 5.5 mmol/L    Chloride 109 100 - 111 mmol/L    CO2 28 21 - 32 mmol/L    Anion gap 4 3.0 - 18 mmol/L    Glucose 110 (H) 74 - 99 mg/dL    BUN 13 7.0 - 18 MG/DL    Creatinine 0.82 0.6 - 1.3 MG/DL    BUN/Creatinine ratio 16 12 - 20      GFR est AA >60 >60 ml/min/1.73m2    GFR est non-AA >60 >60 ml/min/1.73m2    Calcium 10.0 8.5 - 10.1 MG/DL    Bilirubin, total 0.2 0.2 - 1.0 MG/DL    ALT (SGPT) 34 13 - 56 U/L    AST (SGOT) 16 10 - 38 U/L    Alk.  phosphatase 147 (H) 45 - 117 U/L    Protein, total 7.7 6.4 - 8.2 g/dL    Albumin 3.5 3.4 - 5.0 g/dL    Globulin 4.2 (H) 2.0 - 4.0 g/dL    A-G Ratio 0.8 0.8 - 1.7     EKG, 12 LEAD, INITIAL    Collection Time: 03/25/22  3:07 PM   Result Value Ref Range    Ventricular Rate 74 BPM    Atrial Rate 74 BPM    P-R Interval 166 ms    QRS Duration 96 ms    Q-T Interval 386 ms    QTC Calculation (Bezet) 428 ms    Calculated P Axis 57 degrees    Calculated R Axis 67 degrees    Calculated T Axis 59 degrees    Diagnosis       Normal sinus rhythm  Normal ECG  When compared with ECG of 08-JUN-2020 08:50,  No significant change was found  Confirmed by Rory Khanna MD, Oral Dux ([id number]) on 3/25/2022 3:23:31 PM         Radiologic Studies -   XR CHEST PORT   Final Result   No acute pulmonary disease. XR CHEST PORT    Result Date: 3/25/2022  Portable CXR: History shortness of breath. Comparison 6/8/2020 Cardiac silhouette and vascularity within normal limits. Lungs and costophrenic angles are clear. Low lung volumes from poor inspiration. No acute pulmonary disease. Medications ordered:   Medications   famotidine (PF) (PEPCID) 20 mg in 0.9% sodium chloride 10 mL injection (20 mg IntraVENous Given 3/25/22 1457)   dexamethasone (DECADRON) 4 mg/mL injection 6 mg (6 mg IntraVENous Given 3/25/22 1457)   diphenhydrAMINE (BENADRYL) injection 25 mg (25 mg IntraVENous Given 3/25/22 1456)   albuterol CONCENTRATE 2.5mg/0.5 mL neb soln (5 mg Nebulization Given 3/25/22 1456)         Medical Decision Making   Initial Medical Decision Making and DDx:  Possibly allergic reaction, also weather change, undiagnosed broncoconstrictive disease. Discussed with the charge nurse to have patient roomed.     ED Course: Progress Notes, Reevaluation, and Consults:  ED Course as of 03/25/22 1707   Fri Mar 25, 2022   1513 Twelve-lead EKG sinus rhythm at 74 no acute process [CB]      ED Course User Index  [CB] Jose Ceja MD     No alarming events during her stay in the ER, feeling much better and stable labs and ready for discharge    I am the first provider for this patient. I reviewed the vital signs, available nursing notes, past medical history, past surgical history, family history and social history. Patient Vitals for the past 12 hrs:   Temp Pulse Resp BP SpO2   03/25/22 1512  80 15 (!) 120/58 100 %   03/25/22 1457  75 18 112/62 100 %   03/25/22 1442  82 22 (!) 127/59 100 %   03/25/22 1427 98.7 °F (37.1 °C) 83 20 (!) 141/65 100 %       Vital Signs-Reviewed the patient's vital signs. Pulse Oximetry Analysis, Cardiac Monitor, 12 lead ekg:      Interpreted by the EP. Records Reviewed: Nursing notes reviewed (Time of Review: 2:31 PM)    Procedures:   Critical Care Time:   Aspirin: (was aspirin given for stroke?)    Diagnosis     Clinical Impression:   1. Allergic reaction, initial encounter        Disposition: Discharged      Follow-up Information     Follow up With Specialties Details Why Contact Info    Herber Andino, KIARRA Nurse Practitioner In 3 days  [address]  [phone]             Patient's Medications   Start Taking    No medications on file   Continue Taking    ASPIRIN DELAYED-RELEASE 81 MG TABLET    Take  by mouth daily. ATORVASTATIN (LIPITOR) 40 MG TABLET    Take 1 Tab by mouth daily. CYCLOBENZAPRINE (FLEXERIL) 10 MG TABLET    Take 10 mg by mouth three (3) times daily as needed for Muscle Spasm(s). FOLIC ACID/MULTIVIT-MIN/LUTEIN (CENTRUM SILVER PO)    Take  by mouth daily. GLIPIZIDE (GLUCOTROL) 10 MG TABLET    Take 10 mg by mouth two (2) times a day. LOSARTAN (COZAAR) 25 MG TABLET    TK 1 T PO D    MECLIZINE (ANTIVERT) 25 MG TABLET    Take 1 tablet by mouth every 6 hours for the next 3 days. Then stop taking the meclizine. Restart the meclizine for 3 day intervals if vertigo/ dizziness returns. OMEPRAZOLE (PRILOSEC) 40 MG CAPSULE    Take 40 mg by mouth daily.     ONDANSETRON HCL (ZOFRAN) 4 MG TABLET    Take 1 Tab by mouth every eight (8) hours as needed for Nausea. OTHER    DDS Lumbar Traction Belt  Use as directed    OXYCODONE IR 5 MG TAB 10 MG, ACETAMINOPHEN 325 MG  MG    Take  by mouth every four (4) hours as needed. POLYETHYLENE GLYCOL (MIRALAX) 17 GRAM/DOSE POWDER    Take 17 g by mouth daily. 1 tablespoon with 8 oz of water daily    SITAGLIPTIN-METFORMIN (JANUMET) 50-1,000 MG PER TABLET    Take 1 Tab by mouth two (2) times daily (with meals).    These Medications have changed    No medications on file   Stop Taking    No medications on file     _______________________________    Notes:    Garrison Goddard MD using Dragon dictation      _______________________________

## 2022-03-25 NOTE — LETTER
Jennifer Khan was seen and escorted by Tyshawn Yates in our emergency department on 3/25/2022. If you have any questions or concerns, please don't hesitate to call.
abd/groin pain

## 2022-07-06 ENCOUNTER — HOSPITAL ENCOUNTER (OUTPATIENT)
Dept: PHYSICAL THERAPY | Age: 67
Discharge: HOME | End: 2022-07-06
Payer: MEDICARE

## 2022-07-06 PROCEDURE — 97162 PT EVAL MOD COMPLEX 30 MIN: CPT

## 2022-07-06 NOTE — PROGRESS NOTES
PT DAILY TREATMENT NOTE     Patient Name: Kailyn Blanco  Date:2022  : 1955  [x]  Patient  Verified  Payor: Milan Garces / Plan: Syeda Unger / Product Type: Managed Care Medicare /    In time:1205  Out time:1249  Total Treatment Time (min): 44  Visit #: 1 of 10    Medicare/BCBS Only   Total Timed Codes (min):  eval only  1:1 Treatment Time:  eval only        Treatment Area: Pain in left elbow [M25.522]  Medial epicondylitis, left elbow [M77.02]    SUBJECTIVE  Pain Level (0-10 scale): 8  Any medication changes, allergies to medications, adverse drug reactions, diagnosis change, or new procedure performed?: [x] No    [] Yes (see summary sheet for update)  Subjective functional status/changes:   [] No changes reported  See POC    OBJECTIVE      41 min []Eval                  []Re-Eval           With   [] TE   [] TA   [] neuro   [] other: Patient Education: [x] Review HEP    [] Progressed/Changed HEP based on:   [] positioning   [] body mechanics   [] transfers   [] heat/ice application    [] other:      Other Objective/Functional Measures: see POC     Pain Level (0-10 scale) post treatment: 7    ASSESSMENT/Changes in Function: see POC    Patient will continue to benefit from skilled PT services to modify and progress therapeutic interventions, address functional mobility deficits, address ROM deficits, address strength deficits, analyze and address soft tissue restrictions, analyze and cue movement patterns, analyze and modify body mechanics/ergonomics, assess and modify postural abnormalities and instruct in home and community integration to attain remaining goals. [x]  See Plan of Care  []  See progress note/recertification  []  See Discharge Summary         Progress towards goals / Updated goals:  Short Term Goals: To be accomplished in 2 weeks:  1. Pt will compliance HEP in order to supplement PT treatment   Eval = established  2.    Pt will demonstrate left cervical AROM rotation to 45 in order to improve ability to drive   Eval = [id number]  3. Pt will demonstrate left shoulder AROM flexion to 150degrees  in order to improve ability to reach overhead   Eval = 130degrees    Long Term Goals: To be accomplished in 10 treatments:  1. Pt will score at least predicted value on FOTO in order to facilitate return to PLOF. Eval = assess at first follow up   2. Pt will demonstrate left elbow flexion AROM to 145degrees in order to improve ability to fish    Eval = 138degrees  3. Pt will demonstrate left  strength minimum 25lbs in order to improve ability to perform heavy ADLs.    Eval = 10lbs    PLAN  [x]  Upgrade activities as tolerated     [x]  Continue plan of care  []  Update interventions per flow sheet       []  Discharge due to:_  []  Other:_      Fidel Deras, PT 7/6/2022  11:42 AM    Future Appointments   Date Time Provider Elisa Pham   7/6/2022 12:00 PM Stacia Allen

## 2022-07-06 NOTE — PROGRESS NOTES
[facility] - [place] [address]  [address], [address] [phone] fax    Plan of Care/ Statement of Necessity for Physical Therapy Services           Patient name: Srini Webb Start of Care: 2022   Referral source: Georges Crook MD : 1955    Medical Diagnosis: Pain in left elbow [M25.522]  Medial epicondylitis, left elbow [M77.02]  Payor: [employer] / Plan: Evelia Mutters / Product Type: Managed Care Medicare /  Onset Date:22    Treatment Diagnosis: left elbow pain, neck pain   Prior Hospitalization: see medical history Provider#: [id number]   Medications: Verified on Patient summary List    Comorbidities: cervical disc herniations, OA, DM, HTN, hx of trigger finger surgeries,    Prior Level of Function: functionally (I) right hand dominant; retired    [employer] of Care and following information is based on the information from the initial evaluation. Assessment/ key information:   Ms. Grsielda Woods is a 74yo female who presents to PT with complaints of left elbow pain. Examination today suggests the cervical spine as a contributing factor to her pain complaints - pt spurling's positive for dural tension and cervical distraction reduced burning pain in the elbow but increased numbness/tingling complaints. Pt demonstrated decreased left cervical AROM, shoulder AROM, elbow AROM, and decreased elbow/wrist/ strength. Tendinitis/medial epicondylitis may also be a contributing factor as pt responded with decreased pain and increased  strength when utilizing counter force bracing to the flexor wad. Pt educated on use of counter force brace and activity modifications to reduce pain. Pt deficits impair her ability to perform ADLs, fish, and play with her grand children at [facility].  Pt will benefit from skilled PT in order to address listed deficits, decrease pain, and maximize functional potential.     Evaluation Complexity History MEDIUM  Complexity : 1-2 comorbidities / personal factors will impact the outcome/ POC ; Examination HIGH Complexity : 4+ Standardized tests and measures addressing body structure, function, activity limitation and / or participation in recreation  ;Presentation MEDIUM Complexity : Evolving with changing characteristics  ; Clinical Decision Making MEDIUM Complexity : FOTO score of 26-74  Overall Complexity Rating: MEDIUM  Problem List: pain affecting function, decrease ROM, decrease strength, edema affecting function, decrease ADL/ functional abilitiies, decrease activity tolerance and decrease flexibility/ joint mobility   Treatment Plan may include any combination of the following: Therapeutic exercise, Therapeutic activities, Neuromuscular re-education, Physical agent/modality, Manual therapy, Patient education, Self Care training, Functional mobility training and Home safety training  Patient / Family readiness to learn indicated by: asking questions, trying to perform skills and interest  Persons(s) to be included in education: patient (P)  Barriers to Learning/Limitations: None  Patient Goal (s): get mobility back without pain; be able to fish and play with my grandchildren  Patient Self Reported Health Status: good  Rehabilitation Potential: good  Short Term Goals: To be accomplished in 2 weeks:  1. Pt will compliance HEP in order to supplement PT treatment   Eval = established  2. Pt will demonstrate left cervical AROM rotation to 45 in order to improve ability to drive   Eval = [id number]  3. Pt will demonstrate left shoulder AROM flexion to 150degrees  in order to improve ability to reach overhead   Eval = 130degrees    Long Term Goals: To be accomplished in 10 treatments:  1. Pt will score at least predicted value on FOTO in order to facilitate return to PLOF. Eval = assess at first follow up   2.    Pt will demonstrate left elbow flexion AROM to 145degrees in order to improve ability to fish Eval = 138degrees  3. Pt will demonstrate left  strength minimum 25lbs in order to improve ability to perform heavy ADLs. Eval = 10lbs    Frequency / Duration: Patient to be seen 2 times per week for 10 treatments. Certification HIECRO:0/8/78-5/0/89    Patient/ Caregiver education and instruction: Diagnosis, prognosis, self care, activity modification, brace/ splint application and exercises   [x]  Plan of care has been reviewed with STACY Espinoza, PT 7/6/2022 11:42 AM    ________________________________________________________________________    I certify that the above Therapy Services are being furnished while the patient is under my care. I agree with the treatment plan and certify that this therapy is necessary.     [de-identified] Signature:____________Date:_________TIME:________     Tonny Mazariegos MD  ** Signature, Date and Time must be completed for valid certification **    Please sign and return to [facility] - [address], [address] [phone] fax

## 2022-07-15 ENCOUNTER — TELEPHONE (OUTPATIENT)
Dept: PHYSICAL THERAPY | Age: 67
End: 2022-07-15

## 2022-07-18 ENCOUNTER — TELEPHONE (OUTPATIENT)
Dept: PHYSICAL THERAPY | Age: 67
End: 2022-07-18

## 2022-07-25 ENCOUNTER — TELEPHONE (OUTPATIENT)
Dept: PHYSICAL THERAPY | Age: 67
End: 2022-07-25

## 2022-07-25 ENCOUNTER — APPOINTMENT (OUTPATIENT)
Dept: PHYSICAL THERAPY | Age: 67
End: 2022-07-25
Payer: MEDICARE

## 2022-07-26 ENCOUNTER — APPOINTMENT (OUTPATIENT)
Dept: PHYSICAL THERAPY | Age: 67
End: 2022-07-26
Payer: MEDICARE

## 2022-07-28 ENCOUNTER — APPOINTMENT (OUTPATIENT)
Dept: PHYSICAL THERAPY | Age: 67
End: 2022-07-28
Payer: MEDICARE

## 2022-08-01 ENCOUNTER — TELEPHONE (OUTPATIENT)
Dept: PHYSICAL THERAPY | Age: 67
End: 2022-08-01

## 2022-08-02 ENCOUNTER — HOSPITAL ENCOUNTER (OUTPATIENT)
Dept: PHYSICAL THERAPY | Age: 67
Discharge: HOME | End: 2022-08-02
Payer: MEDICARE

## 2022-08-02 PROCEDURE — 97110 THERAPEUTIC EXERCISES: CPT

## 2022-08-02 PROCEDURE — 97112 NEUROMUSCULAR REEDUCATION: CPT

## 2022-08-02 PROCEDURE — 97530 THERAPEUTIC ACTIVITIES: CPT

## 2022-08-02 NOTE — PROGRESS NOTES
PT DAILY TREATMENT NOTE     Patient Name: Dalila Arias  Date:2022  : 1955  [x]  Patient  Verified  Payor: Celina Ballard / Plan: Norm Healy / Product Type: Managed Care Medicare /    In time: 9:46  Out time: 10:27   Total Treatment Time (min): 41  Visit #: 2 of 10    Medicare/BCBS Only   Total Timed Codes (min):  41 1:1 Treatment Time:  41       Treatment Area: Pain in left elbow [M25.522]  Medial epicondylitis, left elbow [M77.02]  Cervicalgia [M54.2]    SUBJECTIVE  Pain Level (0-10 scale): 8/10  Any medication changes, allergies to medications, adverse drug reactions, diagnosis change, or new procedure performed?: [x] No    [] Yes (see summary sheet for update)  Subjective functional status/changes:   [] No changes reported  Pt states, \"I'm not feeling well.  I'll try\"    OBJECTIVE    Modality rationale: Patient declined    Min Type Additional Details    [] Estim:  []Unatt       []IFC  []Premod                        []Other:  []w/ice   []w/heat  Position:  Location:    [] Estim: []Att    []TENS instruct  []NMES                    []Other:  []w/US   []w/ice   []w/heat  Position:  Location:    []  Traction: [] Cervical       []Lumbar                       [] Prone          []Supine                       []Intermittent   []Continuous Lbs:  [] before manual  [] after manual    []  Ultrasound: []Continuous   [] Pulsed                           []1MHz   []3MHz W/cm2:  Location:    []  Iontophoresis with dexamethasone         Location: [] Take home patch   [] In clinic    []  Ice     []  heat  []  Ice massage  []  Laser   []  Anodyne Position:  Location:    []  Laser with stim  []  Other:  Position:  Location:    []  Vasopneumatic Device    []  Right     []  Left  Pre-treatment girth:  Post-treatment girth:  Measured at (location):  Pressure:       [] lo [] med [] hi   Temperature: [] lo [] med [] hi   [] Skin assessment post-treatment:  []intact []redness- no adverse reaction []redness - adverse reaction:         8 min Therapeutic Exercise:  [] See flow sheet : manual traction, PROM of cervical musculature, deep pressure point release on posterior cervical musculature    Rationale: increase ROM, increase strength, improve coordination, and increase proprioception to improve the patients ability to return to PLOF    21 min Therapeutic Activity:  [x]  See flow sheet : SB roll outs, Pulleys (flex), FOTO outcome measure completed,   Rationale: increase ROM, increase strength, improve coordination, and increase proprioception  to improve the patients ability to tolerate prolonged ADL's     12 min Neuromuscular Re-education:  [x]  See flow sheet : seated cervical rotations, extensions with towel mobilization, elbow iso's x2   Rationale: increase strength, improve coordination, and increase proprioception  to improve the patients ability to tolerate prolonged postures               With   [x] TE   [x] TA   [] neuro   [] other: Patient Education: [x] Review HEP    [] Progressed/Changed HEP based on:   [x] positioning   [x] body mechanics   [] transfers   [] heat/ice application    [] other:      Other Objective/Functional Measures:      Pain Level (0-10 scale) post treatment: 7/10    ASSESSMENT/Changes in Function:     Pt required moderate verbal cuing in order to hold contraction for prolonged period and slow down ROM in order to maximize motor recruitment/control. Pt was limited due to \"not feeling good\" today. Pt was able to tolerate treatment session without drastic increases in pain. Pt denied numbness/tingling throughout treatment session aside from manual traction. Pt advised after 8 minutes of manual traction/PROM she felt radicular sx's into the right UE that resolved by no longer performing PROM/manual traction.     Patient will continue to benefit from skilled PT services to modify and progress therapeutic interventions, address functional mobility deficits, address ROM deficits, address strength deficits, analyze and address soft tissue restrictions, analyze and cue movement patterns, analyze and modify body mechanics/ergonomics, assess and modify postural abnormalities, and instruct in home and community integration to attain remaining goals. []  See Plan of Care  []  See progress note/recertification  []  See Discharge Summary         Progress towards goals / Updated goals:  Short Term Goals: To be accomplished in 2 weeks:  1. Pt will compliance HEP in order to supplement PT treatment              Eval = established  2. Pt will demonstrate left cervical AROM rotation to 45 in order to improve ability to drive              Eval = 35degrees  3. Pt will demonstrate left shoulder AROM flexion to 150degrees  in order to improve ability to reach overhead              Eval = 130degrees     Long Term Goals: To be accomplished in 10 treatments:  1. Pt will score at least predicted value on FOTO in order to facilitate return to PLOF. Eval = assess at first follow up  2. Pt will demonstrate left elbow flexion AROM to 145degrees in order to improve ability to fish              Eval = 138degrees  3. Pt will demonstrate left  strength minimum 25lbs in order to improve ability to perform heavy ADLs.               Eval = 10lbs    PLAN  [x]  Upgrade activities as tolerated     [x]  Continue plan of care  []  Update interventions per flow sheet       []  Discharge due to:_  []  Other:_      Sherie Avila PT 8/2/2022  9:52 AM    Future Appointments   Date Time Provider Elisa Pham   8/4/2022 12:00 PM SOY Chowdhury [facility]   8/9/2022  9:45 AM SOY ChowdhuryPTTATI [facility]   8/11/2022  9:45 AM SOY Chowdhury [facility]   8/16/2022  9:45 AM SOY Chowdhury [facility]   8/18/2022  9:45 AM SOY Chowdhury [facility]

## 2022-08-04 ENCOUNTER — HOSPITAL ENCOUNTER (OUTPATIENT)
Dept: PHYSICAL THERAPY | Age: 67
Discharge: HOME | End: 2022-08-04
Payer: MEDICARE

## 2022-08-04 PROCEDURE — 97110 THERAPEUTIC EXERCISES: CPT

## 2022-08-04 PROCEDURE — 97530 THERAPEUTIC ACTIVITIES: CPT

## 2022-08-04 NOTE — PROGRESS NOTES
[facility] [place] [address]  [address], [address] [phone] fax    Continued Plan of Care/ Re-certification for Physical Therapy Services    Patient name: Sohail Corcoran Start of Care: 2022   Referral source: Toño Bloom MD : 1955                Medical Diagnosis: Pain in left elbow [M25.522]  Medial epicondylitis, left elbow [M77.02]  Payor: Enrrique Redd / Plan: Dragan Fisher / Product Type: [employer] Medicare /  Onset Date:22                Treatment Diagnosis: left elbow pain, neck pain   Prior Hospitalization: see medical history Provider#: [id number]   Medications: Verified on Patient summary List    Comorbidities: cervical disc herniations, OA, DM, HTN, hx of trigger finger surgeries,    Prior Level of Function: functionally (I) right hand dominant; retired  Visits from [facility]: 3    Missed Visits: 0    The Plan of Care and following information is based on the patient's current status:    Short Term Goals: To be accomplished in 2 weeks:  1. Pt will compliance HEP in order to supplement PT treatment              Eval = established   Recert: MET, Reports 3x/wk compliance   2. Pt will demonstrate left cervical AROM rotation to 45 in order to improve ability to drive              Eval = [id number]   Recert: MET, 50 degrees  3. Pt will demonstrate left shoulder AROM flexion to 150degrees  in order to improve ability to reach overhead              Eval = [id number]   Recert: NOT MET, 65 degrees with pain     Long Term Goals: To be accomplished in 10 treatments:  1. Pt will score at least predicted value on FOTO in order to facilitate return to PLOF. Eval = assess at first follow up, scored 48  Recert: NOT MET, 60  2. Pt will demonstrate left elbow flexion AROM to 145degrees in order to improve ability to fish              Eval = [id number]   Recert: NOT MET, 252 degrees  3.    Pt will demonstrate left  strength minimum 25lbs in order to improve ability to perform heavy ADLs. Eval = [id number]   Recert: NOT MET, 11 lbs    Key functional changes:     Functional Gains: Pain has decreased, strength increases  Functional Deficits: strength, pain is still elevated, fishing is challenging   % improvement: 60%  Pain   Average: 5/10                  Best: 4/10                Worst: 8.5/10  Patient Goal: \"To be able to do all the activities I usually do without the pain\"     Problems/ barriers to goal attainment: increased pain compared to PLOF, decreased use of left UE    Problem List: pain affecting function, decrease ROM, decrease strength, decrease ADL/ functional abilitiies, decrease activity tolerance, and decrease flexibility/ joint mobility    Treatment Plan: Therapeutic exercise, Therapeutic activities, Neuromuscular re-education, Physical agent/modality, Gait/balance training, Manual therapy, Patient education, Self Care training, Functional mobility training, Home safety training, and Stair training     Patient Goal (s) has been updated and includes: see above    Goals for this certification period to be accomplished in 10 treatments:    1. Pt will demonstrate left shoulder AROM flexion to 150degrees  in order to improve ability to reach overhead  Recert: 65 degrees with pain  2. Pt will score at least predicted value on FOTO in order to facilitate return to PLOF. Recert: 60 with predicted value of 66  3. Pt will demonstrate left elbow flexion AROM to 145degrees in order to improve ability to fish  Recert: 980 degrees  4. Pt will demonstrate left  strength minimum 25lbs in order to improve ability to perform heavy ADLs. Recert: 11 lbs    Frequency / Duration: Patient to be seen 2-3 times per week for 10 treatments:    Assessment / Recommendations:    Pt reports 60% improvement made with skilled physical therapy treatment.  Pt has made improvements in cervical rotation, pain management, and reports increase functional tolerance to prolonged activities. However despite progress made, pt is still not at the PLOF. Pot would benefit from ongoing skilled therapy to address strength deficits, ROM limitations, and increased overall pain. Certification Period: 08/05/2022-09/03/2022    Viola Clemens, PT 8/4/2022 12:07 PM    ________________________________________________________________________  I certify that the above Therapy Services are being furnished while the patient is under my care. I agree with the treatment plan and certify that this therapy is necessary. [] I have read the above and request that my patient continue as recommended.   [] I have read the above report and request that my patient continue therapy with the following changes/special instructions: _____________________________________________  [] I have read the above report and request that my patient be discharged from therapy    Physician's Signature:____________Date:_________TIME:________     Scott Riley MD  ** Signature, Date and Time must be completed for valid certification **    Please sign and return to [facility] [address]  [address], [address] [phone] fax

## 2022-08-04 NOTE — PROGRESS NOTES
PT DAILY TREATMENT NOTE     Patient Name: Soni Hudson  Date:2022  : 1955  [x]  Patient  Verified  Payor: Enrrique Binning / Plan: Collin Thompson / Product Type: Managed Care Medicare /    In time: 12:05  Out time: 12:44  Total Treatment Time (min): 39  Visit #: 3 of 10    Medicare/BCBS Only   Total Timed Codes (min):  39 1:1 Treatment Time:  39       Treatment Area: Pain in left elbow [M25.522]  Medial epicondylitis, left elbow [M77.02]  Cervicalgia [M54.2]    SUBJECTIVE  Pain Level (0-10 scale): 4/10  Any medication changes, allergies to medications, adverse drug reactions, diagnosis change, or new procedure performed?: [x] No    [] Yes (see summary sheet for update)  Subjective functional status/changes:   [] No changes reported  Pt states, \"I feel much better\"    OBJECTIVE    12 min Therapeutic Exercise:  [x] See flow sheet :   Rationale: increase ROM, increase strength, improve coordination, and increase proprioception to improve the patients ability to return to PLOF    27 min Therapeutic Activity:  [x]  See flow sheet : Reassessment (recert), SB roll outs, FOTO outcome measure, gripper, wall walk outs, pulley's   Rationale: increase strength, improve coordination, and increase proprioception  to improve the patients ability to tolerate overhead reaching              With   [x] TE   [x] TA   [] neuro   [] other: Patient Education: [x] Review HEP    [] Progressed/Changed HEP based on:   [x] positioning   [x] body mechanics   [] transfers   [] heat/ice application    [] other:      Other Objective/Functional Measures: see recert     Pain Level (0-10 scale) post treatment: 4.5/5    ASSESSMENT/Changes in Function:     Patient will continue to benefit from skilled PT services to modify and progress therapeutic interventions, address functional mobility deficits, address ROM deficits, address strength deficits, analyze and address soft tissue restrictions, analyze and cue movement patterns, analyze and modify body mechanics/ergonomics, assess and modify postural abnormalities, and instruct in home and community integration to attain remaining goals. []  See Plan of Care  [x]  See progress note/recertification  []  See Discharge Summary         Progress towards goals / Updated goals:  1. Pt will demonstrate left shoulder AROM flexion to 150degrees  in order to improve ability to reach overhead  Recert: 65 degrees with pain  2. Pt will score at least predicted value on FOTO in order to facilitate return to PLOF. Recert: 60 with predicted value of 66  3. Pt will demonstrate left elbow flexion AROM to 145degrees in order to improve ability to fish  Recert: 583 degrees  4. Pt will demonstrate left  strength minimum 25lbs in order to improve ability to perform heavy ADLs.   Recert: 11 lbs    PLAN  [x]  Upgrade activities as tolerated     [x]  Continue plan of care  []  Update interventions per flow sheet       []  Discharge due to:_  []  Other:_      West Duarte, PT 8/4/2022  12:08 PM    Future Appointments   Date Time Provider Elisa Pham   8/9/2022  9:45 AM Lucio Navarro PT [facility]   8/11/2022  9:45 AM Lucio Navarro PT [facility]   8/16/2022  9:45 AM SOY GlasgowPT [facility]   8/18/2022  9:45 AM Lucio Navarro PT [facility]

## 2022-08-09 ENCOUNTER — HOSPITAL ENCOUNTER (OUTPATIENT)
Dept: PHYSICAL THERAPY | Age: 67
Discharge: HOME | End: 2022-08-09
Payer: MEDICARE

## 2022-08-09 PROCEDURE — 97110 THERAPEUTIC EXERCISES: CPT

## 2022-08-09 PROCEDURE — 97012 MECHANICAL TRACTION THERAPY: CPT

## 2022-08-09 PROCEDURE — 97112 NEUROMUSCULAR REEDUCATION: CPT

## 2022-08-09 PROCEDURE — 97140 MANUAL THERAPY 1/> REGIONS: CPT

## 2022-08-09 NOTE — PROGRESS NOTES
PT DAILY TREATMENT NOTE     Patient Name: Rose Chamberlain  Date:2022  : 1955  [x]  Patient  Verified  Payor: Tammi Childers / Plan: [employer]  / Product Type: Managed Care Medicare /    In time:945  Out time:1037  Total Treatment Time (min): 52  Visit #: 1 of 10    Medicare/BCBS Only   Total Timed Codes (min):  42 1:1 Treatment Time:  42       Treatment Area: Pain in left elbow [M25.522]  Medial epicondylitis, left elbow [M77.02]  Cervicalgia [M54.2]    SUBJECTIVE  Pain Level (0-10 scale): 7  Any medication changes, allergies to medications, adverse drug reactions, diagnosis change, or new procedure performed?: [x] No    [] Yes (see summary sheet for update)  Subjective functional status/changes:   [] No changes reported  \"I'm hurting in my neck and left arm today. \"    OBJECTIVE    Modality rationale: decrease pain to improve the patients ability to improve mobility and positional tolerance   Min Type Additional Details    [] Estim:  []Unatt       []IFC  []Premod                        []Other:  []w/ice   []w/heat  Position:  Location:    [] Estim: []Att    []TENS instruct  []NMES                    []Other:  []w/US   []w/ice   []w/heat  Position:  Location:   10 [x]  Traction: [x] Cervical       []Lumbar                       [] Prone          [x]Supine                       [x]Intermittent   []Continuous Lbs: 12:6  [] before manual  [x] after manual    []  Ultrasound: []Continuous   [] Pulsed                           []1MHz   []3MHz W/cm2:  Location:    []  Iontophoresis with dexamethasone         Location: [] Take home patch   [] In clinic    []  Ice     []  heat  []  Ice massage  []  Laser   []  Anodyne Position:  Location:    []  Laser with stim  []  Other:  Position:  Location:    []  Vasopneumatic Device    []  Right     []  Left  Pre-treatment girth:  Post-treatment girth:  Measured at (location):  Pressure:       [] lo [] med [] hi   Temperature: [] lo [] med [] hi [x] Skin assessment post-treatment:  [x]intact []redness- no adverse reaction    []redness - adverse reaction:     10 min Therapeutic Exercise:  [x] See flow sheet :   Rationale: increase ROM and increase strength to improve the patients ability to perform ADLs    24 min Neuromuscular Re-education:  [x]  See flow sheet : scap and cervical re-ed activities    Rationale: increase ROM, increase strength, improve coordination, improve balance, and increase proprioception  to improve the patients ability to improve mobility and upright posture    8 min Manual Therapy:  SOR, STM to c/s paraspinals and B UT, gentle manual cervical traction    The manual therapy interventions were performed at a separate and distinct time from the therapeutic activities interventions. Rationale: decrease pain, increase ROM, increase tissue extensibility, decrease trigger points, and increase postural awareness to improve mobility and positional tolerance          With   [x] TE   [] TA   [x] neuro   [] other: Patient Education: [x] Review HEP    [] Progressed/Changed HEP based on:   [x] positioning   [x] body mechanics   [] transfers   [] heat/ice application    [] other:      Other Objective/Functional Measures:      Pain Level (0-10 scale) post treatment: 5    ASSESSMENT/Changes in Function: Pt reported tingling down left UE to her elbow throughout treatment. After manual therapy and mechanical cervical traction she reports that this subsided and reduced. Assess carryover of traction at NV.     Patient will continue to benefit from skilled PT services to modify and progress therapeutic interventions, address functional mobility deficits, address ROM deficits, address strength deficits, analyze and address soft tissue restrictions, analyze and cue movement patterns, analyze and modify body mechanics/ergonomics, assess and modify postural abnormalities, address imbalance/dizziness, and instruct in home and community integration to attain remaining goals. [x]  See Plan of Care  []  See progress note/recertification  []  See Discharge Summary         Progress towards goals / Updated goals:  1. Pt will demonstrate left shoulder AROM flexion to 150degrees  in order to improve ability to reach overhead  Recert: 65 degrees with pain  2. Pt will score at least predicted value on FOTO in order to facilitate return to PLOF. Recert: 60 with predicted value of 66  3. Pt will demonstrate left elbow flexion AROM to 145degrees in order to improve ability to fish  Recert: 247 degrees  4. Pt will demonstrate left  strength minimum 25lbs in order to improve ability to perform heavy ADLs.   Recert: 11 lbs    PLAN  []  Upgrade activities as tolerated     [x]  Continue plan of care  []  Update interventions per flow sheet       []  Discharge due to:_  []  Other:_      [place] Oats, PTA, CSCS 8/9/2022  10:43 AM    Future Appointments   Date Time Provider Elisa Pham   8/11/2022  9:45 AM Kamar Carias PT [facility]   8/16/2022  9:45 AM Kamar Carias PT [facility]   8/18/2022  9:45 AM Kamar Carias PT [facility]

## 2022-08-11 ENCOUNTER — HOSPITAL ENCOUNTER (OUTPATIENT)
Dept: PHYSICAL THERAPY | Age: 67
Discharge: HOME | End: 2022-08-11
Payer: MEDICARE

## 2022-08-11 PROCEDURE — 97112 NEUROMUSCULAR REEDUCATION: CPT

## 2022-08-11 PROCEDURE — 97012 MECHANICAL TRACTION THERAPY: CPT

## 2022-08-11 PROCEDURE — 97110 THERAPEUTIC EXERCISES: CPT

## 2022-08-11 PROCEDURE — 97140 MANUAL THERAPY 1/> REGIONS: CPT

## 2022-08-12 ENCOUNTER — TRANSCRIBE ORDER (OUTPATIENT)
Dept: SCHEDULING | Age: 67
End: 2022-08-12

## 2022-08-12 DIAGNOSIS — L70.9 SAPHO SYNDROME (HCC): Primary | ICD-10-CM

## 2022-08-12 DIAGNOSIS — Z13.6 SCREENING FOR ISCHEMIC HEART DISEASE: Primary | ICD-10-CM

## 2022-08-12 DIAGNOSIS — M86.9 SAPHO SYNDROME (HCC): Primary | ICD-10-CM

## 2022-08-12 DIAGNOSIS — L40.3 SAPHO SYNDROME (HCC): Primary | ICD-10-CM

## 2022-08-12 DIAGNOSIS — Z12.31 BREAST CANCER SCREENING BY MAMMOGRAM: Primary | ICD-10-CM

## 2022-08-12 DIAGNOSIS — M85.80 SAPHO SYNDROME (HCC): Primary | ICD-10-CM

## 2022-08-12 DIAGNOSIS — M65.9 SAPHO SYNDROME (HCC): Primary | ICD-10-CM

## 2022-08-16 ENCOUNTER — HOSPITAL ENCOUNTER (OUTPATIENT)
Dept: PHYSICAL THERAPY | Age: 67
Discharge: HOME | End: 2022-08-16
Payer: MEDICARE

## 2022-08-16 PROCEDURE — 97012 MECHANICAL TRACTION THERAPY: CPT

## 2022-08-16 PROCEDURE — 97110 THERAPEUTIC EXERCISES: CPT

## 2022-08-16 PROCEDURE — 97112 NEUROMUSCULAR REEDUCATION: CPT

## 2022-08-16 PROCEDURE — 97140 MANUAL THERAPY 1/> REGIONS: CPT

## 2022-08-18 ENCOUNTER — HOSPITAL ENCOUNTER (OUTPATIENT)
Dept: PHYSICAL THERAPY | Age: 67
Discharge: HOME | End: 2022-08-18
Payer: MEDICARE

## 2022-08-18 ENCOUNTER — TRANSCRIBE ORDER (OUTPATIENT)
Dept: SCHEDULING | Age: 67
End: 2022-08-18

## 2022-08-18 PROCEDURE — 97140 MANUAL THERAPY 1/> REGIONS: CPT

## 2022-08-18 PROCEDURE — 97110 THERAPEUTIC EXERCISES: CPT

## 2022-08-18 PROCEDURE — 97012 MECHANICAL TRACTION THERAPY: CPT

## 2022-08-18 PROCEDURE — 97112 NEUROMUSCULAR REEDUCATION: CPT

## 2022-08-18 NOTE — PROGRESS NOTES
PT DAILY TREATMENT NOTE     Patient Name: Una Varma  Date:2022  : 1955  [x]  Patient  Verified  Payor: Rozina Aiken / Plan: [employer] / Product Type: Managed Care Medicare /    In time:935  Out time:1031  Total Treatment Time (min): 56  Visit #: 4 of 10    Medicare/BCBS Only   Total Timed Codes (min):  46 1:1 Treatment Time:  46       Treatment Area: Pain in left elbow [M25.522]  Medial epicondylitis, left elbow [M77.02]  Cervicalgia [M54.2]    SUBJECTIVE  Pain Level (0-10 scale): 5  Any medication changes, allergies to medications, adverse drug reactions, diagnosis change, or new procedure performed?: [x] No    [] Yes (see summary sheet for update)  Subjective functional status/changes:   [] No changes reported  \"My neck and arm are hurting. \"    OBJECTIVE    Modality rationale: decrease pain to improve the patients ability to improve mobility and positional tolerance   Min Type Additional Details    [] Estim:  []Unatt       []IFC  []Premod                        []Other:  []w/ice   []w/heat  Position:  Location:    [] Estim: []Att    []TENS instruct  []NMES                    []Other:  []w/US   []w/ice   []w/heat  Position:  Location:   10 [x]  Traction: [x] Cervical       []Lumbar                       [] Prone          [x]Supine                       [x]Intermittent   []Continuous Lbs: 14:7  [] before manual  [x] after manual    []  Ultrasound: []Continuous   [] Pulsed                           []1MHz   []3MHz W/cm2:  Location:    []  Iontophoresis with dexamethasone         Location: [] Take home patch   [] In clinic    []  Ice     []  heat  []  Ice massage  []  Laser   []  Anodyne Position:  Location:    []  Laser with stim  []  Other:  Position:  Location:    []  Vasopneumatic Device    []  Right     []  Left  Pre-treatment girth:  Post-treatment girth:  Measured at (location):  Pressure:       [] lo [] med [] hi   Temperature: [] lo [] med [] hi   [x] Skin assessment post-treatment:  [x]intact []redness- no adverse reaction    []redness - adverse reaction:     10 min Therapeutic Exercise:  [x] See flow sheet :   Rationale: increase ROM and increase strength to improve the patients ability to perform ADLs    28 min Neuromuscular Re-education:  [x]  See flow sheet : scap and cervical re-ed activities    Rationale: increase ROM, increase strength, improve coordination, improve balance, and increase proprioception  to improve the patients ability to improve mobility and upright posture    8 min Manual Therapy:  SOR, STM to c/s paraspinals and B UT, gentle manual cervical traction    The manual therapy interventions were performed at a separate and distinct time from the therapeutic activities interventions. Rationale: decrease pain, increase ROM, increase tissue extensibility, decrease trigger points, and increase postural awareness to improve mobility and reaching         With   [x] TE   [] TA   [x] neuro   [] other: Patient Education: [x] Review HEP    [] Progressed/Changed HEP based on:   [x] positioning   [x] body mechanics   [] transfers   [] heat/ice application    [] other:      Other Objective/Functional Measures:   AROM left shoulder flexion 114 deg  AROM left elbow flexion 145 deg     Left  strength 37 lbs    Pain Level (0-10 scale) post treatment: 0    ASSESSMENT/Changes in Function: Pt is making progress with her shoulder and elbow ROM. Also demonstrates an increase in left  strength. Continues to respond well to traction. Still remains limited with progressing some exercises secondary to elevated low back pain recently.      Patient will continue to benefit from skilled PT services to modify and progress therapeutic interventions, address functional mobility deficits, address ROM deficits, address strength deficits, analyze and address soft tissue restrictions, analyze and cue movement patterns, analyze and modify body mechanics/ergonomics, assess and modify postural abnormalities, address imbalance/dizziness, and instruct in home and community integration to attain remaining goals. [x]  See Plan of Care  []  See progress note/recertification  []  See Discharge Summary         Progress towards goals / Updated goals:  1. Pt will demonstrate left shoulder AROM flexion to 150degrees  in order to improve ability to reach overhead  Recert: 65 degrees with pain  PROGRESSING; 114 deg  2. Pt will score at least predicted value on FOTO in order to facilitate return to PLOF. Recert: 60 with predicted value of 66  Assess at 30 day lfako  3. Pt will demonstrate left elbow flexion AROM to 145degrees in order to improve ability to fish  Recert: 789 degrees  MET; 145 deg  4. Pt will demonstrate left  strength minimum 25lbs in order to improve ability to perform heavy ADLs.   Recert: 11 lbs  MET; 37 lbs    PLAN  []  Upgrade activities as tolerated     [x]  Continue plan of care  []  Update interventions per flow sheet       []  Discharge due to:_  []  Other:_      Hayde Awad PTA, CSCS 8/18/2022  10:36 AM    Future Appointments   Date Time Provider Elisa Pham   8/18/2022  9:45 AM Marline Simmons, PT [facility]   8/22/2022  9:00 AM Marline Simmons, SO[facility]   8/24/2022  9:00 [facility]

## 2022-08-22 ENCOUNTER — HOSPITAL ENCOUNTER (OUTPATIENT)
Dept: PHYSICAL THERAPY | Age: 67
Discharge: HOME | End: 2022-08-22
Payer: MEDICARE

## 2022-08-22 PROCEDURE — 97012 MECHANICAL TRACTION THERAPY: CPT

## 2022-08-22 PROCEDURE — 97140 MANUAL THERAPY 1/> REGIONS: CPT

## 2022-08-22 PROCEDURE — 97112 NEUROMUSCULAR REEDUCATION: CPT

## 2022-08-22 PROCEDURE — 97110 THERAPEUTIC EXERCISES: CPT

## 2022-08-22 NOTE — PROGRESS NOTES
PT DAILY TREATMENT NOTE     Patient Name: Lucie Mccoy  Date:2022  : 1955  [x]  Patient  Verified  Payor: Mirian Clock / Plan: Fernando Alejo / Product Type: Managed Care Medicare /    In time:858  Out time:954  Total Treatment Time (min): 56  Visit #: 5 of 10    Medicare/BCBS Only   Total Timed Codes (min):  46 1:1 Treatment Time:  46       Treatment Area: Pain in left elbow [M25.522]  Medial epicondylitis, left elbow [M77.02]  Cervicalgia [M54.2]    SUBJECTIVE  Pain Level (0-10 scale): 7  Any medication changes, allergies to medications, adverse drug reactions, diagnosis change, or new procedure performed?: [x] No    [] Yes (see summary sheet for update)  Subjective functional status/changes:   [] No changes reported  \"My neck started hurting mid-day Friday and I have pain down my left arm to my thumb. \"    OBJECTIVE    Modality rationale: decrease pain and increase tissue extensibility to improve the patients ability to improve mobility and positional tolerance   Min Type Additional Details    [] Estim:  []Unatt       []IFC  []Premod                        []Other:  []w/ice   []w/heat  Position:  Location:    [] Estim: []Att    []TENS instruct  []NMES                    []Other:  []w/US   []w/ice   []w/heat  Position:  Location:   10 [x]  Traction: [x] Cervical       []Lumbar                       [] Prone          [x]Supine                       [x]Intermittent   []Continuous Lbs: 14:7  [] before manual  [x] after manual    []  Ultrasound: []Continuous   [] Pulsed                           []1MHz   []3MHz W/cm2:  Location:    []  Iontophoresis with dexamethasone         Location: [] Take home patch   [] In clinic    []  Ice     []  heat  []  Ice massage  []  Laser   []  Anodyne Position:  Location:    []  Laser with stim  []  Other:  Position:  Location:    []  Vasopneumatic Device    []  Right     []  Left  Pre-treatment girth:  Post-treatment girth:  Measured at (location):  Pressure:       [] lo [] med [] hi   Temperature: [] lo [] med [] hi   [x] Skin assessment post-treatment:  [x]intact []redness- no adverse reaction    []redness - adverse reaction:     10 min Therapeutic Exercise:  [x] See flow sheet :   Rationale: increase ROM and increase strength to improve the patients ability to perform ADLs    28 min Neuromuscular Re-education:  [x]  See flow sheet : cervical and scapular re-ed activities   Rationale: increase ROM, increase strength, improve coordination, improve balance, and increase proprioception  to improve the patients ability to improve mobility and ADL performance    8 min Manual Therapy:  SOR, STM to c/s paraspinals and B UT, gentle manual cervical traction    The manual therapy interventions were performed at a separate and distinct time from the therapeutic activities interventions. Rationale: decrease pain, increase ROM, increase tissue extensibility, decrease trigger points, and increase postural awareness to improve mobility and positional tolerance        With   [x] TE   [] TA   [x] neuro   [] other: Patient Education: [x] Review HEP    [] Progressed/Changed HEP based on:   [x] positioning   [x] body mechanics   [] transfers   [] heat/ice application    [] other:      Other Objective/Functional Measures:      Pain Level (0-10 scale) post treatment: 1    ASSESSMENT/Changes in Function: Pt reports increased radicular sx over the weekend, but these resolved following treatment today. Her pain continues to wax and wane. Limited with progressing some exercises due to low back pain, but able to progress some reaching activities today. Pt to be out of town after [place] so we will perform reassessment at . [address].      Patient will continue to benefit from skilled PT services to modify and progress therapeutic interventions, address functional mobility deficits, address ROM deficits, address strength deficits, analyze and address soft tissue restrictions, analyze and cue movement patterns, analyze and modify body mechanics/ergonomics, assess and modify postural abnormalities, address imbalance/dizziness, and instruct in home and community integration to attain remaining goals. [x]  See Plan of Care  []  See progress note/recertification  []  See Discharge Summary         Progress towards goals / Updated goals:  1. Pt will demonstrate left shoulder AROM flexion to 150degrees  in order to improve ability to reach overhead  Recert: 65 degrees with pain  PROGRESSING; 114 deg  2. Pt will score at least predicted value on FOTO in order to facilitate return to PLOF. Recert: 60 with predicted value of 66  Assess at 30 day flako  3. Pt will demonstrate left elbow flexion AROM to 145degrees in order to improve ability to fish  Recert: 321 degrees  MET; 145 deg  4. Pt will demonstrate left  strength minimum 25lbs in order to improve ability to perform heavy ADLs.   Recert: 11 lbs  MET; 37 lbs    PLAN  []  Upgrade activities as tolerated     [x]  Continue plan of care  []  Update interventions per flow sheet       []  Discharge due to:_  []  Other:_      Shama Montero PTA, CSCS 8/22/2022  10:08 AM    Future Appointments   Date Time Provider Elisa Pham   8/24/2022  9:00 [facility]   8/25/2022 11:30 AM [facility] RM 1 2D [facility]

## 2022-08-24 ENCOUNTER — HOSPITAL ENCOUNTER (OUTPATIENT)
Dept: PHYSICAL THERAPY | Age: 67
Discharge: HOME | End: 2022-08-24
Payer: MEDICARE

## 2022-08-24 PROCEDURE — 97110 THERAPEUTIC EXERCISES: CPT

## 2022-08-24 PROCEDURE — 97530 THERAPEUTIC ACTIVITIES: CPT

## 2022-08-24 PROCEDURE — 97112 NEUROMUSCULAR REEDUCATION: CPT

## 2022-08-24 NOTE — PROGRESS NOTES
[facility] - [facility]  [address], [address] [phone] fax    Continued Plan of Care/ Re-certification for Physical Therapy Services    Patient name: Ashley Marin Start of Care: 2022   Referral source: Lizette Pelaez MD : 1955                Medical Diagnosis: Pain in left elbow [M25.522]  Medial epicondylitis, left elbow [M77.02]  Payor: Keith Aguirre / Plan: Daisy Blackwell / Product Type: Managed Care Medicare /  Onset Date:22                Treatment Diagnosis: left elbow pain, neck pain   Prior Hospitalization: see medical history Provider#: [id number]   Medications: Verified on Patient summary List    Comorbidities: cervical disc herniations, OA, DM, HTN, hx of trigger finger surgeries,    Prior Level of Function: functionally (I) right hand dominant; retired  Visits from [facility]: 9    Missed Visits: 0    The Plan of Care and following information is based on the patient's current status:  Goal: Pt will demonstrate left shoulder AROM flexion to 150degrees  in order to improve ability to reach overhead  Status at last note/certification: PROGRESSING; 114 deg  Current Status: not met    Goal: Pt will score at least predicted value on FOTO in order to facilitate return to PLOF. Status at last note/certification: not met, 50 points  Current Status: not met    Goal: Pt will demonstrate left elbow flexion AROM to 145degrees in order to improve ability to fish  Status at last note/certification: MET; 921 deg  Current Status: met    Goal: Pt will demonstrate left  strength minimum 25lbs in order to improve ability to perform heavy ADLs.   Status at last note/certification: MET; 40 lbs  Current Status: met    Key functional changes:   Functional Gains: feels better after therapy, reaching better, more hand strength  Functional Deficits: continued fluctuating pain, picking up and gripping objects (10-15lbs), unable to fish  % improvement: 50%  Pain   Average: 7.5-8.5/10- at night with meds                  Best: 0/10- after therapy                Worst: 8/10- 7 day max      Problems/ barriers to goal attainment: none    Problem List: pain affecting function, decrease ROM, decrease strength, decrease ADL/ functional abilitiies, decrease activity tolerance, decrease flexibility/ joint mobility, and decrease transfer abilities    Treatment Plan: Therapeutic exercise, Therapeutic activities, Neuromuscular re-education, Physical agent/modality, Manual therapy, Patient education, Self Care training, Functional mobility training, and Home safety training     Patient Goal (s) has been updated and includes: \"To get my full motion back and be able to do all the things I was doing before the pain; make the pain more manageable\"     Goals for this certification period to be accomplished in 4 weeks:  1. Pt will demonstrate left shoulder AROM flexion to 150degrees  in order to improve ability to reach overhead  Re-certification: 801 deg  2. Pt will score at least predicted value on FOTO in order to facilitate return to PLOF. Re-certification: 50 points  3. Pt will report having minimal to no increased pain with gripping and picking up a 10lb kettlebell to improve ease of household chores. Re-certification: increased pain and difficulty with picking up and gripping objects (10-15lbs). 4. Pt will report an improvement in at worst pain to 5/10 to improve her ability to fish. Re-certification: 4/86 at worst    Frequency / Duration: Patient to be seen 2 times per week for 4 weeks:    Assessment / Recommendations:  Pt reports 50% improvement since starting therapy stating she feels better after therapy, she is reaching better, and her hand strength has improved. She continues to have fluctuating pain and is unable to perform her usual hobbies and daily activities.  She has made good progress with left shoulder and elbow ROM as well as  strength. She will benefit from continued therapy to further improve strength and ROM to return to PLOF. Certification Period: 8/25/2022-9/23/2022    Jeff Lima, PT 8/24/2022 1:53 PM    ________________________________________________________________________  I certify that the above Therapy Services are being furnished while the patient is under my care. I agree with the treatment plan and certify that this therapy is necessary. [] I have read the above and request that my patient continue as recommended.   [] I have read the above report and request that my patient continue therapy with the following changes/special instructions: _____________________________________________  [] I have read the above report and request that my patient be discharged from therapy    Physician's Signature:____________Date:_________TIME:________     Lorena East MD  ** Signature, Date and Time must be completed for valid certification **    Please sign and return to [facility] [address]  [address] [facility] [address], [address] [phone] fax

## 2022-08-24 NOTE — PROGRESS NOTES
PT DAILY TREATMENT NOTE     Patient Name: Segundo Lewis  Date:2022  : 1955  [x]  Patient  Verified  Payor: Deric Chung / Plan: Kat Klein / Product Type: Managed Care Medicare /    In time:901  Out time:947  Total Treatment Time (min): 46  Visit #: 6 of 10    Medicare/BCBS Only   Total Timed Codes (min):  46 1:1 Treatment Time:  46       Treatment Area: Pain in left elbow [M25.522]  Medial epicondylitis, left elbow [M77.02]  Cervicalgia [M54.2]    SUBJECTIVE  Pain Level (0-10 scale): 6.5  Any medication changes, allergies to medications, adverse drug reactions, diagnosis change, or new procedure performed?: [x] No    [] Yes (see summary sheet for update)  Subjective functional status/changes:   [] No changes reported  \"My pain is stupid today. \" Patient reports neck pain and pain down the left arm into her hand. OBJECTIVE    9 min Therapeutic Exercise:  [x] See flow sheet :   Rationale: increase ROM and increase strength to improve the patients ability to increase activity tolerance    20 min Therapeutic Activity:  [x]  See flow sheet : FOTO, goals assessment   Rationale: increase ROM, increase strength, and improve coordination  to improve the patients ability to perform heavy ADLs     10 min Neuromuscular Re-education:  [x]  See flow sheet :  strengthening and cervical re-ed   Rationale: increase strength, improve coordination, improve balance, and increase proprioception  to improve the patients ability to tolerate sustained postures    7 min Manual Therapy:  gentle SOR, cervical traction, and STM to B UT   The manual therapy interventions were performed at a separate and distinct time from the therapeutic activities interventions.   Rationale: decrease pain, increase ROM, increase tissue extensibility, and decrease trigger points to improve functional mobility of neck and left shoulder        With   [] TE   [] TA   [] neuro   [] other: Patient Education: [x] Review HEP    [] Progressed/Changed HEP based on:   [] positioning   [] body mechanics   [] transfers   [] heat/ice application    [] other:      Other Objective/Functional Measures: see goals     Pain Level (0-10 scale) post treatment: 3- neck, 6- left arm    ASSESSMENT/Changes in Function: Ms. Mahesh Barton reports 50% improvement since starting therapy stating she feels better after therapy, she is reaching better, and her hand strength has improved. She continues to have fluctuating pain and is unable to perform her usual hobbies and daily activities. She has made good progress with left shoulder and elbow ROM as well as  strength. She will benefit from continued therapy to further improve strength and ROM to return to PLOF. Patient will continue to benefit from skilled PT services to modify and progress therapeutic interventions, address functional mobility deficits, address ROM deficits, address strength deficits, analyze and address soft tissue restrictions, analyze and cue movement patterns, analyze and modify body mechanics/ergonomics, assess and modify postural abnormalities, address imbalance/dizziness, and instruct in home and community integration to attain remaining goals. []  See Plan of Care  []  See progress note/recertification  []  See Discharge Summary         Progress towards goals / Updated goals:  1. Pt will demonstrate left shoulder AROM flexion to 150degrees  in order to improve ability to reach overhead  Recert: 65 degrees with pain  PROGRESSING; 114 deg  2. Pt will score at least predicted value on FOTO in order to facilitate return to PLOF. Recert: 60 with predicted value of 66  Assess at 30 day flako  3. Pt will demonstrate left elbow flexion AROM to 145degrees in order to improve ability to fish  Recert: 657 degrees  MET; 145 deg  4. Pt will demonstrate left  strength minimum 25lbs in order to improve ability to perform heavy ADLs.   Recert: 11 lbs  MET; 40 lbs    Functional Gains: feels better after therapy, reaching better, more hand strength  Functional Deficits: continued fluctuating pain, picking up and gripping objects (10-15lbs), unable to fish  % improvement: 50%  Pain   Average: 7.5-8.5/10- at night with meds       Best: 0/10- after therapy     Worst: 8/10- 7 day max  Patient Goal: \"To get my full motion back and be able to do all the things I was doing before the pain; make the pain more manageable\"    PLAN  [x]  Upgrade activities as tolerated     [x]  Continue plan of care  []  Update interventions per flow sheet       []  Discharge due to:_  []  Other:_      Maddigwen Milner, [facility] 8/24/2022  9:01 AM    Future Appointments   Date Time Provider Elisa Pham   8/25/2022 11:30 AM [facility] RM 1 2D [facility]   9/7/2022  9:00 AM Jd Israel DO [facility] AMB

## 2022-08-25 ENCOUNTER — HOSPITAL ENCOUNTER (OUTPATIENT)
Dept: MAMMOGRAPHY | Age: 67
Discharge: HOME | End: 2022-08-25
Attending: NURSE PRACTITIONER
Payer: MEDICARE

## 2022-08-25 DIAGNOSIS — Z12.31 BREAST CANCER SCREENING BY MAMMOGRAM: ICD-10-CM

## 2022-08-25 PROCEDURE — 77063 BREAST TOMOSYNTHESIS BI: CPT

## 2022-09-01 ENCOUNTER — TRANSCRIBE ORDER (OUTPATIENT)
Dept: SCHEDULING | Age: 67
End: 2022-09-01

## 2022-09-01 DIAGNOSIS — M48.062 SPINAL STENOSIS, LUMBAR REGION, WITH NEUROGENIC CLAUDICATION: Primary | ICD-10-CM

## 2022-09-13 ENCOUNTER — TELEPHONE (OUTPATIENT)
Dept: PHYSICAL THERAPY | Age: 67
End: 2022-09-13

## 2022-09-14 ENCOUNTER — HOSPITAL ENCOUNTER (OUTPATIENT)
Age: 67
Discharge: HOME | End: 2022-09-14
Attending: PHYSICIAN ASSISTANT
Payer: MEDICARE

## 2022-09-14 DIAGNOSIS — M48.062 SPINAL STENOSIS, LUMBAR REGION, WITH NEUROGENIC CLAUDICATION: ICD-10-CM

## 2022-09-14 PROCEDURE — 72148 MRI LUMBAR SPINE W/O DYE: CPT

## 2022-09-19 ENCOUNTER — TELEPHONE (OUTPATIENT)
Dept: PHYSICAL THERAPY | Age: 67
End: 2022-09-19

## 2022-09-19 NOTE — PROGRESS NOTES
[facility] - [place] [address]  [address], [address] [phone] fax    Discharge Summary  Patient name: Una Varma Start of Care: 2022   Referral source: Tonny Mazariegos MD : 1955                Medical Diagnosis: Pain in left elbow [M25.522]  Medial epicondylitis, left elbow [M77.02]  Payor: Rozina Sambazonantonietta / Plan: [employer] / Product Type: Managed Care Medicare /  Onset Date:22                Treatment Diagnosis: left elbow pain, neck pain   Prior Hospitalization: see medical history Provider#: [id number]   Medications: Verified on Patient summary List    Comorbidities: cervical disc herniations, OA, DM, HTN, hx of trigger finger surgeries,    Prior Level of Function: functionally (I) right hand dominant; retired  Visits from Start of Care: 9                                              Missed Visits: 0    Reporting Period : 2022 to 2022    1. Pt will demonstrate left shoulder AROM flexion to 150degrees  in order to improve ability to reach overhead  Re-certification: 377 deg  DC: unable to reassess due to recent discharge   2. Pt will score at least predicted value on FOTO in order to facilitate return to PLOF. Re-certification: 50 points  DC: unable to reassess due to recent discharge   3. Pt will report having minimal to no increased pain with gripping and picking up a 10lb kettlebell to improve ease of household chores. Re-certification: increased pain and difficulty with picking up and gripping objects (10-15lbs). DC: unable to reassess due to recent discharge   4. Pt will report an improvement in at worst pain to 5/10 to improve her ability to fish.   Re-certification: 8/ at worst  DC: unable to reassess due to recent discharge     Assessment/Summary of care:     Pt self discharged via telephone and was advised she can always return with a new referral.     RECOMMENDATIONS:  [x]Discontinue therapy: []Patient has reached or is progressing toward set goals      [x]Patient is non-compliant or has abdicated      []Due to lack of appreciable progress towards set goals    Carmela Colon, PT 9/19/2022 12:37 PM

## 2022-09-27 ENCOUNTER — HOSPITAL ENCOUNTER (OUTPATIENT)
Age: 67
Discharge: HOME | End: 2022-09-27
Attending: PHYSICIAN ASSISTANT
Payer: MEDICARE

## 2022-09-27 DIAGNOSIS — M50.323 OTHER CERVICAL DISC DEGENERATION AT C6-C7 LEVEL: ICD-10-CM

## 2022-09-27 PROCEDURE — 72141 MRI NECK SPINE W/O DYE: CPT

## 2022-10-12 ENCOUNTER — OFFICE VISIT (OUTPATIENT)
Dept: ORTHOPEDIC SURGERY | Age: 67
End: 2022-10-12
Payer: MEDICARE

## 2022-10-12 VITALS — HEIGHT: 66 IN | TEMPERATURE: 97.3 F | BODY MASS INDEX: 37.16 KG/M2 | WEIGHT: 231.2 LBS

## 2022-10-12 DIAGNOSIS — M18.12 ARTHRITIS OF CARPOMETACARPAL (CMC) JOINT OF LEFT THUMB: ICD-10-CM

## 2022-10-12 DIAGNOSIS — M65.341 TRIGGER RING FINGER OF RIGHT HAND: Primary | ICD-10-CM

## 2022-10-12 DIAGNOSIS — M19.041 PRIMARY OSTEOARTHRITIS OF RIGHT HAND: ICD-10-CM

## 2022-10-12 PROCEDURE — 1101F PT FALLS ASSESS-DOCD LE1/YR: CPT | Performed by: ORTHOPAEDIC SURGERY

## 2022-10-12 PROCEDURE — G8432 DEP SCR NOT DOC, RNG: HCPCS | Performed by: ORTHOPAEDIC SURGERY

## 2022-10-12 PROCEDURE — G8427 DOCREV CUR MEDS BY ELIG CLIN: HCPCS | Performed by: ORTHOPAEDIC SURGERY

## 2022-10-12 PROCEDURE — G9899 SCRN MAM PERF RSLTS DOC: HCPCS | Performed by: ORTHOPAEDIC SURGERY

## 2022-10-12 PROCEDURE — G8536 NO DOC ELDER MAL SCRN: HCPCS | Performed by: ORTHOPAEDIC SURGERY

## 2022-10-12 PROCEDURE — 20605 DRAIN/INJ JOINT/BURSA W/O US: CPT | Performed by: ORTHOPAEDIC SURGERY

## 2022-10-12 PROCEDURE — 1090F PRES/ABSN URINE INCON ASSESS: CPT | Performed by: ORTHOPAEDIC SURGERY

## 2022-10-12 PROCEDURE — 73130 X-RAY EXAM OF HAND: CPT | Performed by: ORTHOPAEDIC SURGERY

## 2022-10-12 PROCEDURE — 99203 OFFICE O/P NEW LOW 30 MIN: CPT | Performed by: ORTHOPAEDIC SURGERY

## 2022-10-12 PROCEDURE — G8400 PT W/DXA NO RESULTS DOC: HCPCS | Performed by: ORTHOPAEDIC SURGERY

## 2022-10-12 PROCEDURE — 1123F ACP DISCUSS/DSCN MKR DOCD: CPT | Performed by: ORTHOPAEDIC SURGERY

## 2022-10-12 PROCEDURE — 3017F COLORECTAL CA SCREEN DOC REV: CPT | Performed by: ORTHOPAEDIC SURGERY

## 2022-10-12 PROCEDURE — G8417 CALC BMI ABV UP PARAM F/U: HCPCS | Performed by: ORTHOPAEDIC SURGERY

## 2022-10-12 PROCEDURE — 20550 NJX 1 TENDON SHEATH/LIGAMENT: CPT | Performed by: ORTHOPAEDIC SURGERY

## 2022-10-12 NOTE — PROGRESS NOTES
Richard Farfan is a 74 y.o. female right handed retiree. Worker's Compensation and legal considerations: none filed. Vitals:    10/12/22 0851   Temp: 97.3 °F (36.3 °C)   TempSrc: Temporal   Weight: 231 lb 3.2 oz (104.9 kg)   Height: 5' 6\" (1.676 m)   PainSc:   7   PainLoc: Hand           Chief Complaint   Patient presents with    Hand Pain     Bilat          HPI: Patient presents today with complaints of right ring finger pain and locking as well as right hand pain and left thumb base pain.     Date of onset: Chronic    Injury: No    Prior Treatment:  Yes: Comment: Right ring finger and left thumb base injections in the past    Numbness/ Tingling: No      ROS: Review of Systems - General ROS: negative  Psychological ROS: negative  ENT ROS: negative  Allergy and Immunology ROS: negative  Hematological and Lymphatic ROS: negative  Respiratory ROS: no cough, shortness of breath, or wheezing  Cardiovascular ROS: no chest pain or dyspnea on exertion  Gastrointestinal ROS: no abdominal pain, change in bowel habits, or black or bloody stools  Musculoskeletal ROS: negative  Neurological ROS: negative  Dermatological ROS: negative    Past Medical History:   Diagnosis Date    Cataract     Chronic pain     lower back pain    Diabetes ([address]     Ill-defined condition     dvt    Insomnia     Musculoskeletal disorder     herniated disk; bilat feet; left thumb;     Neuropathy        Past Surgical History:   Procedure Laterality Date    COLONOSCOPY N/A 3/13/2017    COLONOSCOPY polypectomy performed by Walker Gomez MD at [facility] ENDOSCOPY    HX CATARACT REMOVAL Left 2016    HX  SECTION      HX CHOLECYSTECTOMY      HX HYSTERECTOMY      HX ORTHOPAEDIC  20+    left thumb    HX ORTHOPAEDIC      left hand index/middle/ringer finger    HX ROTATOR CUFF REPAIR Left     [place] TEMPORAL ARTERY LIGATN OR BX Bilateral 2016    Dr. Milagros Albarado       Current Outpatient Medications   Medication Sig Dispense Refill SITagliptin-metFORMIN (Janumet) 50-1,000 mg per tablet Take 1 Tab by mouth two (2) times daily (with meals). cyclobenzaprine (FLEXERIL) 10 mg tablet Take 10 mg by mouth three (3) times daily as needed for Muscle Spasm(s). oxyCODONE IR 5 mg tab 10 mg, acetaminophen 325 mg tab 325 mg Take  by mouth every four (4) hours as needed. atorvastatin (LIPITOR) 40 mg tablet Take 1 Tab by mouth daily. 30 Tab 6    omeprazole (PRILOSEC) 40 mg capsule Take 40 mg by mouth daily. polyethylene glycol (MIRALAX) 17 gram/dose powder Take 17 g by mouth daily. 1 tablespoon with 8 oz of water daily 289 g 0    losartan (COZAAR) 25 mg tablet TK 1 T PO D  3    aspirin delayed-release 81 mg tablet Take  by mouth daily. FOLIC ACID/MULTIVIT-MIN/LUTEIN (CENTRUM SILVER PO) Take  by mouth daily. OTHER DDS Lumbar Traction Belt  Use as directed 1 Device prn    glipiZIDE (GLUCOTROL) 10 mg tablet Take 10 mg by mouth two (2) times a day. Allergies   Allergen Reactions    Pcn [Penicillins] Rash           PE:     Physical Exam  Vitals and nursing note reviewed. Constitutional:       General: She is not in acute distress. Appearance: Normal appearance. She is not ill-appearing. Cardiovascular:      Pulses: Normal pulses. Pulmonary:      Effort: Pulmonary effort is normal. No respiratory distress. Musculoskeletal:         General: Tenderness present. No swelling, deformity or signs of injury. Normal range of motion. Cervical back: Normal range of motion and neck supple. Right lower leg: No edema. Left lower leg: No edema. Skin:     General: Skin is warm and dry. Capillary Refill: Capillary refill takes less than 2 seconds. Findings: No bruising or erythema. Neurological:      General: No focal deficit present. Mental Status: She is alert and oriented to person, place, and time.    Psychiatric:         Mood and Affect: Mood normal.         Behavior: Behavior normal. Hand:    Examination L Digit(s) R Digit(s)   1st CMC Tenderness +  -    1st CMC Grind +  -    Blaise Nodes -  -    Heberden Nodes -  -    A1 Pulley Tenderness -  + RF   Triggering -  +    UCL Instability -  -    RCL Instability -  -    Lateral Stress Pain -  -    Palmar Cords -  -    Tabletop test -  -    Garrod's Pads -  -     Strength       Pinch Strength         ROM: Full        Imaging:     10/12/2022 3 views of bilateral hands significant for mild degenerative changes throughout the small joints. Most notably there is left thumb CMC degenerative changes consistent with Oscar Heir stage II-III. ICD-10-CM ICD-9-CM    1. Trigger ring finger of right hand  M65.341 727.03 INJECT TENDON SHEATH/LIGAMENT      triamcinolone acetonide (KENALOG) 10 mg/mL injection 10 mg      2. Arthritis of carpometacarpal (CMC) joint of left thumb  M18.12 716.94 AMB POC XRAY, HAND; 3+ VIEWS      DRAIN/INJECT SMALL JOINT/BURSA      triamcinolone acetonide (KENALOG) 10 mg/mL injection 10 mg      AMB SUPPLY ORDER      3. Primary osteoarthritis of right hand  M19.041 715.14 AMB POC XRAY, HAND; 3+ VIEWS            Plan:     Right ring trigger finger injection and left thumb CMC joint injection. Patient also supplied with a left thumb CMC brace today. Follow-up and Dispositions    Return if symptoms worsen or fail to improve.           Plan was reviewed with patient, who verbalized agreement and understanding of the plan    [address] NOTE        Chart reviewed for the following:   IJd DO, have reviewed the History, Physical and updated the [facility] reactions [facility] performed immediately prior to start of procedure:   Kaylynn MALONEY DO, have performed the following reviews on Sameera Fritz prior to the start of the procedure:            * Patient was identified by name and date of birth   * Agreement on procedure being performed was verified  * Risks and Benefits explained to the patient  * Procedure site verified and marked as necessary  * Patient was positioned for comfort  * Consent was signed and verified     Time: 09:26 AM      Date of procedure: 10/12/2022    Procedure performed by: Dania Irving DO    Provider assisted by: Spencer Dan MA    Patient assisted by: self    How tolerated by patient: tolerated the procedure well with no complications    Post Procedural Pain Scale: 0 - No Hurt    Comments: none    Procedure:  After consent was obtained, using sterile technique the right ring finger and left thumb was prepped. Local anesthetic used: 1% lidocaine. Kenalog 5 mg X2 and was then injected and the needle withdrawn. The procedure was well tolerated. The patient is asked to continue to rest the area for a few more days before resuming regular activities. It may be more painful for the first 1-2 days. Watch for fever, or increased swelling or persistent pain in the joint. Call or return to clinic prn if such symptoms occur or there is failure to improve as anticipated.

## 2022-11-09 ENCOUNTER — HOSPITAL ENCOUNTER (EMERGENCY)
Age: 67
Discharge: HOME | End: 2022-11-09
Attending: EMERGENCY MEDICINE
Payer: MEDICARE

## 2022-11-09 VITALS
HEART RATE: 95 BPM | SYSTOLIC BLOOD PRESSURE: 138 MMHG | RESPIRATION RATE: 16 BRPM | BODY MASS INDEX: 36.64 KG/M2 | DIASTOLIC BLOOD PRESSURE: 89 MMHG | WEIGHT: 227 LBS | TEMPERATURE: 98.9 F | OXYGEN SATURATION: 98 %

## 2022-11-09 DIAGNOSIS — L02.415 CUTANEOUS ABSCESS OF RIGHT LOWER EXTREMITY: Primary | ICD-10-CM

## 2022-11-09 PROCEDURE — 74011250637 HC RX REV CODE- 250/637: Performed by: EMERGENCY MEDICINE

## 2022-11-09 PROCEDURE — 99283 EMERGENCY DEPT VISIT LOW MDM: CPT

## 2022-11-09 PROCEDURE — 75810000289 HC I&D ABSCESS SIMP/COMP/MULT

## 2022-11-09 RX ORDER — SULFAMETHOXAZOLE AND TRIMETHOPRIM 800; 160 MG/1; MG/1
2 TABLET ORAL
Status: COMPLETED | OUTPATIENT
Start: 2022-11-09 | End: 2022-11-09

## 2022-11-09 RX ORDER — SULFAMETHOXAZOLE AND TRIMETHOPRIM 800; 160 MG/1; MG/1
2 TABLET ORAL 2 TIMES DAILY
Qty: 28 TABLET | Refills: 0 | Status: CANCELLED | OUTPATIENT
Start: 2022-11-09 | End: 2022-11-16

## 2022-11-09 RX ORDER — SULFAMETHOXAZOLE AND TRIMETHOPRIM 800; 160 MG/1; MG/1
2 TABLET ORAL 2 TIMES DAILY
Qty: 28 TABLET | Refills: 0 | Status: SHIPPED | OUTPATIENT
Start: 2022-11-09 | End: 2022-11-16

## 2022-11-09 RX ORDER — LIDOCAINE HYDROCHLORIDE 20 MG/ML
0.3 INJECTION, SOLUTION INFILTRATION; PERINEURAL ONCE
Status: DISCONTINUED | OUTPATIENT
Start: 2022-11-09 | End: 2022-11-09 | Stop reason: HOSPADM

## 2022-11-09 RX ADMIN — SULFAMETHOXAZOLE AND TRIMETHOPRIM 2 TABLET: 800; 160 TABLET ORAL at 07:06

## 2022-11-09 NOTE — Clinical Note
[address] [place] [address] [facility] EMERGENCY DEPT  [id number] [address] [phone]    Work/School Note    Date: 11/9/2022    To Whom It May concern:      Destinee Justin was seen and treated today in the emergency room by the following provider(s):  Attending Provider: María Elena Burt MD.      Destinee Justin is excused from work/school on 11/09/22. She is clear to return to work/school on 11/10/22.         Sincerely,          Lindsay Evans MD

## 2022-11-09 NOTE — DISCHARGE INSTRUCTIONS
Change your dressing daily, be sure to irrigate the wound daily with warm water, take full course of antibiotics.

## 2022-11-09 NOTE — ED PROVIDER NOTES
EMERGENCY DEPARTMENT HISTORY AND PHYSICAL EXAM    Date: 11/9/2022  Patient Name: Abraham De La Cruz    History of Presenting Illness     Chief Complaint   Patient presents with    Skin Problem         History Provided By:     Chief Complaint:  Duration:   Timing:    Location:   Quality:   Severity:   Modifying Factors:   Associated Symptoms: none       Additional History (Context): Abraham De La Cruz is a 74 y.o. female with a history of diabetes who presents emerged part with complaints of abscess to her right upper, inner thigh. She states that she noticed the bump a few days ago and has gotten progressively bigger. She has not had any fevers or chills, denies any nausea or vomiting. She states that there has not been any drainage from the area. She states that she has a history of abscesses in the past.      PCP: Geovanna Duncan NP    Current Facility-Administered Medications   Medication Dose Route Frequency Provider Last Rate Last Admin    lidocaine (XYLOCAINE) 20 mg/mL (2 %) injection 6 mg  0.3 mL IntraDERMal Raisa Mcknight MD         Current Outpatient Medications   Medication Sig Dispense Refill    trimethoprim-sulfamethoxazole (Bactrim DS) 160-800 mg per tablet Take 2 Tablets by mouth two (2) times a day for 7 days. 28 Tablet 0    SITagliptin-metFORMIN (Janumet) 50-1,000 mg per tablet Take 1 Tab by mouth two (2) times daily (with meals). cyclobenzaprine (FLEXERIL) 10 mg tablet Take 10 mg by mouth three (3) times daily as needed for Muscle Spasm(s). oxyCODONE IR 5 mg tab 10 mg, acetaminophen 325 mg tab 325 mg Take  by mouth every four (4) hours as needed. atorvastatin (LIPITOR) 40 mg tablet Take 1 Tab by mouth daily. 30 Tab 6    omeprazole (PRILOSEC) 40 mg capsule Take 40 mg by mouth daily. polyethylene glycol (MIRALAX) 17 gram/dose powder Take 17 g by mouth daily.  1 tablespoon with 8 oz of water daily 289 g 0    losartan (COZAAR) 25 mg tablet TK 1 T PO D  3    aspirin delayed-release 81 mg tablet Take  by mouth daily. FOLIC ACID/MULTIVIT-MIN/LUTEIN (CENTRUM SILVER PO) Take  by mouth daily. OTHER DDS Lumbar Traction Belt  Use as directed 1 Device prn    glipiZIDE (GLUCOTROL) 10 mg tablet Take 10 mg by mouth two (2) times a day. Past History     Past Medical History:  Past Medical History:   Diagnosis Date    Cataract     Chronic pain     lower back pain    Diabetes ([address]     Ill-defined condition     dvt    Insomnia     Musculoskeletal disorder     herniated disk; bilat feet; left thumb;     Neuropathy        Past Surgical History:  Past Surgical History:   Procedure Laterality Date    COLONOSCOPY N/A 3/13/2017    COLONOSCOPY polypectomy performed by Douglas Duron MD at [facility] ENDOSCOPY    HX CATARACT REMOVAL Left 2016    HX  SECTION      HX CHOLECYSTECTOMY      HX HYSTERECTOMY      HX ORTHOPAEDIC  20+    left thumb    HX ORTHOPAEDIC      left hand index/middle/ringer finger    HX ROTATOR CUFF REPAIR Left     [place] TEMPORAL ARTERY LIGATN OR BX Bilateral 2016    Dr. Len Jenkins       Family History:  Family History   Problem Relation Age of Onset    Diabetes Mother     Hypertension Mother     Diabetes Father     Hypertension Father     Diabetes Brother        Social History:  Social History     Tobacco Use    Smoking status: Every Day     Packs/day: 0.50     Types: Cigarettes    Smokeless tobacco: Never   Substance Use Topics    Alcohol use: No     Alcohol/week: 0.0 standard drinks    Drug use: No       Allergies: Allergies   Allergen Reactions    Gabapentin Anaphylaxis    Pcn [Penicillins] Rash         Review of Systems   Review of Systems   Constitutional:  Negative for appetite change, fatigue and fever. HENT:  Negative for congestion, sinus pressure and sore throat. Respiratory:  Negative for apnea, chest tightness and shortness of breath. Cardiovascular:  Negative for chest pain, palpitations and leg swelling.    Gastrointestinal:  Negative for abdominal distention, abdominal pain and diarrhea. Genitourinary:  Negative for dysuria, flank pain and frequency. Skin:  Negative for color change, rash and wound. All other systems reviewed and are negative. All Other Systems Negative  Physical Exam     Vitals:    11/09/22 0555   BP: 138/89   Pulse: 95   Resp: 16   Temp: 98.9 °F (37.2 °C)   SpO2: 98%   Weight: 103 kg (227 lb)     Physical Exam  Vitals and nursing note reviewed. Constitutional:       Appearance: Normal appearance. She is obese. HENT:      Head: Normocephalic and atraumatic. Right Ear: External ear normal.      Left Ear: External ear normal.      Nose: Nose normal.      Mouth/Throat:      Mouth: Mucous membranes are moist.      Pharynx: Oropharynx is clear. Eyes:      Extraocular Movements: Extraocular movements intact. Conjunctiva/sclera: Conjunctivae normal.   Cardiovascular:      Rate and Rhythm: Normal rate and regular rhythm. Pulses: Normal pulses. Heart sounds: Normal heart sounds. Pulmonary:      Effort: Pulmonary effort is normal.      Breath sounds: Normal breath sounds. Abdominal:      General: Bowel sounds are normal.      Palpations: Abdomen is soft. Genitourinary:     General: Normal vulva. Musculoskeletal:         General: Normal range of motion. Skin:     General: Skin is warm and dry. Comments: Patient has a 3 cm x 3 cm abscess to the right gluteal fold that appears fluctuant. Bedside ultrasound confirms a fluid pocket in this area. There is no surrounding erythema. The area is tender to palpation. There is no crepitus. Neurological:      General: No focal deficit present. Mental Status: She is alert and oriented to person, place, and time. Mental status is at baseline. Psychiatric:         Mood and Affect: Mood normal.         Behavior: Behavior normal.         Thought Content:  Thought content normal.         Judgment: Judgment normal.         Diagnostic Study Results     Labs - No results found for this or any previous visit (from the past 12 hour(s)). Radiologic Studies -   No orders to display     CT Results  (Last 48 hours)      None          CXR Results  (Last 48 hours)      None              Medical Decision Making   I am the first provider for this patient. I reviewed the vital signs, available nursing notes, past medical history, past surgical history, family history and social history. Vital Signs-Reviewed the patient's vital signs. Records Reviewed: Nursing Notes and Old Medical Records     Procedures: None   I&D Abcess Simple    Date/Time: 11/9/2022 8:08 AM  Performed by: Onnie Castleman, MD  Authorized by: Onnie Castleman, MD     Consent:     Consent obtained:  Verbal    Consent given by:  Patient    Risks, benefits, and alternatives were discussed: yes      Risks discussed:  Bleeding, incomplete drainage, infection and pain    Alternatives discussed:  No treatment  Universal protocol:     Procedure explained and questions answered to patient or proxy's satisfaction: yes      Patient identity confirmed:  Verbally with patient  Location:     Type:  Abscess    Size:  [id number]    Location:  Anogenital  Pre-procedure details:     Skin preparation:  Antiseptic wash  Sedation:     Sedation type:  None  Anesthesia:     Anesthesia method:  Local infiltration  Procedure type:     Complexity:  Simple  Procedure details:     Ultrasound guidance: yes      Needle aspiration: no      Incision types:  Cruciate    Incision depth:  Subcutaneous    Wound management:  Probed and deloculated    Drainage:  Purulent    Drainage amount:   Moderate    Wound treatment:  Wound left open    Packing materials:  None  Post-procedure details:     Procedure completion:  Tolerated well, no immediate complications    Provider Notes (Medical Decision Making):                MED RECONCILIATION:  Current Facility-Administered Medications   Medication Dose Route Frequency    lidocaine (XYLOCAINE) 20 mg/mL (2 %) injection 6 mg  0.3 mL IntraDERMal ONCE     Current Outpatient Medications   Medication Sig    trimethoprim-sulfamethoxazole (Bactrim DS) 160-800 mg per tablet Take 2 Tablets by mouth two (2) times a day for 7 days. SITagliptin-metFORMIN (Janumet) 50-1,000 mg per tablet Take 1 Tab by mouth two (2) times daily (with meals). cyclobenzaprine (FLEXERIL) 10 mg tablet Take 10 mg by mouth three (3) times daily as needed for Muscle Spasm(s). oxyCODONE IR 5 mg tab 10 mg, acetaminophen 325 mg tab 325 mg Take  by mouth every four (4) hours as needed. atorvastatin (LIPITOR) 40 mg tablet Take 1 Tab by mouth daily. omeprazole (PRILOSEC) 40 mg capsule Take 40 mg by mouth daily. polyethylene glycol (MIRALAX) 17 gram/dose powder Take 17 g by mouth daily. 1 tablespoon with 8 oz of water daily    losartan (COZAAR) 25 mg tablet TK 1 T PO D    aspirin delayed-release 81 mg tablet Take  by mouth daily. FOLIC ACID/MULTIVIT-MIN/LUTEIN (CENTRUM SILVER PO) Take  by mouth daily. OTHER DDS Lumbar Traction Belt  Use as directed    glipiZIDE (GLUCOTROL) 10 mg tablet Take 10 mg by mouth two (2) times a day. Disposition:  Home     DISCHARGE NOTE:   Pt has been reexamined. Patient has no new complaints, changes, or physical findings. Care plan outlined and precautions discussed. Results of workup were reviewed with the patient. All medications were reviewed with the patient. All of pt's questions and concerns were addressed. Patient was instructed and agrees to follow up with PCP as well as to return to the ED upon further deterioration. Patient is ready to go home.     Follow-up Information       Follow up With Specialties Details Why [address], NP Nurse Practitioner In 3 days For wound re-check Faisal [address]  [phone]              Current Discharge Medication List        START taking these medications    Details   trimethoprim-sulfamethoxazole (Bactrim DS) 160-800 mg per tablet Take 2 Tablets by mouth two (2) times a day for 7 days. Qty: 28 Tablet, Refills: 0  Start date: 11/9/2022, End date: 11/16/2022                 Diagnosis     Clinical Impression:   1. Cutaneous abscess of right lower extremity          \"Please note that this dictation was completed with [email or web address], the computer voice recognition software. Quite often unanticipated grammatical, syntax, homophones, and other interpretive errors are inadvertently transcribed by the computer software. Please disregard these errors. Please excuse any errors that have escaped final proofreading. \"

## 2023-03-10 RX ORDER — TIZANIDINE 4 MG/1
4 TABLET ORAL EVERY 6 HOURS PRN
COMMUNITY

## 2023-03-10 RX ORDER — OXYCODONE AND ACETAMINOPHEN 10; 325 MG/1; MG/1
1 TABLET ORAL 3 TIMES DAILY
COMMUNITY

## 2023-03-10 RX ORDER — HYDROXYZINE HYDROCHLORIDE 25 MG/1
25 TABLET, FILM COATED ORAL EVERY 8 HOURS PRN
COMMUNITY

## 2023-03-10 RX ORDER — TRAMADOL HYDROCHLORIDE 100 MG/1
100 TABLET, EXTENDED RELEASE ORAL DAILY
COMMUNITY

## 2023-03-10 RX ORDER — IBUPROFEN 800 MG/1
800 TABLET ORAL EVERY 6 HOURS PRN
COMMUNITY

## 2023-04-22 ENCOUNTER — HOSPITAL ENCOUNTER (OUTPATIENT)
Facility: HOSPITAL | Age: 68
Discharge: HOME | End: 2023-04-25
Payer: MEDICARE

## 2023-04-22 LAB
25(OH)D3 SERPL-MCNC: 29.7 NG/ML (ref 30–100)
ALBUMIN SERPL-MCNC: 4 G/DL (ref 3.4–5)
ALBUMIN/GLOB SERPL: 1 (ref 0.8–1.7)
ALP SERPL-CCNC: 182 U/L (ref 45–117)
ALT SERPL-CCNC: 26 U/L (ref 13–56)
ANION GAP SERPL CALC-SCNC: 1 MMOL/L (ref 3–18)
AST SERPL-CCNC: 14 U/L (ref 10–38)
BASOPHILS # BLD: 0 K/UL (ref 0–0.1)
BASOPHILS NFR BLD: 0 % (ref 0–2)
BILIRUB SERPL-MCNC: 0.4 MG/DL (ref 0.2–1)
BUN SERPL-MCNC: 14 MG/DL (ref 7–18)
BUN/CREAT SERPL: 17 (ref 12–20)
CALCIUM SERPL-MCNC: 10.7 MG/DL (ref 8.5–10.1)
CHLORIDE SERPL-SCNC: 111 MMOL/L (ref 100–111)
CHOLEST SERPL-MCNC: 187 MG/DL
CO2 SERPL-SCNC: 28 MMOL/L (ref 21–32)
CREAT SERPL-MCNC: 0.82 MG/DL (ref 0.6–1.3)
DIFFERENTIAL METHOD BLD: ABNORMAL
EOSINOPHIL # BLD: 0 K/UL (ref 0–0.4)
EOSINOPHIL NFR BLD: 0 % (ref 0–5)
ERYTHROCYTE [DISTWIDTH] IN BLOOD BY AUTOMATED COUNT: 14.6 % (ref 11.6–14.5)
GLOBULIN SER CALC-MCNC: 4.2 G/DL (ref 2–4)
GLUCOSE SERPL-MCNC: 177 MG/DL (ref 74–99)
HBA1C MFR BLD: 6.5 % (ref 4.2–5.6)
HCT VFR BLD AUTO: 41.5 % (ref 35–45)
HDLC SERPL-MCNC: 55 MG/DL (ref 40–60)
HDLC SERPL: 3.4 (ref 0–5)
HGB BLD-MCNC: 13 G/DL (ref 12–16)
IMM GRANULOCYTES # BLD AUTO: 0 K/UL (ref 0–0.04)
IMM GRANULOCYTES NFR BLD AUTO: 0 % (ref 0–0.5)
LDLC SERPL CALC-MCNC: 96.4 MG/DL (ref 0–100)
LIPID PANEL: ABNORMAL
LYMPHOCYTES # BLD: 3.3 K/UL (ref 0.9–3.6)
LYMPHOCYTES NFR BLD: 35 % (ref 21–52)
MCH RBC QN AUTO: 27.9 PG (ref 24–34)
MCHC RBC AUTO-ENTMCNC: 31.3 G/DL (ref 31–37)
MCV RBC AUTO: 89.1 FL (ref 78–100)
MONOCYTES # BLD: 0.6 K/UL (ref 0.05–1.2)
MONOCYTES NFR BLD: 7 % (ref 3–10)
NEUTS SEG # BLD: 5.4 K/UL (ref 1.8–8)
NEUTS SEG NFR BLD: 58 % (ref 40–73)
NRBC # BLD: 0 K/UL (ref 0–0.01)
NRBC BLD-RTO: 0 PER 100 WBC
PLATELET # BLD AUTO: 310 K/UL (ref 135–420)
PMV BLD AUTO: 11.7 FL (ref 9.2–11.8)
POTASSIUM SERPL-SCNC: 4.7 MMOL/L (ref 3.5–5.5)
PROT SERPL-MCNC: 8.2 G/DL (ref 6.4–8.2)
RBC # BLD AUTO: 4.66 M/UL (ref 4.2–5.3)
SODIUM SERPL-SCNC: 140 MMOL/L (ref 136–145)
TRIGL SERPL-MCNC: 178 MG/DL
TSH SERPL DL<=0.05 MIU/L-ACNC: 1.08 UIU/ML (ref 0.36–3.74)
VLDLC SERPL CALC-MCNC: 35.6 MG/DL
WBC # BLD AUTO: 9.5 K/UL (ref 4.6–13.2)

## 2023-04-22 PROCEDURE — 82306 VITAMIN D 25 HYDROXY: CPT

## 2023-04-22 PROCEDURE — 36415 COLL VENOUS BLD VENIPUNCTURE: CPT

## 2023-04-22 PROCEDURE — 84443 ASSAY THYROID STIM HORMONE: CPT

## 2023-04-22 PROCEDURE — 85025 COMPLETE CBC W/AUTO DIFF WBC: CPT

## 2023-04-22 PROCEDURE — 80061 LIPID PANEL: CPT

## 2023-04-22 PROCEDURE — 83036 HEMOGLOBIN GLYCOSYLATED A1C: CPT

## 2023-04-22 PROCEDURE — 80053 COMPREHEN METABOLIC PANEL: CPT

## 2023-04-27 ENCOUNTER — HOSPITAL ENCOUNTER (EMERGENCY)
Facility: HOSPITAL | Age: 68
Discharge: HOME | End: 2023-04-27
Attending: EMERGENCY MEDICINE
Payer: MEDICARE

## 2023-04-27 ENCOUNTER — APPOINTMENT (OUTPATIENT)
Facility: HOSPITAL | Age: 68
End: 2023-04-27
Payer: MEDICARE

## 2023-04-27 VITALS
TEMPERATURE: 98.2 F | OXYGEN SATURATION: 98 % | RESPIRATION RATE: 17 BRPM | DIASTOLIC BLOOD PRESSURE: 78 MMHG | SYSTOLIC BLOOD PRESSURE: 143 MMHG | HEART RATE: 76 BPM

## 2023-04-27 DIAGNOSIS — R07.9 CHEST PAIN, UNSPECIFIED TYPE: Primary | ICD-10-CM

## 2023-04-27 LAB
ALBUMIN SERPL-MCNC: 3.4 G/DL (ref 3.4–5)
ALBUMIN/GLOB SERPL: 0.8 (ref 0.8–1.7)
ALP SERPL-CCNC: 173 U/L (ref 45–117)
ALT SERPL-CCNC: 19 U/L (ref 13–56)
ANION GAP SERPL CALC-SCNC: 4 MMOL/L (ref 3–18)
AST SERPL-CCNC: 14 U/L (ref 10–38)
BASOPHILS # BLD: 0 K/UL (ref 0–0.1)
BASOPHILS NFR BLD: 0 % (ref 0–2)
BILIRUB SERPL-MCNC: 0.2 MG/DL (ref 0.2–1)
BUN SERPL-MCNC: 11 MG/DL (ref 7–18)
BUN/CREAT SERPL: 12 (ref 12–20)
CALCIUM SERPL-MCNC: 9.8 MG/DL (ref 8.5–10.1)
CHLORIDE SERPL-SCNC: 109 MMOL/L (ref 100–111)
CO2 SERPL-SCNC: 27 MMOL/L (ref 21–32)
CREAT SERPL-MCNC: 0.91 MG/DL (ref 0.6–1.3)
D DIMER PPP FEU-MCNC: 0.9 UG/ML(FEU)
DIFFERENTIAL METHOD BLD: ABNORMAL
EOSINOPHIL # BLD: 0 K/UL (ref 0–0.4)
EOSINOPHIL NFR BLD: 0 % (ref 0–5)
ERYTHROCYTE [DISTWIDTH] IN BLOOD BY AUTOMATED COUNT: 14.3 % (ref 11.6–14.5)
FLUAV RNA SPEC QL NAA+PROBE: NOT DETECTED
FLUBV RNA SPEC QL NAA+PROBE: NOT DETECTED
GLOBULIN SER CALC-MCNC: 4.1 G/DL (ref 2–4)
GLUCOSE SERPL-MCNC: 132 MG/DL (ref 74–99)
HCT VFR BLD AUTO: 36.3 % (ref 35–45)
HGB BLD-MCNC: 11.9 G/DL (ref 12–16)
IMM GRANULOCYTES # BLD AUTO: 0 K/UL (ref 0–0.04)
IMM GRANULOCYTES NFR BLD AUTO: 0 % (ref 0–0.5)
LYMPHOCYTES # BLD: 3.4 K/UL (ref 0.9–3.6)
LYMPHOCYTES NFR BLD: 34 % (ref 21–52)
MCH RBC QN AUTO: 28.4 PG (ref 24–34)
MCHC RBC AUTO-ENTMCNC: 32.8 G/DL (ref 31–37)
MCV RBC AUTO: 86.6 FL (ref 78–100)
MONOCYTES # BLD: 0.7 K/UL (ref 0.05–1.2)
MONOCYTES NFR BLD: 7 % (ref 3–10)
NEUTS SEG # BLD: 5.8 K/UL (ref 1.8–8)
NEUTS SEG NFR BLD: 59 % (ref 40–73)
NRBC # BLD: 0 K/UL (ref 0–0.01)
NRBC BLD-RTO: 0 PER 100 WBC
PLATELET # BLD AUTO: 279 K/UL (ref 135–420)
PMV BLD AUTO: 11.2 FL (ref 9.2–11.8)
POTASSIUM SERPL-SCNC: 4.2 MMOL/L (ref 3.5–5.5)
PROT SERPL-MCNC: 7.5 G/DL (ref 6.4–8.2)
RBC # BLD AUTO: 4.19 M/UL (ref 4.2–5.3)
SARS-COV-2 RNA RESP QL NAA+PROBE: NOT DETECTED
SODIUM SERPL-SCNC: 140 MMOL/L (ref 136–145)
TROPONIN I SERPL HS-MCNC: 4 NG/L (ref 0–54)
WBC # BLD AUTO: 10 K/UL (ref 4.6–13.2)

## 2023-04-27 PROCEDURE — 96375 TX/PRO/DX INJ NEW DRUG ADDON: CPT

## 2023-04-27 PROCEDURE — 84484 ASSAY OF TROPONIN QUANT: CPT

## 2023-04-27 PROCEDURE — 94761 N-INVAS EAR/PLS OXIMETRY MLT: CPT

## 2023-04-27 PROCEDURE — 71045 X-RAY EXAM CHEST 1 VIEW: CPT

## 2023-04-27 PROCEDURE — 80053 COMPREHEN METABOLIC PANEL: CPT

## 2023-04-27 PROCEDURE — 96374 THER/PROPH/DIAG INJ IV PUSH: CPT

## 2023-04-27 PROCEDURE — 6360000004 HC RX CONTRAST MEDICATION: Performed by: EMERGENCY MEDICINE

## 2023-04-27 PROCEDURE — 71275 CT ANGIOGRAPHY CHEST: CPT

## 2023-04-27 PROCEDURE — 6360000002 HC RX W HCPCS: Performed by: EMERGENCY MEDICINE

## 2023-04-27 PROCEDURE — 85379 FIBRIN DEGRADATION QUANT: CPT

## 2023-04-27 PROCEDURE — 87636 SARSCOV2 & INF A&B AMP PRB: CPT

## 2023-04-27 PROCEDURE — 93005 ELECTROCARDIOGRAM TRACING: CPT | Performed by: EMERGENCY MEDICINE

## 2023-04-27 PROCEDURE — 2580000003 HC RX 258: Performed by: EMERGENCY MEDICINE

## 2023-04-27 PROCEDURE — 85025 COMPLETE CBC W/AUTO DIFF WBC: CPT

## 2023-04-27 PROCEDURE — 99285 EMERGENCY DEPT VISIT HI MDM: CPT

## 2023-04-27 RX ORDER — 0.9 % SODIUM CHLORIDE 0.9 %
1000 INTRAVENOUS SOLUTION INTRAVENOUS ONCE
Status: COMPLETED | OUTPATIENT
Start: 2023-04-27 | End: 2023-04-27

## 2023-04-27 RX ORDER — MORPHINE SULFATE 4 MG/ML
4 INJECTION, SOLUTION INTRAMUSCULAR; INTRAVENOUS
Status: COMPLETED | OUTPATIENT
Start: 2023-04-27 | End: 2023-04-27

## 2023-04-27 RX ORDER — KETOROLAC TROMETHAMINE 15 MG/ML
15 INJECTION, SOLUTION INTRAMUSCULAR; INTRAVENOUS ONCE
Status: COMPLETED | OUTPATIENT
Start: 2023-04-27 | End: 2023-04-27

## 2023-04-27 RX ADMIN — IOPAMIDOL 80 ML: 755 INJECTION, SOLUTION INTRAVENOUS at 19:55

## 2023-04-27 RX ADMIN — KETOROLAC TROMETHAMINE 15 MG: 15 INJECTION, SOLUTION INTRAMUSCULAR; INTRAVENOUS at 17:29

## 2023-04-27 RX ADMIN — SODIUM CHLORIDE 1000 ML: 9 INJECTION, SOLUTION INTRAVENOUS at 17:20

## 2023-04-27 RX ADMIN — MORPHINE SULFATE 4 MG: 4 INJECTION, SOLUTION INTRAMUSCULAR; INTRAVENOUS at 17:29

## 2023-04-27 ASSESSMENT — ENCOUNTER SYMPTOMS
COUGH: 0
SHORTNESS OF BREATH: 1

## 2023-04-27 ASSESSMENT — PAIN SCALES - GENERAL
PAINLEVEL_OUTOF10: 2
PAINLEVEL_OUTOF10: 8

## 2023-04-27 NOTE — ED NOTES
Care of patient endorsed to oncoming night nurse Solange Wilson.        Erlin Mendoza RN  04/27/23 1919 Assessment indicated r/t consult requested by MD for assessment, education

## 2023-04-27 NOTE — ED NOTES
Patient is laying comfortable after medication. Vitals are stable awaiting CT scan.        Maciel Cavazos RN  04/27/23 1888

## 2023-04-28 NOTE — ED PROVIDER NOTES
ED continued care note    9:03 PM EDT  Signed out to me by Dr. Lazara Perez, patient with chest pain shortness of breath for 36 hours, tenderness to her midsternal chest.  Negative troponin, chest x-ray with no acute findings. Pending CTA which came back as negative for any acute process including PE. Saw and evaluated the patient having midsternal discomfort with movement and with coughing. Peers benign at rest.  Unchanged over the last 36 hours. 1 troponin sufficient, her heart score is 3, 2 points for age one-point for diabetes. Clinical Impression:   1.  Chest pain, unspecified type         Rochelle Darling MD  04/27/23 8454
MG delayed release capsule Take 40 mg by mouth daily      polyethylene glycol (GLYCOLAX) 17 GM/SCOOP powder Take 17 g by mouth daily      sitaGLIPtan-metFORMIN (JANUMET)  MG per tablet Take 1 tablet by mouth daily         Past History     Past Medical History:  Past Medical History:   Diagnosis Date    Cataract     Chronic pain     lower back pain    Diabetes ([address]     Insomnia     Musculoskeletal disorder     herniated disk; bilat feet; left thumb;     Neuropathy     legs    Neuropathy     Thromboembolus (HCC)     Left Leg       Past Surgical History:  Past Surgical History:   Procedure Laterality Date    CATARACT REMOVAL Left 2016     SECTION      CHOLECYSTECTOMY      COLONOSCOPY N/A 2017    COLONOSCOPY polypectomy performed by Jeanine Franco MD at  [facility] ([facility] STATUS UNKNOWN)      ORTHOPEDIC SURGERY Left     Removal Bone Left Foot    ORTHOPEDIC SURGERY      left hand index/middle/ringer finger    ORTHOPEDIC SURGERY  20+    left thumb    ROTATOR CUFF REPAIR Left     TEMPORAL ARTERY LIGATN OR BX Bilateral 2016    Dr. Jayne Samayoa       Family History:  Family History   Problem Relation Age of Onset    Diabetes Mother     Hypertension Mother     Diabetes Father     Hypertension Father     Diabetes Brother        Social History:  Social History     Tobacco Use    Smoking status: Every Day     Packs/day: 0.25     Types: Cigarettes    Smokeless tobacco: Never   Vaping Use    Vaping Use: Never used   Substance Use Topics    Alcohol use: No     Alcohol/week: 0.0 standard drinks    Drug use: Never       Allergies: Allergies   Allergen Reactions    Gabapentin Anaphylaxis    Penicillins Rash         Review of Systems       Review of Systems   Constitutional:  Negative for chills, fatigue and fever. HENT:  Negative for congestion and drooling. Respiratory:  Positive for shortness of breath. Negative for cough. Cardiovascular:  Positive for chest pain.  Negative

## 2023-04-28 NOTE — ED NOTES
Provider explained discharge instructions, no pain or distress at this time. Patient verbalized understanding of discharge instructions.  IV removed, VS updated and WNL     Joseline Kline RN  04/27/23 2550

## 2023-04-29 LAB
EKG ATRIAL RATE: 84 BPM
EKG DIAGNOSIS: NORMAL
EKG P AXIS: 64 DEGREES
EKG P-R INTERVAL: 154 MS
EKG Q-T INTERVAL: 392 MS
EKG QRS DURATION: 96 MS
EKG QTC CALCULATION (BAZETT): 463 MS
EKG R AXIS: 76 DEGREES
EKG T AXIS: 59 DEGREES
EKG VENTRICULAR RATE: 84 BPM

## 2023-04-29 PROCEDURE — 93010 ELECTROCARDIOGRAM REPORT: CPT | Performed by: INTERNAL MEDICINE

## 2023-05-18 ENCOUNTER — OFFICE VISIT (OUTPATIENT)
Age: 68
End: 2023-05-18

## 2023-05-18 VITALS — WEIGHT: 229 LBS | HEIGHT: 67 IN | BODY MASS INDEX: 35.94 KG/M2

## 2023-05-18 DIAGNOSIS — M65.321 TRIGGER INDEX FINGER OF RIGHT HAND: ICD-10-CM

## 2023-05-18 DIAGNOSIS — M65.341 TRIGGER RING FINGER OF RIGHT HAND: Primary | ICD-10-CM

## 2023-05-18 DIAGNOSIS — M19.041 ARTHRITIS OF RIGHT HAND: ICD-10-CM

## 2023-05-18 NOTE — PROGRESS NOTES
Procedure site verified and marked as necessary  * Patient was positioned for comfort  * Consent was signed and verified     Time: 10:43 AM      Date of procedure: 5/18/2023    Procedure performed by: Norina Fabry, DO    Provider assisted by: Yulissa Sierra MA    Patient assisted by: self    How tolerated by patient: tolerated    Post Procedural Pain Scale:0    Comments: none    Procedure:  After consent was obtained, using sterile technique the right index finger and ring finger was prepped. Local anesthetic used: 1% Lidocaine Kenalog 5 mg x2 and was then injected and the needle withdrawn. The procedure was well tolerated. The patient is asked to continue to rest the area for a few more days before resuming regular activities. It may be more painful for the first 1-2 days. Watch for fever, or increased swelling or persistent pain in the joint. Call or return to clinic prn if such symptoms occur or there is failure to improve as anticipated. Note: This note was completed using voice recognition software.   Any typographical/name errors or mistakes are unintentional.

## 2023-06-07 ENCOUNTER — ANESTHESIA EVENT (OUTPATIENT)
Facility: HOSPITAL | Age: 68
End: 2023-06-07
Payer: MEDICARE

## 2023-06-08 ENCOUNTER — ANESTHESIA (OUTPATIENT)
Facility: HOSPITAL | Age: 68
End: 2023-06-08
Payer: MEDICARE

## 2023-06-08 ENCOUNTER — HOSPITAL ENCOUNTER (OUTPATIENT)
Facility: HOSPITAL | Age: 68
Setting detail: OUTPATIENT SURGERY
Discharge: HOME | End: 2023-06-08
Attending: STUDENT IN AN ORGANIZED HEALTH CARE EDUCATION/TRAINING PROGRAM | Admitting: STUDENT IN AN ORGANIZED HEALTH CARE EDUCATION/TRAINING PROGRAM
Payer: MEDICARE

## 2023-06-08 VITALS
WEIGHT: 223.8 LBS | OXYGEN SATURATION: 99 % | RESPIRATION RATE: 19 BRPM | DIASTOLIC BLOOD PRESSURE: 48 MMHG | HEART RATE: 92 BPM | BODY MASS INDEX: 35.12 KG/M2 | HEIGHT: 67 IN | TEMPERATURE: 97.7 F | SYSTOLIC BLOOD PRESSURE: 134 MMHG

## 2023-06-08 LAB — GLUCOSE BLD STRIP.AUTO-MCNC: 156 MG/DL (ref 70–110)

## 2023-06-08 PROCEDURE — 3700000001 HC ADD 15 MINUTES (ANESTHESIA): Performed by: STUDENT IN AN ORGANIZED HEALTH CARE EDUCATION/TRAINING PROGRAM

## 2023-06-08 PROCEDURE — 7100000000 HC PACU RECOVERY - FIRST 15 MIN: Performed by: STUDENT IN AN ORGANIZED HEALTH CARE EDUCATION/TRAINING PROGRAM

## 2023-06-08 PROCEDURE — 2580000003 HC RX 258: Performed by: NURSE ANESTHETIST, CERTIFIED REGISTERED

## 2023-06-08 PROCEDURE — 6360000002 HC RX W HCPCS: Performed by: STUDENT IN AN ORGANIZED HEALTH CARE EDUCATION/TRAINING PROGRAM

## 2023-06-08 PROCEDURE — 7100000010 HC PHASE II RECOVERY - FIRST 15 MIN: Performed by: STUDENT IN AN ORGANIZED HEALTH CARE EDUCATION/TRAINING PROGRAM

## 2023-06-08 PROCEDURE — 3700000000 HC ANESTHESIA ATTENDED CARE: Performed by: STUDENT IN AN ORGANIZED HEALTH CARE EDUCATION/TRAINING PROGRAM

## 2023-06-08 PROCEDURE — 7100000011 HC PHASE II RECOVERY - ADDTL 15 MIN: Performed by: STUDENT IN AN ORGANIZED HEALTH CARE EDUCATION/TRAINING PROGRAM

## 2023-06-08 PROCEDURE — 82962 GLUCOSE BLOOD TEST: CPT

## 2023-06-08 PROCEDURE — 3600007502: Performed by: STUDENT IN AN ORGANIZED HEALTH CARE EDUCATION/TRAINING PROGRAM

## 2023-06-08 PROCEDURE — C1889 IMPLANT/INSERT DEVICE, NOC: HCPCS | Performed by: STUDENT IN AN ORGANIZED HEALTH CARE EDUCATION/TRAINING PROGRAM

## 2023-06-08 PROCEDURE — 2709999900 HC NON-CHARGEABLE SUPPLY: Performed by: STUDENT IN AN ORGANIZED HEALTH CARE EDUCATION/TRAINING PROGRAM

## 2023-06-08 PROCEDURE — 88305 TISSUE EXAM BY PATHOLOGIST: CPT

## 2023-06-08 PROCEDURE — C1713 ANCHOR/SCREW BN/BN,TIS/BN: HCPCS | Performed by: STUDENT IN AN ORGANIZED HEALTH CARE EDUCATION/TRAINING PROGRAM

## 2023-06-08 PROCEDURE — 3600007512: Performed by: STUDENT IN AN ORGANIZED HEALTH CARE EDUCATION/TRAINING PROGRAM

## 2023-06-08 RX ORDER — DEXTROSE MONOHYDRATE 100 MG/ML
INJECTION, SOLUTION INTRAVENOUS CONTINUOUS PRN
Status: DISCONTINUED | OUTPATIENT
Start: 2023-06-08 | End: 2023-06-08 | Stop reason: HOSPADM

## 2023-06-08 RX ORDER — SODIUM CHLORIDE, SODIUM LACTATE, POTASSIUM CHLORIDE, CALCIUM CHLORIDE 600; 310; 30; 20 MG/100ML; MG/100ML; MG/100ML; MG/100ML
INJECTION, SOLUTION INTRAVENOUS CONTINUOUS
Status: DISCONTINUED | OUTPATIENT
Start: 2023-06-08 | End: 2023-06-08 | Stop reason: HOSPADM

## 2023-06-08 RX ORDER — INSULIN LISPRO 100 [IU]/ML
1-15 INJECTION, SOLUTION INTRAVENOUS; SUBCUTANEOUS ONCE
Status: DISCONTINUED | OUTPATIENT
Start: 2023-06-08 | End: 2023-06-08 | Stop reason: HOSPADM

## 2023-06-08 RX ORDER — LIDOCAINE HYDROCHLORIDE 10 MG/ML
1 INJECTION, SOLUTION EPIDURAL; INFILTRATION; INTRACAUDAL; PERINEURAL
Status: DISCONTINUED | OUTPATIENT
Start: 2023-06-08 | End: 2023-06-08 | Stop reason: HOSPADM

## 2023-06-08 RX ORDER — FAMOTIDINE 20 MG/1
20 TABLET, FILM COATED ORAL ONCE
Status: DISCONTINUED | OUTPATIENT
Start: 2023-06-08 | End: 2023-06-08 | Stop reason: HOSPADM

## 2023-06-08 RX ORDER — PROPOFOL 10 MG/ML
INJECTION, EMULSION INTRAVENOUS PRN
Status: DISCONTINUED | OUTPATIENT
Start: 2023-06-08 | End: 2023-06-08 | Stop reason: SDUPTHER

## 2023-06-08 RX ADMIN — PROPOFOL 40 MG: 10 INJECTION, EMULSION INTRAVENOUS at 08:00

## 2023-06-08 RX ADMIN — PROPOFOL 25 MG: 10 INJECTION, EMULSION INTRAVENOUS at 08:13

## 2023-06-08 RX ADMIN — PROPOFOL 30 MG: 10 INJECTION, EMULSION INTRAVENOUS at 07:45

## 2023-06-08 RX ADMIN — PROPOFOL 40 MG: 10 INJECTION, EMULSION INTRAVENOUS at 08:10

## 2023-06-08 RX ADMIN — PROPOFOL 30 MG: 10 INJECTION, EMULSION INTRAVENOUS at 07:50

## 2023-06-08 RX ADMIN — PROPOFOL 20 MG: 10 INJECTION, EMULSION INTRAVENOUS at 07:42

## 2023-06-08 RX ADMIN — PROPOFOL 25 MG: 10 INJECTION, EMULSION INTRAVENOUS at 08:04

## 2023-06-08 RX ADMIN — PROPOFOL 30 MG: 10 INJECTION, EMULSION INTRAVENOUS at 07:53

## 2023-06-08 RX ADMIN — PROPOFOL 50 MG: 10 INJECTION, EMULSION INTRAVENOUS at 07:40

## 2023-06-08 RX ADMIN — PROPOFOL 40 MG: 10 INJECTION, EMULSION INTRAVENOUS at 07:55

## 2023-06-08 RX ADMIN — SODIUM CHLORIDE, POTASSIUM CHLORIDE, SODIUM LACTATE AND CALCIUM CHLORIDE: 600; 310; 30; 20 INJECTION, SOLUTION INTRAVENOUS at 07:20

## 2023-06-08 RX ADMIN — PROPOFOL 40 MG: 10 INJECTION, EMULSION INTRAVENOUS at 08:05

## 2023-06-08 ASSESSMENT — PAIN SCALES - GENERAL
PAINLEVEL_OUTOF10: 0
PAINLEVEL_OUTOF10: 0
PAINLEVEL_OUTOF10: 6

## 2023-06-08 ASSESSMENT — PAIN - FUNCTIONAL ASSESSMENT: PAIN_FUNCTIONAL_ASSESSMENT: 0-10

## 2023-06-08 ASSESSMENT — PAIN DESCRIPTION - LOCATION: LOCATION: ABDOMEN

## 2023-06-08 NOTE — DISCHARGE INSTRUCTIONS
health, and be sure to contact your doctor if:    You have a fever. You have nausea or vomiting. You have a change in bowel habits (new constipation or diarrhea). Your symptoms get worse or are not improving as expected. Where can you learn more? Go to http://www.woods.com/ and enter C571 to learn more about \"Colon Polyps: Care Instructions. \"  Current as of: June 6, 2022               Content Version: 13.6  © 2006-2023 [employer]. Care instructions adapted under license by [facility] ([facility]). If you have questions about a medical condition or this instruction, always ask your healthcare professional. [address] any warranty or liability for your use of this information.

## 2023-06-08 NOTE — H&P
History:   Procedure Laterality Date    CATARACT REMOVAL Left 2016     SECTION      CHOLECYSTECTOMY      COLONOSCOPY N/A 2017    COLONOSCOPY polypectomy performed by Natacha Spring MD at [facility] ENDOSCOPY    HYSTERECTOMY (CERVIX STATUS UNKNOWN)      ORTHOPEDIC SURGERY Left     Removal Bone Left Foot    ORTHOPEDIC SURGERY      left hand index/middle/ringer finger    ORTHOPEDIC SURGERY  20+    left thumb    ROTATOR CUFF REPAIR Left     TEMPORAL ARTERY LIGATN OR BX Bilateral 2016    Dr. Kiley Lanier       ROS: negative    Physical Exam: /72   Pulse 72   Temp 97.8 °F (36.6 °C) (Oral)   Resp 20   Ht 5' 7\" (1.702 m)   Wt 223 lb 12.8 oz (101.5 kg)   SpO2 100%   BMI 35.05 kg/m²   General appearance: alert, no distress  Eyes: pupils equal and reactive, extraocular eye movements intact  Nodes: No gross adenopathy in neck. Skin: no spider angiomata, jaundice, palmar erythema   Respiratory: clear to auscultation bilaterally  Cardiovascular: regular heart rate, no murmurs, no JVD, normal rate and regular rhythm  Abdomen: soft, non-tender, liver not enlarged, spleen not palpable, no obvious ascites  Extremities: no muscle wasting, no gross arthritic changes  Neurologic: alert and oriented, cranial nerves grossly intact, no asterixis    Labs:   Recent Results (from the past 24 hour(s))   POCT Glucose    Collection Time: 23  7:10 AM   Result Value Ref Range    POC Glucose 156 (H) 70 - 110 mg/dL         Imp/ Plan: Will proceed with colonoscopy as planned. Risk benefits alternative including but not limited to infection, bleeding, perforation of viscous, allergic reaction and resultant morbidity and mortality was discussed. Chance of missing a significant lesion due to various reasons were discussed.     Enmanuel Mcgowan MD   [facility], [facility]

## 2023-06-08 NOTE — ANESTHESIA PRE PROCEDURE
Department of Anesthesiology  Preprocedure Note       Name:  Jeanette Deluna   Age:  79 y.o.  :  1955                                          MRN:  [id number]         Date:  2023      Surgeon: Radha Wren):  Hannah Naqvi MD    Procedure: Procedure(s):  COLONOSCOPY    Medications prior to admission:   Prior to Admission medications    Medication Sig Start Date End Date Taking? Authorizing Provider   tiZANidine (ZANAFLEX) 4 MG tablet Take 1 tablet by mouth every 6 hours as needed   Yes Historical Provider, MD   traMADol [facility] ER) 100 MG extended release tablet Take 1 tablet by mouth daily. Yes Historical Provider, MD   Multiple Vitamins-Minerals (ONE-A-DAY 50 PLUS PO) Take by mouth   Yes Historical Provider, MD   ibuprofen (ADVIL;MOTRIN) 800 MG tablet Take 1 tablet by mouth every 6 hours as needed for Pain   Yes Historical Provider, MD   oxyCODONE-acetaminophen (PERCOCET)  MG per tablet Take 1 tablet by mouth 3 times daily.    Yes Historical Provider, MD   hydrOXYzine HCl (ATARAX) 25 MG tablet Take 1 tablet by mouth every 8 hours as needed for Itching   Yes Historical Provider, MD   aspirin 81 MG EC tablet Take by mouth daily    Ar Automatic Reconciliation   atorvastatin (LIPITOR) 40 MG tablet Take 0.5 tablets by mouth daily 10/18/18   Ar Automatic Reconciliation   glipiZIDE (GLUCOTROL) 10 MG tablet Take 1 tablet by mouth 2 times daily    Ar Automatic Reconciliation   losartan (COZAAR) 25 MG tablet TK 1 T PO D 10/19/16   Ar Automatic Reconciliation   omeprazole (PRILOSEC) 40 MG delayed release capsule Take 1 capsule by mouth daily    Ar Automatic Reconciliation   polyethylene glycol (GLYCOLAX) 17 GM/SCOOP powder Take 17 g by mouth daily 17   Ar Automatic Reconciliation   sitaGLIPtan-metFORMIN (JANUMET)  MG per tablet Take 1 tablet by mouth daily    Ar Automatic Reconciliation       Current medications:    Current Facility-Administered Medications   Medication Dose Route Frequency Provider

## 2023-06-08 NOTE — ANESTHESIA POSTPROCEDURE EVALUATION
Department of Anesthesiology  Postprocedure Note    Patient: Camila Martin  MRN: [id number]  YOB: 1955  Date of evaluation: 6/8/2023      Procedure Summary     Date: 06/08/23 Room / Location: [facility] ENDO 02 / [facility] ENDOSCOPY    Anesthesia Start: 0733 Anesthesia Stop: Torie Ward    Procedure: COLONOSCOPY with polypectomies (Abdomen) Diagnosis:       Personal history of colonic polyps      (Personal history of colonic polyps [Z86.010])    Surgeons: Miriam Baldwin MD Responsible Provider: Neeru Valladares MD    Anesthesia Type: MAC ASA Status: 3          Anesthesia Type: MAC    Freddie Phase I: Freddie Score: 10    Freddie Phase II:        Anesthesia Post Evaluation    Patient location during evaluation: PACU  Patient participation: complete - patient participated  Level of consciousness: sleepy but conscious  Pain score: 0  Airway patency: patent  Nausea & Vomiting: no nausea and no vomiting  Complications: no  Cardiovascular status: blood pressure returned to baseline  Respiratory status: acceptable  Hydration status: euvolemic

## 2023-08-23 ENCOUNTER — HOSPITAL ENCOUNTER (OUTPATIENT)
Facility: HOSPITAL | Age: 68
Discharge: HOME | End: 2023-08-26
Payer: MEDICARE

## 2023-08-23 DIAGNOSIS — E11.9 DIABETES MELLITUS WITHOUT COMPLICATION (HCC): ICD-10-CM

## 2023-08-23 DIAGNOSIS — G62.9 PERIPHERAL NERVE DISORDER: ICD-10-CM

## 2023-08-23 DIAGNOSIS — I10 ESSENTIAL HYPERTENSION, MALIGNANT: ICD-10-CM

## 2023-08-23 DIAGNOSIS — E78.2 MIXED HYPERLIPIDEMIA: ICD-10-CM

## 2023-08-23 LAB
ALBUMIN SERPL-MCNC: 3.4 G/DL (ref 3.4–5)
ALBUMIN/GLOB SERPL: 0.8 (ref 0.8–1.7)
ALP SERPL-CCNC: 175 U/L (ref 45–117)
ALT SERPL-CCNC: 19 U/L (ref 13–56)
ANION GAP SERPL CALC-SCNC: 5 MMOL/L (ref 3–18)
AST SERPL-CCNC: 10 U/L (ref 10–38)
BASOPHILS # BLD: 0 K/UL (ref 0–0.1)
BASOPHILS NFR BLD: 0 % (ref 0–2)
BILIRUB SERPL-MCNC: 0.4 MG/DL (ref 0.2–1)
BUN SERPL-MCNC: 9 MG/DL (ref 7–18)
BUN/CREAT SERPL: 11 (ref 12–20)
CALCIUM SERPL-MCNC: 9.6 MG/DL (ref 8.5–10.1)
CHLORIDE SERPL-SCNC: 108 MMOL/L (ref 100–111)
CHOLEST SERPL-MCNC: 249 MG/DL
CO2 SERPL-SCNC: 28 MMOL/L (ref 21–32)
CREAT SERPL-MCNC: 0.79 MG/DL (ref 0.6–1.3)
DIFFERENTIAL METHOD BLD: NORMAL
EOSINOPHIL # BLD: 0 K/UL (ref 0–0.4)
EOSINOPHIL NFR BLD: 0 % (ref 0–5)
ERYTHROCYTE [DISTWIDTH] IN BLOOD BY AUTOMATED COUNT: 14.3 % (ref 11.6–14.5)
EST. AVERAGE GLUCOSE BLD GHB EST-MCNC: 140 MG/DL
GLOBULIN SER CALC-MCNC: 4.1 G/DL (ref 2–4)
GLUCOSE SERPL-MCNC: 125 MG/DL (ref 74–99)
HBA1C MFR BLD: 6.5 % (ref 4.2–5.6)
HCT VFR BLD AUTO: 40.3 % (ref 35–45)
HDLC SERPL-MCNC: 56 MG/DL (ref 40–60)
HDLC SERPL: 4.4 (ref 0–5)
HGB BLD-MCNC: 12.8 G/DL (ref 12–16)
IMM GRANULOCYTES # BLD AUTO: 0 K/UL (ref 0–0.04)
IMM GRANULOCYTES NFR BLD AUTO: 0 % (ref 0–0.5)
LDLC SERPL CALC-MCNC: 161.6 MG/DL (ref 0–100)
LIPID PANEL: ABNORMAL
LYMPHOCYTES # BLD: 3.6 K/UL (ref 0.9–3.6)
LYMPHOCYTES NFR BLD: 39 % (ref 21–52)
MCH RBC QN AUTO: 28.5 PG (ref 24–34)
MCHC RBC AUTO-ENTMCNC: 31.8 G/DL (ref 31–37)
MCV RBC AUTO: 89.8 FL (ref 78–100)
MONOCYTES # BLD: 0.6 K/UL (ref 0.05–1.2)
MONOCYTES NFR BLD: 7 % (ref 3–10)
NEUTS SEG # BLD: 5 K/UL (ref 1.8–8)
NEUTS SEG NFR BLD: 54 % (ref 40–73)
NRBC # BLD: 0 K/UL (ref 0–0.01)
NRBC BLD-RTO: 0 PER 100 WBC
PLATELET # BLD AUTO: 288 K/UL (ref 135–420)
PMV BLD AUTO: 11.2 FL (ref 9.2–11.8)
POTASSIUM SERPL-SCNC: 4.7 MMOL/L (ref 3.5–5.5)
PROT SERPL-MCNC: 7.5 G/DL (ref 6.4–8.2)
RBC # BLD AUTO: 4.49 M/UL (ref 4.2–5.3)
SODIUM SERPL-SCNC: 141 MMOL/L (ref 136–145)
TRIGL SERPL-MCNC: 157 MG/DL
TSH SERPL DL<=0.05 MIU/L-ACNC: 1.59 UIU/ML (ref 0.36–3.74)
VIT B12 SERPL-MCNC: 785 PG/ML (ref 211–911)
VLDLC SERPL CALC-MCNC: 31.4 MG/DL
WBC # BLD AUTO: 9.3 K/UL (ref 4.6–13.2)

## 2023-08-23 PROCEDURE — 84443 ASSAY THYROID STIM HORMONE: CPT

## 2023-08-23 PROCEDURE — 80053 COMPREHEN METABOLIC PANEL: CPT

## 2023-08-23 PROCEDURE — 85025 COMPLETE CBC W/AUTO DIFF WBC: CPT

## 2023-08-23 PROCEDURE — 82607 VITAMIN B-12: CPT

## 2023-08-23 PROCEDURE — 36415 COLL VENOUS BLD VENIPUNCTURE: CPT

## 2023-08-23 PROCEDURE — 83036 HEMOGLOBIN GLYCOSYLATED A1C: CPT

## 2023-08-23 PROCEDURE — 80061 LIPID PANEL: CPT

## 2023-10-19 NOTE — PROGRESS NOTES
Instructions for your procedure at [facility] Date: 10/19/2023      Patient's Name: Luvenia Osgood      Procedure Date: 11/1        Please enter the main entrance of the hospital and check-in at the  located in the lobby. Do NOT eat or drink anything, including candy, gum, or ice chips after midnight prior to your procedure, unless it is part of your prep. Brush your teeth before coming to the hospital.You may swish with water, but do not swallow. No smoking/Vaping/E-Cigarettes 24 hours prior to the day of procedure. No alcohol 24 hours prior to the day of procedure. No recreational drugs for one week prior to the day of procedure. Bring Photo ID, Insurance information, and Co-pay if required on day of procedure. Bring in pertinent legal documents, such as, Medical Power of ANAHY RIVERO, DNR, Advance Directive, etc.  Leave all other valuables, including money/purse, at home. Remove jewelry, including ALL body piercings, nail polish, acrylic nails, and makeup (including mascara); no lotions, powders, deodorant, and/or perfume/cologne/after shave on the skin. Glasses and dentures may be worn to the hospital.  They must be removed prior to procedure. Please bring case/container for glasses or dentures. 11. Contacts should not be worn on day of procedure. 12. Call the office ([phone]) if you have symptoms of a cold or illness within 24-48 hours prior to your procedure. 13. AN ADULT (relative or friend 18 years or older) MUST DRIVE YOU HOME AFTER YOUR PROCEDURE. 14. Please make arrangements for a responsible adult (18 years or older) to be with you for 24 hours after your procedure. 13. ONE VISITOR will be allowed in the waiting area during your procedure. Special Instructions:      Bring list of CURRENT medications.   Follow instructions from the office regarding Bowel Prep, Vitamins, Iron, Blood Thinners, Insulin, Seizure, and Blood

## 2023-10-31 ENCOUNTER — ANESTHESIA EVENT (OUTPATIENT)
Facility: HOSPITAL | Age: 68
End: 2023-10-31
Payer: MEDICARE

## 2023-11-01 ENCOUNTER — HOSPITAL ENCOUNTER (OUTPATIENT)
Facility: HOSPITAL | Age: 68
Setting detail: OUTPATIENT SURGERY
Discharge: HOME | End: 2023-11-01
Attending: STUDENT IN AN ORGANIZED HEALTH CARE EDUCATION/TRAINING PROGRAM | Admitting: STUDENT IN AN ORGANIZED HEALTH CARE EDUCATION/TRAINING PROGRAM
Payer: MEDICARE

## 2023-11-01 ENCOUNTER — ANESTHESIA (OUTPATIENT)
Facility: HOSPITAL | Age: 68
End: 2023-11-01
Payer: MEDICARE

## 2023-11-01 VITALS
SYSTOLIC BLOOD PRESSURE: 121 MMHG | DIASTOLIC BLOOD PRESSURE: 61 MMHG | RESPIRATION RATE: 21 BRPM | HEART RATE: 69 BPM | BODY MASS INDEX: 35.47 KG/M2 | WEIGHT: 225.99 LBS | OXYGEN SATURATION: 98 % | HEIGHT: 67 IN | TEMPERATURE: 97.6 F

## 2023-11-01 LAB
GLUCOSE BLD STRIP.AUTO-MCNC: 172 MG/DL (ref 70–110)
GLUCOSE BLD STRIP.AUTO-MCNC: 197 MG/DL (ref 70–110)

## 2023-11-01 PROCEDURE — 7100000011 HC PHASE II RECOVERY - ADDTL 15 MIN: Performed by: STUDENT IN AN ORGANIZED HEALTH CARE EDUCATION/TRAINING PROGRAM

## 2023-11-01 PROCEDURE — 82962 GLUCOSE BLOOD TEST: CPT

## 2023-11-01 PROCEDURE — 2709999900 HC NON-CHARGEABLE SUPPLY: Performed by: STUDENT IN AN ORGANIZED HEALTH CARE EDUCATION/TRAINING PROGRAM

## 2023-11-01 PROCEDURE — 3700000000 HC ANESTHESIA ATTENDED CARE: Performed by: STUDENT IN AN ORGANIZED HEALTH CARE EDUCATION/TRAINING PROGRAM

## 2023-11-01 PROCEDURE — 88305 TISSUE EXAM BY PATHOLOGIST: CPT

## 2023-11-01 PROCEDURE — 3600007512: Performed by: STUDENT IN AN ORGANIZED HEALTH CARE EDUCATION/TRAINING PROGRAM

## 2023-11-01 PROCEDURE — 7100000010 HC PHASE II RECOVERY - FIRST 15 MIN: Performed by: STUDENT IN AN ORGANIZED HEALTH CARE EDUCATION/TRAINING PROGRAM

## 2023-11-01 PROCEDURE — 3600007502: Performed by: STUDENT IN AN ORGANIZED HEALTH CARE EDUCATION/TRAINING PROGRAM

## 2023-11-01 PROCEDURE — 7100000000 HC PACU RECOVERY - FIRST 15 MIN: Performed by: STUDENT IN AN ORGANIZED HEALTH CARE EDUCATION/TRAINING PROGRAM

## 2023-11-01 PROCEDURE — 3700000001 HC ADD 15 MINUTES (ANESTHESIA): Performed by: STUDENT IN AN ORGANIZED HEALTH CARE EDUCATION/TRAINING PROGRAM

## 2023-11-01 PROCEDURE — 6360000002 HC RX W HCPCS: Performed by: ANESTHESIOLOGY

## 2023-11-01 PROCEDURE — 2580000003 HC RX 258: Performed by: NURSE ANESTHETIST, CERTIFIED REGISTERED

## 2023-11-01 RX ORDER — PROPOFOL 10 MG/ML
INJECTION, EMULSION INTRAVENOUS PRN
Status: DISCONTINUED | OUTPATIENT
Start: 2023-11-01 | End: 2023-11-01 | Stop reason: SDUPTHER

## 2023-11-01 RX ORDER — FAMOTIDINE 20 MG/1
20 TABLET, FILM COATED ORAL ONCE
Status: DISCONTINUED | OUTPATIENT
Start: 2023-11-01 | End: 2023-11-01 | Stop reason: HOSPADM

## 2023-11-01 RX ORDER — INSULIN LISPRO 100 [IU]/ML
0-15 INJECTION, SOLUTION INTRAVENOUS; SUBCUTANEOUS ONCE
Status: DISCONTINUED | OUTPATIENT
Start: 2023-11-01 | End: 2023-11-01 | Stop reason: HOSPADM

## 2023-11-01 RX ORDER — LIDOCAINE HYDROCHLORIDE 10 MG/ML
1 INJECTION, SOLUTION EPIDURAL; INFILTRATION; INTRACAUDAL; PERINEURAL
Status: DISCONTINUED | OUTPATIENT
Start: 2023-11-01 | End: 2023-11-01 | Stop reason: HOSPADM

## 2023-11-01 RX ORDER — DEXTROSE MONOHYDRATE 100 MG/ML
INJECTION, SOLUTION INTRAVENOUS CONTINUOUS PRN
Status: DISCONTINUED | OUTPATIENT
Start: 2023-11-01 | End: 2023-11-01 | Stop reason: HOSPADM

## 2023-11-01 RX ORDER — SODIUM CHLORIDE, SODIUM LACTATE, POTASSIUM CHLORIDE, CALCIUM CHLORIDE 600; 310; 30; 20 MG/100ML; MG/100ML; MG/100ML; MG/100ML
INJECTION, SOLUTION INTRAVENOUS CONTINUOUS
Status: DISCONTINUED | OUTPATIENT
Start: 2023-11-01 | End: 2023-11-01 | Stop reason: HOSPADM

## 2023-11-01 RX ADMIN — PROPOFOL 50 MG: 10 INJECTION, EMULSION INTRAVENOUS at 08:29

## 2023-11-01 RX ADMIN — PROPOFOL 50 MG: 10 INJECTION, EMULSION INTRAVENOUS at 08:34

## 2023-11-01 RX ADMIN — PROPOFOL 50 MG: 10 INJECTION, EMULSION INTRAVENOUS at 08:19

## 2023-11-01 RX ADMIN — PROPOFOL 100 MG: 10 INJECTION, EMULSION INTRAVENOUS at 08:14

## 2023-11-01 RX ADMIN — PROPOFOL 50 MG: 10 INJECTION, EMULSION INTRAVENOUS at 08:24

## 2023-11-01 RX ADMIN — SODIUM CHLORIDE, POTASSIUM CHLORIDE, SODIUM LACTATE AND CALCIUM CHLORIDE: 600; 310; 30; 20 INJECTION, SOLUTION INTRAVENOUS at 07:42

## 2023-11-01 ASSESSMENT — LIFESTYLE VARIABLES: SMOKING_STATUS: 1

## 2023-11-01 ASSESSMENT — PAIN - FUNCTIONAL ASSESSMENT: PAIN_FUNCTIONAL_ASSESSMENT: 0-10

## 2023-11-01 NOTE — DISCHARGE INSTRUCTIONS
Colonoscopy: What to Expect at [address]  After a colonoscopy, you'll stay at the clinic until you wake up. Then you can go home. But you'll need to arrange for a ride. Your doctor will tell you when you can eat and do your other usual activities. Your doctor will talk to you about when you'll need your next colonoscopy. Your doctor can help you decide how often you need to be checked. This will depend on the results of your test and your risk for colorectal cancer. After the test, you may be bloated or have gas pains. You may need to pass gas. If a biopsy was done or a polyp was removed, you may have streaks of blood in your stool (feces) for a few days. Problems such as heavy rectal bleeding may not occur until several weeks after the test. This isn't common. But it can happen after polyps are removed. This care sheet gives you a general idea about how long it will take for you to recover. But each person recovers at a different pace. Follow the steps below to get better as quickly as possible. How can you care for yourself at home? Activity    Rest when you feel tired. You can do your normal activities when it feels okay to do so. Diet    Follow your doctor's directions for eating. Unless your doctor has told you not to, drink plenty of fluids. This helps to replace the fluids that were lost during the colon prep. Do not drink alcohol. Medicines    Your doctor will tell you if and when you can restart your medicines. You will also be given instructions about taking any new medicines. If you take aspirin or some other blood thinner, ask your doctor if and when to start taking it again. Make sure that you understand exactly what your doctor wants you to do. If polyps were removed or a biopsy was done during the test, your doctor may tell you not to take aspirin or other anti-inflammatory medicines for a few days. These include ibuprofen (Advil, Motrin) and naproxen (Aleve).

## 2023-11-01 NOTE — ANESTHESIA PRE PROCEDURE
Department of Anesthesiology  Preprocedure Note       Name:  Paula Dey   Age:  79 y.o.  :  1955                                          MRN:  [id number]         Date:  2023      Surgeon: Lincoln Mcardle):  Bennett Huang MD    Procedure: Procedure(s):  COLONOSCOPY    Medications prior to admission:   Prior to Admission medications    Medication Sig Start Date End Date Taking? Authorizing Provider   linaclotide (LINZESS) 145 MCG capsule Take 1 capsule by mouth every morning (before breakfast)   Yes Obdulia Robert MD   tiZANidine (ZANAFLEX) 4 MG tablet Take 1 tablet by mouth every 6 hours as needed    ProviderObdulia MD   traMADol [facility]) 100 MG extended release tablet Take 1 tablet by mouth daily. Obdulia Robert MD   ibuprofen (ADVIL;MOTRIN) 800 MG tablet Take 1 tablet by mouth every 6 hours as needed for Pain    Obdulia Robert MD   oxyCODONE-acetaminophen (PERCOCET)  MG per tablet Take 1 tablet by mouth 3 times daily.     Obdulia Robert MD   hydrOXYzine HCl (ATARAX) 25 MG tablet Take 1 tablet by mouth every 8 hours as needed for Itching    ProviderObdulia MD   aspirin 81 MG EC tablet Take by mouth daily    Automatic Reconciliation, Ar   atorvastatin (LIPITOR) 40 MG tablet Take 0.5 tablets by mouth daily 10/18/18   Automatic Reconciliation, Ar   glipiZIDE (GLUCOTROL) 10 MG tablet Take 1 tablet by mouth 2 times daily    Automatic Reconciliation, Ar   losartan (COZAAR) 25 MG tablet TK 1 T PO D 10/19/16   Automatic Reconciliation, Ar   omeprazole (PRILOSEC) 40 MG delayed release capsule Take 1 capsule by mouth daily    Automatic Reconciliation, Ar   polyethylene glycol (GLYCOLAX) 17 GM/SCOOP powder Take 17 g by mouth daily 17   Automatic Reconciliation, Ar   sitaGLIPtan-metFORMIN (JANUMET)  MG per tablet Take 1 tablet by mouth daily    Automatic Reconciliation, Ar       Current medications:    No current facility-administered medications for this

## 2023-12-28 NOTE — PERIOP NOTE
Instructions for your procedure at [facility]      Today's Date: 12/28/2023      Patient's Name: Chantelle Diggs      Procedure Date: 1/25/2024        Please enter the main entrance of the hospital and check-in at the  located in the lobby.      Do NOT eat or drink anything, including candy, gum, or ice chips after midnight prior to your procedure, unless it is part of your prep.  Brush your teeth before coming to the hospital.You may swish with water, but do not swallow.  No smoking/Vaping/E-Cigarettes 24 hours prior to the day of procedure.  No alcohol 24 hours prior to the day of procedure.  No recreational drugs for one week prior to the day of procedure.  Bring Photo ID, Insurance information, and Co-pay if required on day of procedure.  Bring in pertinent legal documents, such as, Medical Power of , DNR, Advance Directive, etc.  Leave all other valuables, including money/purse, at home.  Remove jewelry, including ALL body piercings, nail polish, acrylic nails, and makeup (including mascara); no lotions, powders, deodorant, and/or perfume/cologne/after shave on the skin.  Glasses and dentures may be worn to the hospital.  They must be removed prior to procedure. Please bring case/container for glasses or dentures.  11. Contacts should not be worn on day of procedure.   12. Call the office ([phone]) if you have symptoms of a cold or illness within 24-48 hours prior to your procedure.   13. AN ADULT (relative or friend 18 years or older) MUST DRIVE YOU HOME AFTER YOUR PROCEDURE.   14. Please make arrangements for a responsible adult (18 years or older) to be with you for 24 hours after your procedure.   15. ONE VISITOR will be allowed in the waiting area during your procedure.       Special Instructions:      Bring list of CURRENT medications.  Follow instructions from the office regarding Bowel Prep, Vitamins, Iron, Blood Thinners, Insulin, Seizure, and Blood

## 2024-01-15 ENCOUNTER — HOSPITAL ENCOUNTER (OUTPATIENT)
Facility: HOSPITAL | Age: 69
Discharge: HOME | End: 2024-01-17
Attending: STUDENT IN AN ORGANIZED HEALTH CARE EDUCATION/TRAINING PROGRAM
Payer: MEDICARE

## 2024-01-15 DIAGNOSIS — R10.13 ABDOMINAL PAIN, EPIGASTRIC: ICD-10-CM

## 2024-01-15 DIAGNOSIS — E66.9 OBESITY, UNSPECIFIED CLASSIFICATION, UNSPECIFIED OBESITY TYPE, UNSPECIFIED WHETHER SERIOUS COMORBIDITY PRESENT: ICD-10-CM

## 2024-01-15 DIAGNOSIS — F17.200 SMOKER: ICD-10-CM

## 2024-01-15 DIAGNOSIS — K75.81 NASH (NONALCOHOLIC STEATOHEPATITIS): ICD-10-CM

## 2024-01-15 DIAGNOSIS — Z86.010 PERSONAL HISTORY OF COLONIC POLYPS: ICD-10-CM

## 2024-01-15 LAB
VAS AORTA DIST AP: 1.71 CM
VAS AORTA DIST PSV: 75.1 CM/S
VAS AORTA DIST TR: 1.8 CM
VAS AORTA MID AP: 1.62 CM
VAS AORTA MID PSV: 57.8 CM/S
VAS AORTA MID TRANS: 1.87 CM
VAS AORTA PROX AP: 1.49 CM
VAS AORTA PROX PSV: 94.1 CM/S
VAS AORTA PROX TR: 1.57 CM
VAS CELIAC EDV: 10.6 CM/S
VAS CELIAC PSV: 158.5 CM/S
VAS COMMON HEPATIC EDV: 18.1 CM/S
VAS COMMON HEPATIC PSV: 178.9 CM/S
VAS DIST SMA EDV: 15.7 CM/S
VAS DIST SMA PSV: 97.9 CM/S
VAS IMA EDV: 16.3 CM/S
VAS IMA PSV: 131.3 CM/S
VAS MID SMA EDV: 18.9 CM/S
VAS MID SMA PSV: 148.4 CM/S
VAS PROX SMA EDV: 23.6 CM/S
VAS PROX SMA PSV: 311.5 CM/S
VAS SPLENIC EDV: 24.8 CM/S
VAS SPLENIC PSV: 184.5 CM/S

## 2024-01-15 PROCEDURE — 93975 VASCULAR STUDY: CPT

## 2024-01-15 PROCEDURE — 93975 VASCULAR STUDY: CPT | Performed by: INTERNAL MEDICINE

## 2024-01-24 ENCOUNTER — ANESTHESIA EVENT (OUTPATIENT)
Facility: HOSPITAL | Age: 69
End: 2024-01-24
Payer: MEDICARE

## 2024-01-25 ENCOUNTER — ANESTHESIA (OUTPATIENT)
Facility: HOSPITAL | Age: 69
End: 2024-01-25
Payer: MEDICARE

## 2024-01-25 ENCOUNTER — HOSPITAL ENCOUNTER (OUTPATIENT)
Facility: HOSPITAL | Age: 69
Setting detail: OUTPATIENT SURGERY
Discharge: HOME | End: 2024-01-25
Attending: STUDENT IN AN ORGANIZED HEALTH CARE EDUCATION/TRAINING PROGRAM | Admitting: STUDENT IN AN ORGANIZED HEALTH CARE EDUCATION/TRAINING PROGRAM
Payer: MEDICARE

## 2024-01-25 VITALS
HEART RATE: 74 BPM | DIASTOLIC BLOOD PRESSURE: 84 MMHG | TEMPERATURE: 97.8 F | SYSTOLIC BLOOD PRESSURE: 140 MMHG | BODY MASS INDEX: 36.4 KG/M2 | OXYGEN SATURATION: 100 % | RESPIRATION RATE: 21 BRPM | WEIGHT: 231.9 LBS | HEIGHT: 67 IN

## 2024-01-25 LAB
GLUCOSE BLD STRIP.AUTO-MCNC: 153 MG/DL (ref 70–110)
GLUCOSE BLD STRIP.AUTO-MCNC: 162 MG/DL (ref 70–110)

## 2024-01-25 PROCEDURE — 82962 GLUCOSE BLOOD TEST: CPT

## 2024-01-25 PROCEDURE — 3600007502: Performed by: STUDENT IN AN ORGANIZED HEALTH CARE EDUCATION/TRAINING PROGRAM

## 2024-01-25 PROCEDURE — 2500000003 HC RX 250 WO HCPCS: Performed by: NURSE ANESTHETIST, CERTIFIED REGISTERED

## 2024-01-25 PROCEDURE — 3700000000 HC ANESTHESIA ATTENDED CARE: Performed by: STUDENT IN AN ORGANIZED HEALTH CARE EDUCATION/TRAINING PROGRAM

## 2024-01-25 PROCEDURE — 3600007512: Performed by: STUDENT IN AN ORGANIZED HEALTH CARE EDUCATION/TRAINING PROGRAM

## 2024-01-25 PROCEDURE — 88305 TISSUE EXAM BY PATHOLOGIST: CPT

## 2024-01-25 PROCEDURE — 2709999900 HC NON-CHARGEABLE SUPPLY: Performed by: STUDENT IN AN ORGANIZED HEALTH CARE EDUCATION/TRAINING PROGRAM

## 2024-01-25 PROCEDURE — 6360000002 HC RX W HCPCS: Performed by: NURSE ANESTHETIST, CERTIFIED REGISTERED

## 2024-01-25 PROCEDURE — 2580000003 HC RX 258: Performed by: NURSE ANESTHETIST, CERTIFIED REGISTERED

## 2024-01-25 PROCEDURE — 7100000010 HC PHASE II RECOVERY - FIRST 15 MIN: Performed by: STUDENT IN AN ORGANIZED HEALTH CARE EDUCATION/TRAINING PROGRAM

## 2024-01-25 PROCEDURE — 7100000000 HC PACU RECOVERY - FIRST 15 MIN: Performed by: STUDENT IN AN ORGANIZED HEALTH CARE EDUCATION/TRAINING PROGRAM

## 2024-01-25 PROCEDURE — 3700000001 HC ADD 15 MINUTES (ANESTHESIA): Performed by: STUDENT IN AN ORGANIZED HEALTH CARE EDUCATION/TRAINING PROGRAM

## 2024-01-25 RX ORDER — LIDOCAINE HYDROCHLORIDE 20 MG/ML
INJECTION, SOLUTION EPIDURAL; INFILTRATION; INTRACAUDAL; PERINEURAL PRN
Status: DISCONTINUED | OUTPATIENT
Start: 2024-01-25 | End: 2024-01-25 | Stop reason: SDUPTHER

## 2024-01-25 RX ORDER — PROPOFOL 10 MG/ML
INJECTION, EMULSION INTRAVENOUS PRN
Status: DISCONTINUED | OUTPATIENT
Start: 2024-01-25 | End: 2024-01-25 | Stop reason: SDUPTHER

## 2024-01-25 RX ORDER — SIMETHICONE 20 MG/.3ML
EMULSION ORAL
Status: DISCONTINUED
Start: 2024-01-25 | End: 2024-01-25 | Stop reason: HOSPADM

## 2024-01-25 RX ORDER — DEXTROSE MONOHYDRATE 100 MG/ML
INJECTION, SOLUTION INTRAVENOUS CONTINUOUS PRN
Status: DISCONTINUED | OUTPATIENT
Start: 2024-01-25 | End: 2024-01-25 | Stop reason: HOSPADM

## 2024-01-25 RX ORDER — LIDOCAINE HYDROCHLORIDE 10 MG/ML
1 INJECTION, SOLUTION EPIDURAL; INFILTRATION; INTRACAUDAL; PERINEURAL
Status: DISCONTINUED | OUTPATIENT
Start: 2024-01-25 | End: 2024-01-25 | Stop reason: HOSPADM

## 2024-01-25 RX ORDER — SODIUM CHLORIDE, SODIUM LACTATE, POTASSIUM CHLORIDE, CALCIUM CHLORIDE 600; 310; 30; 20 MG/100ML; MG/100ML; MG/100ML; MG/100ML
INJECTION, SOLUTION INTRAVENOUS CONTINUOUS
Status: DISCONTINUED | OUTPATIENT
Start: 2024-01-25 | End: 2024-01-25 | Stop reason: HOSPADM

## 2024-01-25 RX ORDER — ESMOLOL HYDROCHLORIDE 10 MG/ML
INJECTION INTRAVENOUS PRN
Status: DISCONTINUED | OUTPATIENT
Start: 2024-01-25 | End: 2024-01-25 | Stop reason: SDUPTHER

## 2024-01-25 RX ORDER — INSULIN LISPRO 100 [IU]/ML
0-4 INJECTION, SOLUTION INTRAVENOUS; SUBCUTANEOUS EVERY 4 HOURS
Status: DISCONTINUED | OUTPATIENT
Start: 2024-01-25 | End: 2024-01-25 | Stop reason: HOSPADM

## 2024-01-25 RX ORDER — FAMOTIDINE 20 MG/1
20 TABLET, FILM COATED ORAL ONCE
Status: DISCONTINUED | OUTPATIENT
Start: 2024-01-25 | End: 2024-01-25 | Stop reason: HOSPADM

## 2024-01-25 RX ORDER — GLYCOPYRROLATE 0.2 MG/ML
INJECTION INTRAMUSCULAR; INTRAVENOUS PRN
Status: DISCONTINUED | OUTPATIENT
Start: 2024-01-25 | End: 2024-01-25 | Stop reason: SDUPTHER

## 2024-01-25 RX ADMIN — SODIUM CHLORIDE, POTASSIUM CHLORIDE, SODIUM LACTATE AND CALCIUM CHLORIDE: 600; 310; 30; 20 INJECTION, SOLUTION INTRAVENOUS at 07:30

## 2024-01-25 RX ADMIN — ESMOLOL HYDROCHLORIDE 30 MG: 10 INJECTION, SOLUTION INTRAVENOUS at 07:39

## 2024-01-25 RX ADMIN — PROPOFOL 150 MG: 10 INJECTION, EMULSION INTRAVENOUS at 07:39

## 2024-01-25 RX ADMIN — PROPOFOL 50 MG: 10 INJECTION, EMULSION INTRAVENOUS at 07:40

## 2024-01-25 RX ADMIN — LIDOCAINE HYDROCHLORIDE 100 MG: 20 INJECTION, SOLUTION EPIDURAL; INFILTRATION; INTRACAUDAL; PERINEURAL at 07:39

## 2024-01-25 RX ADMIN — GLYCOPYRROLATE 0.2 MG: 0.2 INJECTION INTRAMUSCULAR; INTRAVENOUS at 07:30

## 2024-01-25 ASSESSMENT — PAIN - FUNCTIONAL ASSESSMENT: PAIN_FUNCTIONAL_ASSESSMENT: 0-10

## 2024-01-25 ASSESSMENT — LIFESTYLE VARIABLES: SMOKING_STATUS: 1

## 2024-01-25 NOTE — H&P
[email or web address]  [phone]    Chief Complaint: Epigastric pain    PMH:   Past Medical History:   Diagnosis Date    Cataract     Chronic pain     lower back pain    Diabetes (HCC)     Hypertension     Insomnia     Musculoskeletal disorder     herniated disk; bilat feet; left thumb;     Neuropathy     legs    Neuropathy     Thromboembolus (HCC)     Left Leg     Allergies:   Allergies   Allergen Reactions    Gabapentin Anaphylaxis    Celecoxib Swelling    Pregabalin Swelling    Penicillins Rash     Medications:   Current Facility-Administered Medications:     lactated ringers IV soln infusion, , IntraVENous, Continuous, Magy Maria APRN - CRNA    lidocaine PF 1 % injection 1 mL, 1 mL, IntraDERmal, Once PRN, Magy Maria APRN - CRNA    famotidine (PEPCID) tablet 20 mg, 20 mg, Oral, Once, Magy Maria APRN - CRNA    glucose chewable tablet 16 g, 4 tablet, Oral, PRN, Magy Maria APRN - CRNA    dextrose bolus 10% 125 mL, 125 mL, IntraVENous, PRN **OR** dextrose bolus 10% 250 mL, 250 mL, IntraVENous, PRN, Magy Maria APRN - CRNA    glucagon injection 1 mg, 1 mg, SubCUTAneous, PRN, Magy Maria APRN - CRNA    dextrose 10 % infusion, , IntraVENous, Continuous PRN, Magy Maria APRN - CRNA    insulin lispro (HUMALOG) injection vial 0-4 Units, 0-4 Units, SubCUTAneous, Q4H, Magy Maria APRN - CRNA  FH:   Family History   Problem Relation Age of Onset    Diabetes Mother     Hypertension Mother     Diabetes Father     Hypertension Father     Diabetes Brother      Social:   Social History     Socioeconomic History    Marital status:      Spouse name: None    Number of children: None    Years of education: None    Highest education level: None   Tobacco Use    Smoking status: Every Day     Current packs/day: 0.25     Types: Cigarettes    Smokeless tobacco: Never   Vaping Use    Vaping Use: Never used   Substance and Sexual Activity    Alcohol use: No     Alcohol/week: 0.0

## 2024-01-25 NOTE — ANESTHESIA POSTPROCEDURE EVALUATION
Department of Anesthesiology  Postprocedure Note    Patient: Chantelle Diggs  MRN: [id number]  YOB: 1955  Date of evaluation: 1/25/2024    Procedure Summary       Date: 01/25/24 Room / Location: [facility] ENDO 02 / [facility] ENDOSCOPY    Anesthesia Start: 0730 Anesthesia Stop: 0745    Procedure: EGD ESOPHAGOGASTRODUODENOSCOPY with bxs (Upper GI Region) Diagnosis:       Abdominal pain, epigastric      OWEN (nonalcoholic steatohepatitis)      Obesity (BMI 30-39.9)      Smoker      Personal history of colonic polyps      (Abdominal pain, epigastric [R10.13])      (OWEN (nonalcoholic steatohepatitis) [K75.81])      (Obesity (BMI 30-39.9) [E66.9])      (Smoker [F17.200])      (Personal history of colonic polyps [Z86.010])    Surgeons: Eren Kelly MD Responsible Provider: Sadiq Spence DO    Anesthesia Type: MAC ASA Status: 3            Anesthesia Type: MAC    Freddie Phase I: Freddie Score: 9    Freddie Phase II: Freddie Score: 10    Anesthesia Post Evaluation    Patient location during evaluation: PACU  Patient participation: complete - patient participated  Level of consciousness: awake and alert  Airway patency: patent  Nausea & Vomiting: no nausea and no vomiting  Cardiovascular status: hemodynamically stable  Respiratory status: acceptable  Hydration status: stable  Multimodal analgesia pain management approach    No notable events documented.

## 2024-01-25 NOTE — ANESTHESIA PRE PROCEDURE
Department of Anesthesiology  Preprocedure Note       Name:  Chantelle Diggs   Age:  69 y.o.  :  1955                                          MRN:  [id number]         Date:  2024      Surgeon: Surgeon(s):  Eren Kelly MD    Procedure: Procedure(s):  EGD ESOPHAGOGASTRODUODENOSCOPY    Medications prior to admission:   Prior to Admission medications    Medication Sig Start Date End Date Taking? Authorizing Provider   linaclotide (LINZESS) 145 MCG capsule Take 1 capsule by mouth every morning (before breakfast)    Obdulia Robert MD   tiZANidine (ZANAFLEX) 4 MG tablet Take 1 tablet by mouth every 6 hours as needed    ProviderObdulia MD   traMADol (ULTRAM ER) 100 MG extended release tablet Take 1 tablet by mouth daily.  Patient not taking: Reported on 2023    Obdulia Robert MD   ibuprofen (ADVIL;MOTRIN) 800 MG tablet Take 1 tablet by mouth every 6 hours as needed for Pain    Obdulia Robert MD   oxyCODONE-acetaminophen (PERCOCET)  MG per tablet Take 1 tablet by mouth 3 times daily.    Provider, MD Obdulia   hydrOXYzine HCl (ATARAX) 25 MG tablet Take 1 tablet by mouth every 8 hours as needed for Itching    ProviderObdulia MD   aspirin 81 MG EC tablet Take by mouth daily    Automatic Reconciliation, Ar   atorvastatin (LIPITOR) 40 MG tablet Take 0.5 tablets by mouth daily 10/18/18   Automatic Reconciliation, Ar   glipiZIDE (GLUCOTROL) 10 MG tablet Take 1 tablet by mouth daily    Automatic Reconciliation, Ar   losartan (COZAAR) 25 MG tablet TK 1 T PO D 10/19/16   Automatic Reconciliation, Ar   omeprazole (PRILOSEC) 40 MG delayed release capsule Take 1 capsule by mouth daily  Patient not taking: Reported on 2023    Automatic Reconciliation, Ar   polyethylene glycol (GLYCOLAX) 17 GM/SCOOP powder Take 17 g by mouth daily 17   Automatic Reconciliation, Ar   sitaGLIPtan-metFORMIN (JANUMET)  MG per tablet Take 1 tablet by mouth daily    Automatic

## 2024-01-31 ENCOUNTER — HOSPITAL ENCOUNTER (OUTPATIENT)
Facility: HOSPITAL | Age: 69
Discharge: HOME | End: 2024-02-03
Payer: MEDICARE

## 2024-01-31 DIAGNOSIS — M85.89 OTHER SPECIFIED DISORDERS OF BONE DENSITY AND STRUCTURE, MULTIPLE SITES: ICD-10-CM

## 2024-01-31 PROCEDURE — 77080 DXA BONE DENSITY AXIAL: CPT

## 2024-03-23 ENCOUNTER — HOSPITAL ENCOUNTER (EMERGENCY)
Facility: HOSPITAL | Age: 69
Discharge: HOME | End: 2024-03-23
Attending: STUDENT IN AN ORGANIZED HEALTH CARE EDUCATION/TRAINING PROGRAM
Payer: MEDICARE

## 2024-03-23 ENCOUNTER — APPOINTMENT (OUTPATIENT)
Facility: HOSPITAL | Age: 69
End: 2024-03-23
Payer: MEDICARE

## 2024-03-23 VITALS
OXYGEN SATURATION: 100 % | BODY MASS INDEX: 35.79 KG/M2 | DIASTOLIC BLOOD PRESSURE: 62 MMHG | WEIGHT: 228 LBS | SYSTOLIC BLOOD PRESSURE: 122 MMHG | TEMPERATURE: 98.4 F | HEART RATE: 66 BPM | RESPIRATION RATE: 18 BRPM | HEIGHT: 67 IN

## 2024-03-23 DIAGNOSIS — R51.9 ACUTE NONINTRACTABLE HEADACHE, UNSPECIFIED HEADACHE TYPE: Primary | ICD-10-CM

## 2024-03-23 LAB
ALBUMIN SERPL-MCNC: 3.4 G/DL (ref 3.4–5)
ALBUMIN/GLOB SERPL: 0.7 (ref 0.8–1.7)
ALP SERPL-CCNC: 162 U/L (ref 45–117)
ALT SERPL-CCNC: 22 U/L (ref 13–56)
ANION GAP SERPL CALC-SCNC: 7 MMOL/L (ref 3–18)
AST SERPL-CCNC: 10 U/L (ref 10–38)
BILIRUB SERPL-MCNC: 0.2 MG/DL (ref 0.2–1)
BUN SERPL-MCNC: 14 MG/DL (ref 7–18)
BUN/CREAT SERPL: 18 (ref 12–20)
CALCIUM SERPL-MCNC: 9.6 MG/DL (ref 8.5–10.1)
CHLORIDE SERPL-SCNC: 108 MMOL/L (ref 100–111)
CO2 SERPL-SCNC: 27 MMOL/L (ref 21–32)
CREAT SERPL-MCNC: 0.76 MG/DL (ref 0.6–1.3)
ERYTHROCYTE [DISTWIDTH] IN BLOOD BY AUTOMATED COUNT: 14.2 % (ref 11.6–14.5)
GLOBULIN SER CALC-MCNC: 4.7 G/DL (ref 2–4)
GLUCOSE SERPL-MCNC: 117 MG/DL (ref 74–99)
HCT VFR BLD AUTO: 38.6 % (ref 35–45)
HGB BLD-MCNC: 12.7 G/DL (ref 12–16)
MCH RBC QN AUTO: 28.7 PG (ref 24–34)
MCHC RBC AUTO-ENTMCNC: 32.9 G/DL (ref 31–37)
MCV RBC AUTO: 87.1 FL (ref 78–100)
NRBC # BLD: 0 K/UL (ref 0–0.01)
NRBC BLD-RTO: 0 PER 100 WBC
PLATELET # BLD AUTO: 276 K/UL (ref 135–420)
PMV BLD AUTO: 11.5 FL (ref 9.2–11.8)
POTASSIUM SERPL-SCNC: 3.5 MMOL/L (ref 3.5–5.5)
PROT SERPL-MCNC: 8.1 G/DL (ref 6.4–8.2)
RBC # BLD AUTO: 4.43 M/UL (ref 4.2–5.3)
SODIUM SERPL-SCNC: 142 MMOL/L (ref 136–145)
WBC # BLD AUTO: 7.7 K/UL (ref 4.6–13.2)

## 2024-03-23 PROCEDURE — 70450 CT HEAD/BRAIN W/O DYE: CPT

## 2024-03-23 PROCEDURE — 96374 THER/PROPH/DIAG INJ IV PUSH: CPT

## 2024-03-23 PROCEDURE — 6360000002 HC RX W HCPCS: Performed by: HEALTH CARE PROVIDER

## 2024-03-23 PROCEDURE — 96361 HYDRATE IV INFUSION ADD-ON: CPT

## 2024-03-23 PROCEDURE — 85027 COMPLETE CBC AUTOMATED: CPT

## 2024-03-23 PROCEDURE — 80053 COMPREHEN METABOLIC PANEL: CPT

## 2024-03-23 PROCEDURE — 2580000003 HC RX 258: Performed by: HEALTH CARE PROVIDER

## 2024-03-23 PROCEDURE — 99284 EMERGENCY DEPT VISIT MOD MDM: CPT

## 2024-03-23 PROCEDURE — 96375 TX/PRO/DX INJ NEW DRUG ADDON: CPT

## 2024-03-23 RX ORDER — 0.9 % SODIUM CHLORIDE 0.9 %
1000 INTRAVENOUS SOLUTION INTRAVENOUS ONCE
Status: COMPLETED | OUTPATIENT
Start: 2024-03-23 | End: 2024-03-23

## 2024-03-23 RX ORDER — KETOROLAC TROMETHAMINE 15 MG/ML
15 INJECTION, SOLUTION INTRAMUSCULAR; INTRAVENOUS
Status: COMPLETED | OUTPATIENT
Start: 2024-03-23 | End: 2024-03-23

## 2024-03-23 RX ORDER — DEXAMETHASONE SODIUM PHOSPHATE 4 MG/ML
10 INJECTION, SOLUTION INTRA-ARTICULAR; INTRALESIONAL; INTRAMUSCULAR; INTRAVENOUS; SOFT TISSUE ONCE
Status: COMPLETED | OUTPATIENT
Start: 2024-03-23 | End: 2024-03-23

## 2024-03-23 RX ORDER — DIPHENHYDRAMINE HYDROCHLORIDE 50 MG/ML
25 INJECTION INTRAMUSCULAR; INTRAVENOUS ONCE
Status: COMPLETED | OUTPATIENT
Start: 2024-03-23 | End: 2024-03-23

## 2024-03-23 RX ORDER — METOCLOPRAMIDE HYDROCHLORIDE 5 MG/ML
10 INJECTION INTRAMUSCULAR; INTRAVENOUS
Status: COMPLETED | OUTPATIENT
Start: 2024-03-23 | End: 2024-03-23

## 2024-03-23 RX ADMIN — SODIUM CHLORIDE 1000 ML: 9 INJECTION, SOLUTION INTRAVENOUS at 16:59

## 2024-03-23 RX ADMIN — DEXAMETHASONE SODIUM PHOSPHATE 10 MG: 4 INJECTION INTRA-ARTICULAR; INTRALESIONAL; INTRAMUSCULAR; INTRAVENOUS; SOFT TISSUE at 17:00

## 2024-03-23 RX ADMIN — METOCLOPRAMIDE HYDROCHLORIDE 10 MG: 5 INJECTION INTRAMUSCULAR; INTRAVENOUS at 17:06

## 2024-03-23 RX ADMIN — KETOROLAC TROMETHAMINE 15 MG: 15 INJECTION, SOLUTION INTRAMUSCULAR; INTRAVENOUS at 17:13

## 2024-03-23 RX ADMIN — DIPHENHYDRAMINE HYDROCHLORIDE 25 MG: 50 INJECTION, SOLUTION INTRAMUSCULAR; INTRAVENOUS at 17:10

## 2024-03-23 ASSESSMENT — ENCOUNTER SYMPTOMS
RHINORRHEA: 0
PHOTOPHOBIA: 0
NAUSEA: 0
VOMITING: 0
SORE THROAT: 0
SINUS PRESSURE: 0
SHORTNESS OF BREATH: 0
DIARRHEA: 0
FACIAL SWELLING: 0
VOICE CHANGE: 0
COLOR CHANGE: 0
SINUS PAIN: 0
CHEST TIGHTNESS: 0
COUGH: 0
ABDOMINAL PAIN: 0
BACK PAIN: 1
TROUBLE SWALLOWING: 0

## 2024-03-23 ASSESSMENT — PAIN DESCRIPTION - DESCRIPTORS: DESCRIPTORS: ACHING

## 2024-03-23 ASSESSMENT — PAIN - FUNCTIONAL ASSESSMENT: PAIN_FUNCTIONAL_ASSESSMENT: 0-10

## 2024-03-23 ASSESSMENT — PAIN SCALES - GENERAL
PAINLEVEL_OUTOF10: 9
PAINLEVEL_OUTOF10: 7

## 2024-03-23 ASSESSMENT — PAIN DESCRIPTION - LOCATION
LOCATION: HEAD
LOCATION: HEAD

## 2024-03-23 NOTE — ED PROVIDER NOTES
EMERGENCY DEPARTMENT HISTORY AND PHYSICAL EXAM        Date: 3/23/2024  Patient Name: Chantelle Diggs    History of Presenting Illness     Chief Complaint   Patient presents with    Headache       History Provided By: History obtained from patient    HPI: Chantelle Diggs, 69 y.o. female presents to the ED with cc of right temporal headache x 7 days    Patient reports insidious onset of a right temporal headache 7 days ago.  She denies any visual changes, no vomiting no altered level of consciousness.  She was concerned that the eye was because of the headache so she went to her ophthalmologist who found no cause.  She had a's outpatient visit with ENT today due to vocal cord polyp, ENT saw no concerning findings but recommended she go to the emergency department due to duration and severity of symptoms.    Blood thinners: ASA 81 mg  Pmh: GERD, osteoarthritis of spine, dyslipidemia, chronic pain disorder, type 2 diabetes,    No nausea, vomiting, diarrhea, fever, chills, chest pain, shortness of breath, leg swelling     There are no other complaints, changes, or physical findings at this time.    Records Reviewed:   PCP: Shiloh Bianchi APRN - NP    No current facility-administered medications on file prior to encounter.     Current Outpatient Medications on File Prior to Encounter   Medication Sig Dispense Refill    linaclotide (LINZESS) 145 MCG capsule Take 1 capsule by mouth every morning (before breakfast)      tiZANidine (ZANAFLEX) 4 MG tablet Take 1 tablet by mouth every 6 hours as needed      traMADol (ULTRAM ER) 100 MG extended release tablet Take 1 tablet by mouth daily. (Patient not taking: Reported on 12/28/2023)      ibuprofen (ADVIL;MOTRIN) 800 MG tablet Take 1 tablet by mouth every 6 hours as needed for Pain      oxyCODONE-acetaminophen (PERCOCET)  MG per tablet Take 1 tablet by mouth 3 times daily.      hydrOXYzine HCl (ATARAX) 25 MG tablet Take 1 tablet by mouth every 8 hours as needed for Itching    known as: COZAAR     omeprazole 40 MG delayed release capsule  Commonly known as: PRILOSEC     oxyCODONE-acetaminophen  MG per tablet  Commonly known as: PERCOCET     polyethylene glycol 17 GM/SCOOP powder  Commonly known as: GLYCOLAX     tiZANidine 4 MG tablet  Commonly known as: ZANAFLEX     traMADol 100 MG extended release tablet  Commonly known as: ULTRAM ER              Follow-ups:  [facility] EMERGENCY DEPT  [address] [place] [id number] [phone]    If symptoms worsen            Mika Romano PA-C  03/23/24 [id number]

## 2024-03-23 NOTE — ED TRIAGE NOTES
Pt states that has had a headache to the right frontal area since last Sunday. Denies injury. Denies vomiting. Denies change in vision. Reports loud noise hurts head. States pain is constant.

## 2024-06-05 ENCOUNTER — HOSPITAL ENCOUNTER (OUTPATIENT)
Facility: HOSPITAL | Age: 69
Discharge: HOME | End: 2024-06-08
Payer: MEDICARE

## 2024-06-05 DIAGNOSIS — Z01.818 PREOP EXAMINATION: ICD-10-CM

## 2024-06-05 PROCEDURE — 36415 COLL VENOUS BLD VENIPUNCTURE: CPT

## 2024-06-05 PROCEDURE — 80307 DRUG TEST PRSMV CHEM ANLYZR: CPT

## 2024-06-06 LAB
COMMENT:: NORMAL
COTININE UR QL SCN: NEGATIVE NG/ML

## 2024-06-19 ENCOUNTER — HOSPITAL ENCOUNTER (OUTPATIENT)
Facility: HOSPITAL | Age: 69
Discharge: HOME | End: 2024-06-22
Payer: MEDICARE

## 2024-06-19 ENCOUNTER — TRANSCRIBE ORDERS (OUTPATIENT)
Facility: HOSPITAL | Age: 69
End: 2024-06-19

## 2024-06-19 DIAGNOSIS — E11.42 DIABETIC POLYNEUROPATHY ASSOCIATED WITH TYPE 2 DIABETES MELLITUS (HCC): ICD-10-CM

## 2024-06-19 DIAGNOSIS — I10 ESSENTIAL HYPERTENSION, MALIGNANT: Primary | ICD-10-CM

## 2024-06-19 DIAGNOSIS — E78.2 MIXED HYPERLIPIDEMIA: ICD-10-CM

## 2024-06-19 DIAGNOSIS — E55.9 AVITAMINOSIS D: ICD-10-CM

## 2024-06-19 DIAGNOSIS — I10 ESSENTIAL HYPERTENSION, MALIGNANT: ICD-10-CM

## 2024-06-19 LAB
25(OH)D3 SERPL-MCNC: 25 NG/ML (ref 30–100)
ALBUMIN SERPL-MCNC: 3.6 G/DL (ref 3.4–5)
ALBUMIN/GLOB SERPL: 0.8 (ref 0.8–1.7)
ALP SERPL-CCNC: 161 U/L (ref 45–117)
ALT SERPL-CCNC: 24 U/L (ref 13–56)
ANION GAP SERPL CALC-SCNC: 6 MMOL/L (ref 3–18)
AST SERPL-CCNC: 18 U/L (ref 10–38)
BASOPHILS # BLD: 0 K/UL (ref 0–0.1)
BASOPHILS NFR BLD: 0 % (ref 0–2)
BILIRUB SERPL-MCNC: 0.6 MG/DL (ref 0.2–1)
BUN SERPL-MCNC: 11 MG/DL (ref 7–18)
BUN/CREAT SERPL: 13 (ref 12–20)
CALCIUM SERPL-MCNC: 9.7 MG/DL (ref 8.5–10.1)
CHLORIDE SERPL-SCNC: 111 MMOL/L (ref 100–111)
CHOLEST SERPL-MCNC: 153 MG/DL
CO2 SERPL-SCNC: 26 MMOL/L (ref 21–32)
CREAT SERPL-MCNC: 0.87 MG/DL (ref 0.6–1.3)
DIFFERENTIAL METHOD BLD: NORMAL
EOSINOPHIL # BLD: 0 K/UL (ref 0–0.4)
EOSINOPHIL NFR BLD: 0 % (ref 0–5)
ERYTHROCYTE [DISTWIDTH] IN BLOOD BY AUTOMATED COUNT: 13.7 % (ref 11.6–14.5)
EST. AVERAGE GLUCOSE BLD GHB EST-MCNC: 146 MG/DL
GLOBULIN SER CALC-MCNC: 4.3 G/DL (ref 2–4)
GLUCOSE SERPL-MCNC: 166 MG/DL (ref 74–99)
HBA1C MFR BLD: 6.7 % (ref 4.2–5.6)
HCT VFR BLD AUTO: 38.8 % (ref 35–45)
HDLC SERPL-MCNC: 53 MG/DL (ref 40–60)
HDLC SERPL: 2.9 (ref 0–5)
HGB BLD-MCNC: 12.5 G/DL (ref 12–16)
IMM GRANULOCYTES # BLD AUTO: 0 K/UL (ref 0–0.04)
IMM GRANULOCYTES NFR BLD AUTO: 0 % (ref 0–0.5)
LDLC SERPL CALC-MCNC: 73.2 MG/DL (ref 0–100)
LIPID PANEL: NORMAL
LYMPHOCYTES # BLD: 3.1 K/UL (ref 0.9–3.6)
LYMPHOCYTES NFR BLD: 40 % (ref 21–52)
MCH RBC QN AUTO: 28.3 PG (ref 24–34)
MCHC RBC AUTO-ENTMCNC: 32.2 G/DL (ref 31–37)
MCV RBC AUTO: 88 FL (ref 78–100)
MONOCYTES # BLD: 0.5 K/UL (ref 0.05–1.2)
MONOCYTES NFR BLD: 6 % (ref 3–10)
NEUTS SEG # BLD: 4.1 K/UL (ref 1.8–8)
NEUTS SEG NFR BLD: 53 % (ref 40–73)
NRBC # BLD: 0 K/UL (ref 0–0.01)
NRBC BLD-RTO: 0 PER 100 WBC
PLATELET # BLD AUTO: 271 K/UL (ref 135–420)
PMV BLD AUTO: 11.6 FL (ref 9.2–11.8)
POTASSIUM SERPL-SCNC: 4.3 MMOL/L (ref 3.5–5.5)
PROT SERPL-MCNC: 7.9 G/DL (ref 6.4–8.2)
RBC # BLD AUTO: 4.41 M/UL (ref 4.2–5.3)
SODIUM SERPL-SCNC: 143 MMOL/L (ref 136–145)
TRIGL SERPL-MCNC: 134 MG/DL
TSH SERPL DL<=0.05 MIU/L-ACNC: 1.2 UIU/ML (ref 0.36–3.74)
VLDLC SERPL CALC-MCNC: 26.8 MG/DL
WBC # BLD AUTO: 7.6 K/UL (ref 4.6–13.2)

## 2024-06-19 PROCEDURE — 85025 COMPLETE CBC W/AUTO DIFF WBC: CPT

## 2024-06-19 PROCEDURE — 36415 COLL VENOUS BLD VENIPUNCTURE: CPT

## 2024-06-19 PROCEDURE — 82306 VITAMIN D 25 HYDROXY: CPT

## 2024-06-19 PROCEDURE — 80053 COMPREHEN METABOLIC PANEL: CPT

## 2024-06-19 PROCEDURE — 83036 HEMOGLOBIN GLYCOSYLATED A1C: CPT

## 2024-06-19 PROCEDURE — 84443 ASSAY THYROID STIM HORMONE: CPT

## 2024-06-19 PROCEDURE — 80061 LIPID PANEL: CPT

## 2024-08-23 ENCOUNTER — APPOINTMENT (OUTPATIENT)
Facility: HOSPITAL | Age: 69
End: 2024-08-23
Payer: MEDICARE

## 2024-08-23 ENCOUNTER — HOSPITAL ENCOUNTER (EMERGENCY)
Facility: HOSPITAL | Age: 69
Discharge: HOME | End: 2024-08-24
Attending: STUDENT IN AN ORGANIZED HEALTH CARE EDUCATION/TRAINING PROGRAM
Payer: MEDICARE

## 2024-08-23 VITALS
TEMPERATURE: 98.7 F | OXYGEN SATURATION: 99 % | HEART RATE: 75 BPM | SYSTOLIC BLOOD PRESSURE: 124 MMHG | WEIGHT: 235 LBS | RESPIRATION RATE: 20 BRPM | BODY MASS INDEX: 36.88 KG/M2 | DIASTOLIC BLOOD PRESSURE: 95 MMHG | HEIGHT: 67 IN

## 2024-08-23 DIAGNOSIS — R10.12 LEFT UPPER QUADRANT ABDOMINAL PAIN: Primary | ICD-10-CM

## 2024-08-23 LAB
ALBUMIN SERPL-MCNC: 3.7 G/DL (ref 3.4–5)
ALBUMIN/GLOB SERPL: 0.9 (ref 0.8–1.7)
ALP SERPL-CCNC: 160 U/L (ref 45–117)
ALT SERPL-CCNC: 26 U/L (ref 13–56)
ANION GAP SERPL CALC-SCNC: 9 MMOL/L (ref 3–18)
APPEARANCE UR: CLEAR
AST SERPL-CCNC: 13 U/L (ref 10–38)
BASOPHILS # BLD: 0 K/UL (ref 0–0.1)
BASOPHILS NFR BLD: 0 % (ref 0–2)
BILIRUB SERPL-MCNC: 0.2 MG/DL (ref 0.2–1)
BILIRUB UR QL: NEGATIVE
BUN SERPL-MCNC: 18 MG/DL (ref 7–18)
BUN/CREAT SERPL: 17 (ref 12–20)
CALCIUM SERPL-MCNC: 9.7 MG/DL (ref 8.5–10.1)
CHLORIDE SERPL-SCNC: 105 MMOL/L (ref 100–111)
CO2 SERPL-SCNC: 27 MMOL/L (ref 21–32)
COLOR UR: YELLOW
CREAT SERPL-MCNC: 1.03 MG/DL (ref 0.6–1.3)
DIFFERENTIAL METHOD BLD: ABNORMAL
EOSINOPHIL # BLD: 0 K/UL (ref 0–0.4)
EOSINOPHIL NFR BLD: 0 % (ref 0–5)
ERYTHROCYTE [DISTWIDTH] IN BLOOD BY AUTOMATED COUNT: 14.7 % (ref 11.6–14.5)
GLOBULIN SER CALC-MCNC: 4 G/DL (ref 2–4)
GLUCOSE SERPL-MCNC: 86 MG/DL (ref 74–99)
GLUCOSE UR STRIP.AUTO-MCNC: NEGATIVE MG/DL
HCT VFR BLD AUTO: 37.8 % (ref 35–45)
HGB BLD-MCNC: 12.1 G/DL (ref 12–16)
HGB UR QL STRIP: NEGATIVE
IMM GRANULOCYTES # BLD AUTO: 0 K/UL (ref 0–0.04)
IMM GRANULOCYTES NFR BLD AUTO: 0 % (ref 0–0.5)
KETONES UR QL STRIP.AUTO: NEGATIVE MG/DL
LEUKOCYTE ESTERASE UR QL STRIP.AUTO: NEGATIVE
LIPASE SERPL-CCNC: 72 U/L (ref 13–75)
LYMPHOCYTES # BLD: 3.4 K/UL (ref 0.9–3.6)
LYMPHOCYTES NFR BLD: 32 % (ref 21–52)
MAGNESIUM SERPL-MCNC: 1.3 MG/DL (ref 1.6–2.6)
MCH RBC QN AUTO: 28 PG (ref 24–34)
MCHC RBC AUTO-ENTMCNC: 32 G/DL (ref 31–37)
MCV RBC AUTO: 87.5 FL (ref 78–100)
MONOCYTES # BLD: 0.6 K/UL (ref 0.05–1.2)
MONOCYTES NFR BLD: 6 % (ref 3–10)
NEUTS SEG # BLD: 6.5 K/UL (ref 1.8–8)
NEUTS SEG NFR BLD: 61 % (ref 40–73)
NITRITE UR QL STRIP.AUTO: NEGATIVE
NRBC # BLD: 0 K/UL (ref 0–0.01)
NRBC BLD-RTO: 0 PER 100 WBC
PH UR STRIP: 5.5 (ref 5–8)
PLATELET # BLD AUTO: 262 K/UL (ref 135–420)
PMV BLD AUTO: 10.8 FL (ref 9.2–11.8)
POTASSIUM SERPL-SCNC: 3.6 MMOL/L (ref 3.5–5.5)
PROT SERPL-MCNC: 7.7 G/DL (ref 6.4–8.2)
PROT UR STRIP-MCNC: NEGATIVE MG/DL
RBC # BLD AUTO: 4.32 M/UL (ref 4.2–5.3)
SODIUM SERPL-SCNC: 141 MMOL/L (ref 136–145)
SP GR UR REFRACTOMETRY: 1.01 (ref 1–1.03)
TROPONIN I SERPL HS-MCNC: 6 NG/L (ref 0–54)
UROBILINOGEN UR QL STRIP.AUTO: 0.2 EU/DL (ref 0.2–1)
WBC # BLD AUTO: 10.6 K/UL (ref 4.6–13.2)

## 2024-08-23 PROCEDURE — 81003 URINALYSIS AUTO W/O SCOPE: CPT

## 2024-08-23 PROCEDURE — 6360000004 HC RX CONTRAST MEDICATION: Performed by: STUDENT IN AN ORGANIZED HEALTH CARE EDUCATION/TRAINING PROGRAM

## 2024-08-23 PROCEDURE — 84484 ASSAY OF TROPONIN QUANT: CPT

## 2024-08-23 PROCEDURE — 80053 COMPREHEN METABOLIC PANEL: CPT

## 2024-08-23 PROCEDURE — 96374 THER/PROPH/DIAG INJ IV PUSH: CPT

## 2024-08-23 PROCEDURE — 83690 ASSAY OF LIPASE: CPT

## 2024-08-23 PROCEDURE — 85025 COMPLETE CBC W/AUTO DIFF WBC: CPT

## 2024-08-23 PROCEDURE — 93005 ELECTROCARDIOGRAM TRACING: CPT | Performed by: EMERGENCY MEDICINE

## 2024-08-23 PROCEDURE — 6360000002 HC RX W HCPCS: Performed by: STUDENT IN AN ORGANIZED HEALTH CARE EDUCATION/TRAINING PROGRAM

## 2024-08-23 PROCEDURE — 74177 CT ABD & PELVIS W/CONTRAST: CPT

## 2024-08-23 PROCEDURE — 83735 ASSAY OF MAGNESIUM: CPT

## 2024-08-23 PROCEDURE — 99285 EMERGENCY DEPT VISIT HI MDM: CPT

## 2024-08-23 RX ORDER — MORPHINE SULFATE 10 MG/ML
8 INJECTION, SOLUTION INTRAMUSCULAR; INTRAVENOUS
Status: COMPLETED | OUTPATIENT
Start: 2024-08-23 | End: 2024-08-23

## 2024-08-23 RX ORDER — IOPAMIDOL 612 MG/ML
100 INJECTION, SOLUTION INTRAVASCULAR
Status: COMPLETED | OUTPATIENT
Start: 2024-08-23 | End: 2024-08-23

## 2024-08-23 RX ADMIN — MORPHINE SULFATE 8 MG: 10 INJECTION, SOLUTION INTRAMUSCULAR; INTRAVENOUS at 21:23

## 2024-08-23 RX ADMIN — IOPAMIDOL 85 ML: 612 INJECTION, SOLUTION INTRAVENOUS at 23:33

## 2024-08-23 ASSESSMENT — PAIN DESCRIPTION - LOCATION
LOCATION: ABDOMEN
LOCATION: ABDOMEN

## 2024-08-23 ASSESSMENT — PAIN - FUNCTIONAL ASSESSMENT: PAIN_FUNCTIONAL_ASSESSMENT: 0-10

## 2024-08-23 ASSESSMENT — PAIN DESCRIPTION - PAIN TYPE: TYPE: ACUTE PAIN

## 2024-08-23 ASSESSMENT — PAIN SCALES - GENERAL
PAINLEVEL_OUTOF10: 3
PAINLEVEL_OUTOF10: 7

## 2024-08-23 ASSESSMENT — PAIN DESCRIPTION - ORIENTATION
ORIENTATION: RIGHT
ORIENTATION: RIGHT

## 2024-08-23 NOTE — ED TRIAGE NOTES
Pt arrived to ED via EMS with c/o sudden onset of right sided abdominal pain.      EMS administered:  4 mg of Zofran IM  50 mcg's of Fentanyl intranasal    Pain went from 10/10 to 3/10    Pt has hx of DM: BS 94    Pt placed on monitor. Pain is currently 3/10. Provider at bedside. NAD at this time.

## 2024-08-24 LAB
EKG ATRIAL RATE: 59 BPM
EKG DIAGNOSIS: NORMAL
EKG P AXIS: 51 DEGREES
EKG P-R INTERVAL: 168 MS
EKG Q-T INTERVAL: 404 MS
EKG QRS DURATION: 90 MS
EKG QTC CALCULATION (BAZETT): 399 MS
EKG R AXIS: 80 DEGREES
EKG T AXIS: 69 DEGREES
EKG VENTRICULAR RATE: 59 BPM

## 2024-08-24 PROCEDURE — 93010 ELECTROCARDIOGRAM REPORT: CPT | Performed by: INTERNAL MEDICINE

## 2024-08-24 RX ORDER — DICYCLOMINE HCL 20 MG
20 TABLET ORAL 4 TIMES DAILY
Qty: 56 TABLET | Refills: 0 | Status: SHIPPED | OUTPATIENT
Start: 2024-08-24 | End: 2024-09-07

## 2024-08-24 NOTE — DISCHARGE INSTRUCTIONS
You were evaluated for abdominal pain .  Based on your work-up it was deemed that she was stable for discharge.  Please  your medication of bentyl which was prescribed to you.  Please follow-up with your primary care physician if you have any further concerns and go over your work-up.  If you experience any chest pain, shortness of breath, worsening abdominal pain, vomiting blood, worsening headache, seizures, or any worsening of your symptoms please return to the emergency department immediately.  If you have any pending results or any further questions please contact the emergency department at [phone].

## 2024-09-04 ENCOUNTER — HOSPITAL ENCOUNTER (OUTPATIENT)
Facility: HOSPITAL | Age: 69
Discharge: HOME | End: 2024-09-07
Payer: MEDICARE

## 2024-09-04 ENCOUNTER — TRANSCRIBE ORDERS (OUTPATIENT)
Facility: HOSPITAL | Age: 69
End: 2024-09-04

## 2024-09-04 DIAGNOSIS — Z01.818 PREOPERATIVE CLEARANCE: Primary | ICD-10-CM

## 2024-09-04 DIAGNOSIS — Z01.818 PREOPERATIVE CLEARANCE: ICD-10-CM

## 2024-09-04 LAB
ALBUMIN SERPL-MCNC: 3.6 G/DL (ref 3.4–5)
ALBUMIN/GLOB SERPL: 1 (ref 0.8–1.7)
ALP SERPL-CCNC: 147 U/L (ref 45–117)
ALT SERPL-CCNC: 20 U/L (ref 13–56)
ANION GAP SERPL CALC-SCNC: 5 MMOL/L (ref 3–18)
APPEARANCE UR: CLEAR
APTT PPP: 28.6 SEC (ref 23–36.4)
AST SERPL-CCNC: 10 U/L (ref 10–38)
BACTERIA URNS QL MICRO: NEGATIVE /HPF
BASOPHILS # BLD: 0 K/UL (ref 0–0.1)
BASOPHILS NFR BLD: 0 % (ref 0–2)
BILIRUB SERPL-MCNC: 0.4 MG/DL (ref 0.2–1)
BILIRUB UR QL: NEGATIVE
BUN SERPL-MCNC: 18 MG/DL (ref 7–18)
BUN/CREAT SERPL: 21 (ref 12–20)
CALCIUM SERPL-MCNC: 9.5 MG/DL (ref 8.5–10.1)
CHLORIDE SERPL-SCNC: 109 MMOL/L (ref 100–111)
CO2 SERPL-SCNC: 26 MMOL/L (ref 21–32)
COLOR UR: YELLOW
CREAT SERPL-MCNC: 0.84 MG/DL (ref 0.6–1.3)
DIFFERENTIAL METHOD BLD: ABNORMAL
EKG ATRIAL RATE: 61 BPM
EKG DIAGNOSIS: NORMAL
EKG P AXIS: 43 DEGREES
EKG P-R INTERVAL: 170 MS
EKG Q-T INTERVAL: 404 MS
EKG QRS DURATION: 86 MS
EKG QTC CALCULATION (BAZETT): 406 MS
EKG R AXIS: 52 DEGREES
EKG T AXIS: 63 DEGREES
EKG VENTRICULAR RATE: 61 BPM
EOSINOPHIL # BLD: 0 K/UL (ref 0–0.4)
EOSINOPHIL NFR BLD: 0 % (ref 0–5)
EPITH CASTS URNS QL MICRO: NORMAL /LPF (ref 0–5)
ERYTHROCYTE [DISTWIDTH] IN BLOOD BY AUTOMATED COUNT: 14.6 % (ref 11.6–14.5)
GLOBULIN SER CALC-MCNC: 3.5 G/DL (ref 2–4)
GLUCOSE SERPL-MCNC: 135 MG/DL (ref 74–99)
GLUCOSE UR STRIP.AUTO-MCNC: NEGATIVE MG/DL
HCT VFR BLD AUTO: 37.1 % (ref 35–45)
HGB BLD-MCNC: 11.9 G/DL (ref 12–16)
HGB UR QL STRIP: NEGATIVE
IMM GRANULOCYTES # BLD AUTO: 0 K/UL (ref 0–0.04)
IMM GRANULOCYTES NFR BLD AUTO: 0 % (ref 0–0.5)
INR PPP: 1 (ref 0.9–1.1)
KETONES UR QL STRIP.AUTO: NEGATIVE MG/DL
LEUKOCYTE ESTERASE UR QL STRIP.AUTO: NEGATIVE
LYMPHOCYTES # BLD: 3.1 K/UL (ref 0.9–3.6)
LYMPHOCYTES NFR BLD: 38 % (ref 21–52)
MCH RBC QN AUTO: 28.6 PG (ref 24–34)
MCHC RBC AUTO-ENTMCNC: 32.1 G/DL (ref 31–37)
MCV RBC AUTO: 89.2 FL (ref 78–100)
MONOCYTES # BLD: 0.5 K/UL (ref 0.05–1.2)
MONOCYTES NFR BLD: 7 % (ref 3–10)
NEUTS SEG # BLD: 4.4 K/UL (ref 1.8–8)
NEUTS SEG NFR BLD: 54 % (ref 40–73)
NITRITE UR QL STRIP.AUTO: NEGATIVE
NRBC # BLD: 0 K/UL (ref 0–0.01)
NRBC BLD-RTO: 0 PER 100 WBC
PH UR STRIP: 5.5 (ref 5–8)
PLATELET # BLD AUTO: 265 K/UL (ref 135–420)
PMV BLD AUTO: 11.2 FL (ref 9.2–11.8)
POTASSIUM SERPL-SCNC: 4.2 MMOL/L (ref 3.5–5.5)
PROT SERPL-MCNC: 7.1 G/DL (ref 6.4–8.2)
PROT UR STRIP-MCNC: NEGATIVE MG/DL
PROTHROMBIN TIME: 13.5 SEC (ref 11.9–14.9)
RBC # BLD AUTO: 4.16 M/UL (ref 4.2–5.3)
RBC #/AREA URNS HPF: NEGATIVE /HPF (ref 0–5)
SODIUM SERPL-SCNC: 140 MMOL/L (ref 136–145)
SP GR UR REFRACTOMETRY: 1.01 (ref 1–1.03)
UROBILINOGEN UR QL STRIP.AUTO: 0.2 EU/DL (ref 0.2–1)
WBC # BLD AUTO: 8 K/UL (ref 4.6–13.2)
WBC URNS QL MICRO: NORMAL /HPF (ref 0–4)

## 2024-09-04 PROCEDURE — 80053 COMPREHEN METABOLIC PANEL: CPT

## 2024-09-04 PROCEDURE — 93005 ELECTROCARDIOGRAM TRACING: CPT

## 2024-09-04 PROCEDURE — 85025 COMPLETE CBC W/AUTO DIFF WBC: CPT

## 2024-09-04 PROCEDURE — 36415 COLL VENOUS BLD VENIPUNCTURE: CPT

## 2024-09-04 PROCEDURE — 85610 PROTHROMBIN TIME: CPT

## 2024-09-04 PROCEDURE — 81001 URINALYSIS AUTO W/SCOPE: CPT

## 2024-09-04 PROCEDURE — 93010 ELECTROCARDIOGRAM REPORT: CPT | Performed by: INTERNAL MEDICINE

## 2024-09-04 PROCEDURE — 71046 X-RAY EXAM CHEST 2 VIEWS: CPT

## 2024-09-04 PROCEDURE — 85730 THROMBOPLASTIN TIME PARTIAL: CPT

## 2024-09-23 NOTE — ED PROVIDER NOTES
EMERGENCY DEPARTMENT HISTORY AND PHYSICAL EXAM    4:35 PM      Date: 8/23/2024  Patient Name: Chantelle Diggs    History of Presenting Illness     Chief Complaint   Patient presents with    Abdominal Pain       History From: Patient    Chantelle Diggs is a 69 y.o. female   HPI  69-year-old female with history of GERD, osteoarthritis, hyperlipidemia, diabetes, hypertension who presents with abdominal pain, nausea, vomiting.  Patient said the symptoms started today.  Pain left upper quadrant..  No aggravating alleviating factors.  She denies changes in meds, sick contacts, or any other changes.  When assessing ROS she denies any fevers, chest pain, shortness of breath, dysuria, change in bowel movement,  or any other changes.       Nursing Notes were all reviewed and agreed with or any disagreements were addressed in the HPI.    PCP: Shiloh Bianchi APRN - NP    No current facility-administered medications for this encounter.     Current Outpatient Medications   Medication Sig Dispense Refill    dicyclomine (BENTYL) 20 MG tablet Take 1 tablet by mouth 4 times daily for 14 days 56 tablet 0    linaclotide (LINZESS) 145 MCG capsule Take 1 capsule by mouth every morning (before breakfast)      tiZANidine (ZANAFLEX) 4 MG tablet Take 1 tablet by mouth every 6 hours as needed      traMADol (ULTRAM ER) 100 MG extended release tablet Take 1 tablet by mouth daily. (Patient not taking: Reported on 12/28/2023)      ibuprofen (ADVIL;MOTRIN) 800 MG tablet Take 1 tablet by mouth every 6 hours as needed for Pain      oxyCODONE-acetaminophen (PERCOCET)  MG per tablet Take 1 tablet by mouth 3 times daily.      hydrOXYzine HCl (ATARAX) 25 MG tablet Take 1 tablet by mouth every 8 hours as needed for Itching      aspirin 81 MG EC tablet Take by mouth daily      atorvastatin (LIPITOR) 40 MG tablet Take 0.5 tablets by mouth daily      glipiZIDE (GLUCOTROL) 10 MG tablet Take 1 tablet by mouth daily      losartan (COZAAR) 25 MG tablet TK 1

## 2024-10-11 ENCOUNTER — HOSPITAL ENCOUNTER (OUTPATIENT)
Facility: HOSPITAL | Age: 69
Setting detail: RECURRING SERIES
Discharge: HOME | End: 2024-10-14
Payer: MEDICARE

## 2024-10-11 PROCEDURE — 97162 PT EVAL MOD COMPLEX 30 MIN: CPT

## 2024-10-11 NOTE — PROGRESS NOTES
and certify that this therapy is necessary.    Physician's Signature:_________________________   DATE:_________   TIME:________                           Femi Renae MD    ** Signature, Date and Time must be completed for valid certification **  Please sign and return to [facility] [address]  [address]  [place] [address]  [phone] fax    
from skilled PT to address the above deficits and improve functional mobility so that she can perform ADLs, IADLs, ambulation, and recreational activities, such as fishing, with minimal pain or limitation.       Patient will continue to benefit from skilled PT services to modify and progress therapeutic interventions, analyze and address functional mobility deficits, analyze and address ROM deficits, analyze and address strength deficits, analyze and address soft tissue restrictions, analyze and cue for proper movement patterns, analyze and modify for postural abnormalities, analyze and address imbalance/dizziness, and instruct in home and community integration to address functional deficits and attain remaining goals.    [x]  See Plan of Care - for goals and assessment     PLAN  [x]  Upgrade activities as tolerated     [x]  Continue plan of care  [x]  Update interventions per flow sheet       []  Other:_      Genoveva Knapp, PT 10/11/2024  8:10 AM    If an interpreting service is utilized for treatment of this patient, the contents of this document represent the material reviewed with the patient via the .

## 2024-10-31 ENCOUNTER — APPOINTMENT (OUTPATIENT)
Facility: HOSPITAL | Age: 69
End: 2024-10-31
Payer: MEDICARE

## 2024-11-01 ENCOUNTER — APPOINTMENT (OUTPATIENT)
Facility: HOSPITAL | Age: 69
End: 2024-11-01
Payer: MEDICARE

## 2024-11-04 ENCOUNTER — HOSPITAL ENCOUNTER (OUTPATIENT)
Facility: HOSPITAL | Age: 69
Setting detail: RECURRING SERIES
Discharge: HOME | End: 2024-11-07
Payer: MEDICARE

## 2024-11-04 PROCEDURE — 97535 SELF CARE MNGMENT TRAINING: CPT

## 2024-11-04 PROCEDURE — 97112 NEUROMUSCULAR REEDUCATION: CPT

## 2024-11-04 PROCEDURE — 97110 THERAPEUTIC EXERCISES: CPT

## 2024-11-04 NOTE — PROGRESS NOTES
PHYSICAL / OCCUPATIONAL THERAPY - DAILY TREATMENT NOTE    Patient Name: Chantelle Diggs    Date: 2024    : 1955  Insurance: Payor: [employer] / Plan: [employer] MEDICARE-ADVANTAGE PPO / Product Type: Medicare /      Patient  verified Yes     Visit #   Current / Total 2 10   Time   In / Out 0730 0811   Pain   In / Out 7/10 8/10   Subjective Functional Status/Changes: \"The exercises at home made it worst, now my L side hurts as bad as the R\"     TREATMENT AREA =  Other low back pain [M54.59]     OBJECTIVE    Therapeutic Procedures:    Tx Min Billable or 1:1 Min (if diff from Tx Min) Procedure, Rationale, Specifics   15 15 38744 Therapeutic Exercise (timed):  increase ROM, strength, coordination, balance, and proprioception to improve patient's ability to progress to PLOF and address remaining functional goals. (see flow sheet as applicable)     Details if applicable:       10 10 82222 Self Care/Home Management (timed):  improve patient knowledge and understanding of home injury/symptom/pain management, positioning, posture/ergonomics, home safety, and activity modification  to improve patient's ability to progress to PLOF and address remaining functional goals.  (see flow sheet as applicable)     Details if applicable:  pt ed, symptom modulation   15 15 28238 Neuromuscular Re-Education (timed):  improve balance, coordination, kinesthetic sense, posture, core stability and proprioception to improve patient's ability to develop conscious control of individual muscles and awareness of position of extremities in order to progress to PLOF and address remaining functional goals. (see flow sheet as applicable)     Details if applicable:  core re-ed, diaphragmatic breathing    40 40 MC BC Totals Reminder: bill using total billable min of TIMED therapeutic procedures (example: do not include dry needle or estim unattended, both untimed codes, in totals to left)  8-22 min = 1 unit; 23-37 min = 2 units; 38-52 min =

## 2024-11-08 ENCOUNTER — HOSPITAL ENCOUNTER (OUTPATIENT)
Facility: HOSPITAL | Age: 69
Setting detail: RECURRING SERIES
Discharge: HOME | End: 2024-11-11
Payer: MEDICARE

## 2024-11-08 PROCEDURE — 97535 SELF CARE MNGMENT TRAINING: CPT

## 2024-11-08 PROCEDURE — 97110 THERAPEUTIC EXERCISES: CPT

## 2024-11-08 PROCEDURE — 97530 THERAPEUTIC ACTIVITIES: CPT

## 2024-11-08 NOTE — PROGRESS NOTES
PHYSICAL / OCCUPATIONAL THERAPY - DAILY TREATMENT NOTE    Patient Name: Chantelle Diggs    Date: 2024    : 1955  Insurance: Payor: [employer]ARE / Plan: [employer] MEDICARE-ADVANTAGE PPO / Product Type: Medicare /      Patient  verified Yes     Visit #   Current / Total 3 10   Time   In / Out 0731 0812   Pain   In / Out 8/10 8/10   Subjective Functional Status/Changes: \"I'm hurting today, the pain is in my left leg now too.\"     TREATMENT AREA =  Other low back pain [M54.59]     OBJECTIVE    Therapeutic Procedures:    Tx Min Billable or 1:1 Min (if diff from Tx Min) Procedure, Rationale, Specifics   17 17 75267 Therapeutic Activity (timed):  use of dynamic activities replicating functional movements to increase ROM, strength, coordination, balance, and proprioception in order to improve patient's ability to progress to PLOF and address remaining functional goals.  (see flow sheet as applicable)     Details if applicable:  progress update     15 15 57680 Therapeutic Exercise (timed):  increase ROM, strength, coordination, balance, and proprioception to improve patient's ability to progress to PLOF and address remaining functional goals. (see flow sheet as applicable)     Details if applicable:     8 8 33586 Self Care/Home Management (timed):  improve patient knowledge and understanding of home injury/symptom/pain management, positioning, posture/ergonomics, home safety, and activity modification  to improve patient's ability to progress to PLOF and address remaining functional goals.  (see flow sheet as applicable)     Details if applicable:  pt ed, POC   40 40 MC BC Totals Reminder: bill using total billable min of TIMED therapeutic procedures (example: do not include dry needle or estim unattended, both untimed codes, in totals to left)  8-22 min = 1 unit; 23-37 min = 2 units; 38-52 min = 3 units; 53-67 min = 4 units; 68-82 min = 5 units   Total Total     [x]  Patient Education billed concurrently with 
improve patient's ability to ambulate and perform tranfsers at home & community.   RC: Hip flex R 4/5 p!, L 4+/5; knee ext 4/5 p! B, knee flex 4+/5 p! B    Pt will be able to improve strength in B hip abd to at least 4+/5 to improve patient's ability to ambulate without compensations at home & community.     RC: L 4-/5, R 3+/5 (11/08/24)     Pt will be able to improve TUG score to </= 18 sec with SPC to improve ability to perform transfers and ambulation and to decrease fall risk.    RC: 34 sec with SPC, antalgic (11/08/24)     Pt will report compliance with home HEP at end of care to enhance carry over of fuctional gains made with skilled therapy services in order to improve quality of life.    RC: reports compliance with current exercises, although painful (11/08/24)     Pt will improve MADHAV score to </= 65% to demonstrate improvement in patient's ability to perform unrestricted ADLs/IADLs at home & community.   RC: 88% (11/08/24)     Frequency / Duration:   Patient to be seen   1-2   times per week for   5    weeks:  Frequency / Duration: Patient would benefit from skilled PT 1-2 times per week for 24 VISITS sessions as needed in this certification period.  Goals will be assigned and reassessed every 10 visits/ 30 days per Medicare guidelines    Assessments/Recommendations: Patient is a pleasant 69 year old female with c/o LBP and radiating sx into BLE, R >> L. Patient has completed x3 visits of skilled PT to address, reporting 0% improvement since SOC. Patient reports no functional gains. Reports functional deficits including walking, sitting, standing, household chores, and washing the dishes. Objectively, patient presents with slight improvements in B knee flex strength, otherwise no strength changes since IE. Regressions in MADHAV and TUG scores. Progress limited by only x2 f/u visits completed since IE. Patient will continue to benefit from skilled PT to address the remaining deficits and improve functional

## 2024-11-11 ENCOUNTER — HOSPITAL ENCOUNTER (OUTPATIENT)
Facility: HOSPITAL | Age: 69
Setting detail: RECURRING SERIES
Discharge: HOME | End: 2024-11-14
Payer: MEDICARE

## 2024-11-11 PROCEDURE — G0283 ELEC STIM OTHER THAN WOUND: HCPCS

## 2024-11-11 PROCEDURE — 97110 THERAPEUTIC EXERCISES: CPT

## 2024-11-11 PROCEDURE — 97112 NEUROMUSCULAR REEDUCATION: CPT

## 2024-11-11 NOTE — PROGRESS NOTES
be able to improve TUG score to </= 18 sec with SPC to improve ability to perform transfers and ambulation and to decrease fall risk.               RC: 34 sec with SPC, antalgic (11/08/24)      Pt will report compliance with home HEP at end of care to enhance carry over of fuctional gains made with skilled therapy services in order to improve quality of life.    RC: reports compliance with current exercises, although painful (11/08/24)    Reports semi-compliance with exercises, but limited; lays or sits most of the day 2/2 pain (11/11/24)     Pt will improve MADHAV score to </= 65% to demonstrate improvement in patient's ability to perform unrestricted ADLs/IADLs at home & community.              RC: 88% (11/08/24)     Next PN/ RC due 12/07/24  Auth due (visit number/ date) JAZMINE    PLAN  - Continue Plan of Care    Genoveva Knapp, PT    11/11/2024    7:35 AM  If an interpreting service was utilized for treatment of this patient, the contents of this document represent the material reviewed with the patient via the .     Future Appointments   Date Time Provider Department Center   11/15/2024  7:30 AM Genoveva Knapp, PT [facility]

## 2024-11-15 ENCOUNTER — HOSPITAL ENCOUNTER (OUTPATIENT)
Facility: HOSPITAL | Age: 69
Setting detail: RECURRING SERIES
Discharge: HOME | End: 2024-11-18
Payer: MEDICARE

## 2024-11-15 PROCEDURE — G0283 ELEC STIM OTHER THAN WOUND: HCPCS

## 2024-11-15 PROCEDURE — 97112 NEUROMUSCULAR REEDUCATION: CPT

## 2024-11-15 PROCEDURE — 97110 THERAPEUTIC EXERCISES: CPT

## 2024-11-15 NOTE — PROGRESS NOTES
[facility] [address]  [address]  [place] [address]  [phone] fax    DISCHARGE SUMMARY  Patient Name: Chantelle Diggs : 1955   Treatment/Medical Diagnosis: Other low back pain [M54.59]   Referral Source: Femi Renae MD     Date of Initial Visit: 10/11/24 Attended Visits: 5 Missed Visits: 0     SUMMARY OF TREATMENT  Patient is a pleasant 69 year old female with c/o chronic LBP. Patient has completed x5 visits of skilled PT to address, reporting 0% improvement since SOC. Patient reports no functional gains since IE. Reports chronic, constant pain with minimal relief; worsening with PT exercises. Will d/c from skilled PT at this time, per patient request as she has failed to progress in pain or function. All questions addressed; patient verbalized understanding and agreement with d/c rec. Patient instructed to call with any questions/concerns and to notify MD of any status changes.     CURRENT STATUS  Functional Gains: None  Functional Deficits: walking, sitting, standing, chores, washing dishes  % improvement: 0%  Pain   Average: 7-8/10       Best: 6/10     Worst: 9/10  Patient Goal: \"More mobility without pain\"    Pt will be able to report a </= 6/10 pain rating at worst to improve patient's ability to tolerate prolonged functional activities at home.               RC: 9/10 at worst (24)     NOT MET: 9/10 at worst (11/15/24)     Pt will be able to improve strength in R hip flex, R knee flex/ext to at least 5/5 to improve patient's ability to ambulate and perform tranfsers at home & community.   RC: Hip flex R 4/5 p!, L 4+/5; knee ext 4/5 p! B, knee flex 4+/5 p! B  NOT MET: no change (11/15/24)     Pt will be able to improve strength in B hip abd to at least 4+/5 to improve patient's ability to ambulate without compensations at home & community.               RC: L 4-/5, R 3+/5 (24)     NOT MET: no change (11/15/24)     Pt will be able to 
units; 68-82 min = 5 units   Total Total     [x]  Patient Education billed concurrently with other procedures   [x] Review HEP    [] Progressed/Changed HEP, detail:    [] Other detail:       Objective Information/Functional Measures/Assessment    Patient is a pleasant 69 year old female with c/o chronic LBP. Patient has completed x5 visits of skilled PT to address, reporting 0% improvement since SOC. Patient reports no functional gains since IE. Reports chronic, constant pain with minimal relief; worsening with PT exercises. Will d/c from skilled PT at this time, per patient request as she has failed to progress in pain or function. All questions addressed; patient verbalized understanding and agreement with d/c rec. Patient instructed to call with any questions/concerns and to notify MD of any status changes.       Progress toward goals / Updated goals:  []  See Progress Note/Recertification    Pt will be able to report a </= 6/10 pain rating at worst to improve patient's ability to tolerate prolonged functional activities at home.               RC: 9/10 at worst (11/08/24)                NOT MET: 9/10 at worst (11/15/24)      Pt will be able to improve strength in R hip flex, R knee flex/ext to at least 5/5 to improve patient's ability to ambulate and perform tranfsers at home & community.   RC: Hip flex R 4/5 p!, L 4+/5; knee ext 4/5 p! B, knee flex 4+/5 p! B  NOT MET: no change (11/15/24)      Pt will be able to improve strength in B hip abd to at least 4+/5 to improve patient's ability to ambulate without compensations at home & community.               RC: L 4-/5, R 3+/5 (11/08/24)                NOT MET: no change (11/15/24)      Pt will be able to improve TUG score to </= 18 sec with SPC to improve ability to perform transfers and ambulation and to decrease fall risk.              RC: 34 sec with SPC, antalgic (11/08/24)                NOT MET: not assessed at d/c (11/15/24)      Pt will report compliance

## 2024-12-13 ENCOUNTER — TRANSCRIBE ORDERS (OUTPATIENT)
Facility: HOSPITAL | Age: 69
End: 2024-12-13

## 2024-12-13 DIAGNOSIS — Z12.31 BREAST CANCER SCREENING BY MAMMOGRAM: Primary | ICD-10-CM

## 2024-12-18 ENCOUNTER — TRANSCRIBE ORDERS (OUTPATIENT)
Facility: HOSPITAL | Age: 69
End: 2024-12-18

## 2024-12-18 ENCOUNTER — HOSPITAL ENCOUNTER (OUTPATIENT)
Facility: HOSPITAL | Age: 69
Discharge: HOME | End: 2024-12-21
Payer: MEDICARE

## 2024-12-18 DIAGNOSIS — R53.83 OTHER FATIGUE: ICD-10-CM

## 2024-12-18 DIAGNOSIS — E87.70 HYPERVOLEMIA, UNSPECIFIED HYPERVOLEMIA TYPE: ICD-10-CM

## 2024-12-18 DIAGNOSIS — R63.5 ABNORMAL WEIGHT GAIN: ICD-10-CM

## 2024-12-18 DIAGNOSIS — E78.2 MIXED HYPERLIPIDEMIA: Primary | ICD-10-CM

## 2024-12-18 DIAGNOSIS — E78.2 MIXED HYPERLIPIDEMIA: ICD-10-CM

## 2024-12-18 DIAGNOSIS — I10 ESSENTIAL HYPERTENSION, MALIGNANT: ICD-10-CM

## 2024-12-18 LAB
ALBUMIN SERPL-MCNC: 3.8 G/DL (ref 3.4–5)
ALBUMIN/GLOB SERPL: 0.9 (ref 0.8–1.7)
ALP SERPL-CCNC: 171 U/L (ref 45–117)
ALT SERPL-CCNC: 22 U/L (ref 13–56)
ANION GAP SERPL CALC-SCNC: 5 MMOL/L (ref 3–18)
AST SERPL-CCNC: 12 U/L (ref 10–38)
BASOPHILS # BLD: 0 K/UL (ref 0–0.1)
BASOPHILS NFR BLD: 0 % (ref 0–2)
BILIRUB SERPL-MCNC: 0.5 MG/DL (ref 0.2–1)
BUN SERPL-MCNC: 17 MG/DL (ref 7–18)
BUN/CREAT SERPL: 20 (ref 12–20)
CALCIUM SERPL-MCNC: 9.6 MG/DL (ref 8.5–10.1)
CHLORIDE SERPL-SCNC: 106 MMOL/L (ref 100–111)
CHOLEST SERPL-MCNC: 169 MG/DL
CO2 SERPL-SCNC: 27 MMOL/L (ref 21–32)
CREAT SERPL-MCNC: 0.87 MG/DL (ref 0.6–1.3)
DIFFERENTIAL METHOD BLD: ABNORMAL
EOSINOPHIL # BLD: 0 K/UL (ref 0–0.4)
EOSINOPHIL NFR BLD: 0 % (ref 0–5)
ERYTHROCYTE [DISTWIDTH] IN BLOOD BY AUTOMATED COUNT: 13.3 % (ref 11.6–14.5)
GLOBULIN SER CALC-MCNC: 4.1 G/DL (ref 2–4)
GLUCOSE SERPL-MCNC: 203 MG/DL (ref 74–99)
HCT VFR BLD AUTO: 39.8 % (ref 35–45)
HDLC SERPL-MCNC: 66 MG/DL (ref 40–60)
HDLC SERPL: 2.6 (ref 0–5)
HGB BLD-MCNC: 12.7 G/DL (ref 12–16)
IMM GRANULOCYTES # BLD AUTO: 0 K/UL (ref 0–0.04)
IMM GRANULOCYTES NFR BLD AUTO: 0 % (ref 0–0.5)
LDLC SERPL CALC-MCNC: 80.4 MG/DL (ref 0–100)
LIPID PANEL: ABNORMAL
LYMPHOCYTES # BLD: 3.5 K/UL (ref 0.9–3.6)
LYMPHOCYTES NFR BLD: 41 % (ref 21–52)
MAGNESIUM SERPL-MCNC: 1.7 MG/DL (ref 1.6–2.6)
MCH RBC QN AUTO: 27.9 PG (ref 24–34)
MCHC RBC AUTO-ENTMCNC: 31.9 G/DL (ref 31–37)
MCV RBC AUTO: 87.3 FL (ref 78–100)
MONOCYTES # BLD: 0.6 K/UL (ref 0.05–1.2)
MONOCYTES NFR BLD: 7 % (ref 3–10)
NEUTS SEG # BLD: 4.2 K/UL (ref 1.8–8)
NEUTS SEG NFR BLD: 51 % (ref 40–73)
NRBC # BLD: 0 K/UL (ref 0–0.01)
NRBC BLD-RTO: 0 PER 100 WBC
PLATELET # BLD AUTO: 266 K/UL (ref 135–420)
PMV BLD AUTO: 11.9 FL (ref 9.2–11.8)
POTASSIUM SERPL-SCNC: 4.2 MMOL/L (ref 3.5–5.5)
PROT SERPL-MCNC: 7.9 G/DL (ref 6.4–8.2)
RBC # BLD AUTO: 4.56 M/UL (ref 4.2–5.3)
SODIUM SERPL-SCNC: 138 MMOL/L (ref 136–145)
T4 FREE SERPL-MCNC: 1 NG/DL (ref 0.7–1.5)
TRIGL SERPL-MCNC: 113 MG/DL
TSH SERPL DL<=0.05 MIU/L-ACNC: 1.28 UIU/ML (ref 0.36–3.74)
VIT B12 SERPL-MCNC: 656 PG/ML (ref 211–911)
VLDLC SERPL CALC-MCNC: 22.6 MG/DL
WBC # BLD AUTO: 8.3 K/UL (ref 4.6–13.2)

## 2024-12-18 PROCEDURE — 80061 LIPID PANEL: CPT

## 2024-12-18 PROCEDURE — 84439 ASSAY OF FREE THYROXINE: CPT

## 2024-12-18 PROCEDURE — 82607 VITAMIN B-12: CPT

## 2024-12-18 PROCEDURE — 83735 ASSAY OF MAGNESIUM: CPT

## 2024-12-18 PROCEDURE — 84443 ASSAY THYROID STIM HORMONE: CPT

## 2024-12-18 PROCEDURE — 85025 COMPLETE CBC W/AUTO DIFF WBC: CPT

## 2024-12-18 PROCEDURE — 80053 COMPREHEN METABOLIC PANEL: CPT

## 2024-12-18 PROCEDURE — 36415 COLL VENOUS BLD VENIPUNCTURE: CPT

## 2024-12-27 ENCOUNTER — HOSPITAL ENCOUNTER (OUTPATIENT)
Facility: HOSPITAL | Age: 69
Discharge: HOME | End: 2024-12-30
Payer: MEDICARE

## 2024-12-27 ENCOUNTER — HOSPITAL ENCOUNTER (OUTPATIENT)
Facility: HOSPITAL | Age: 69
Discharge: HOME | End: 2024-12-29
Payer: MEDICARE

## 2024-12-27 VITALS
HEIGHT: 67 IN | HEART RATE: 57 BPM | WEIGHT: 241 LBS | SYSTOLIC BLOOD PRESSURE: 129 MMHG | BODY MASS INDEX: 37.83 KG/M2 | DIASTOLIC BLOOD PRESSURE: 64 MMHG

## 2024-12-27 DIAGNOSIS — R06.09 DYSPNEA ON EXERTION: ICD-10-CM

## 2024-12-27 LAB
ECHO BSA: 2.27 M2
NUC STRESS EJECTION FRACTION: 77 %
STRESS BASELINE DIAS BP: 64 MMHG
STRESS BASELINE HR: 56 BPM
STRESS BASELINE ST DEPRESSION: 0 MM
STRESS BASELINE SYS BP: 129 MMHG
STRESS ESTIMATED WORKLOAD: 1 METS
STRESS PEAK DIAS BP: 63 MMHG
STRESS PEAK SYS BP: 133 MMHG
STRESS PERCENT HR ACHIEVED: 55 %
STRESS POST PEAK HR: 84 BPM
STRESS RATE PRESSURE PRODUCT: NORMAL BPM*MMHG
STRESS ST DEPRESSION: 0 MM
STRESS TARGET HR: 153 BPM
TID: 1.1

## 2024-12-27 PROCEDURE — 93018 CV STRESS TEST I&R ONLY: CPT | Performed by: INTERNAL MEDICINE

## 2024-12-27 PROCEDURE — 93016 CV STRESS TEST SUPVJ ONLY: CPT | Performed by: INTERNAL MEDICINE

## 2024-12-27 PROCEDURE — 3430000000 HC RX DIAGNOSTIC RADIOPHARMACEUTICAL: Performed by: NURSE PRACTITIONER

## 2024-12-27 PROCEDURE — 93017 CV STRESS TEST TRACING ONLY: CPT

## 2024-12-27 PROCEDURE — 78452 HT MUSCLE IMAGE SPECT MULT: CPT | Performed by: INTERNAL MEDICINE

## 2024-12-27 PROCEDURE — A9502 TC99M TETROFOSMIN: HCPCS | Performed by: NURSE PRACTITIONER

## 2024-12-27 PROCEDURE — 6360000002 HC RX W HCPCS: Performed by: NURSE PRACTITIONER

## 2024-12-27 RX ORDER — REGADENOSON 0.08 MG/ML
0.4 INJECTION, SOLUTION INTRAVENOUS
Status: COMPLETED | OUTPATIENT
Start: 2024-12-27 | End: 2024-12-27

## 2024-12-27 RX ADMIN — TETROFOSMIN 33 MILLICURIE: 1.38 INJECTION, POWDER, LYOPHILIZED, FOR SOLUTION INTRAVENOUS at 08:36

## 2024-12-27 RX ADMIN — REGADENOSON 0.4 MG: 0.08 INJECTION, SOLUTION INTRAVENOUS at 08:36

## 2024-12-27 RX ADMIN — TETROFOSMIN 11 MILLICURIE: 1.38 INJECTION, POWDER, LYOPHILIZED, FOR SOLUTION INTRAVENOUS at 06:45

## 2025-01-16 ENCOUNTER — HOSPITAL ENCOUNTER (OUTPATIENT)
Facility: HOSPITAL | Age: 70
Discharge: HOME | End: 2025-01-19
Payer: MEDICARE

## 2025-01-16 VITALS — HEIGHT: 60 IN | BODY MASS INDEX: 46.33 KG/M2 | WEIGHT: 236 LBS

## 2025-01-16 DIAGNOSIS — Z12.31 BREAST CANCER SCREENING BY MAMMOGRAM: ICD-10-CM

## 2025-01-16 PROCEDURE — 77063 BREAST TOMOSYNTHESIS BI: CPT

## 2025-02-24 ENCOUNTER — TRANSCRIBE ORDERS (OUTPATIENT)
Facility: HOSPITAL | Age: 70
End: 2025-02-24

## 2025-02-24 DIAGNOSIS — M54.16 LUMBAR RADICULOPATHY: Primary | ICD-10-CM

## 2025-03-05 ENCOUNTER — HOSPITAL ENCOUNTER (OUTPATIENT)
Facility: HOSPITAL | Age: 70
Discharge: HOME | End: 2025-03-08
Payer: MEDICARE

## 2025-03-05 DIAGNOSIS — M54.16 LUMBAR RADICULOPATHY: ICD-10-CM

## 2025-03-05 PROCEDURE — 72148 MRI LUMBAR SPINE W/O DYE: CPT

## 2025-04-02 ENCOUNTER — HOSPITAL ENCOUNTER (OUTPATIENT)
Facility: HOSPITAL | Age: 70
Discharge: HOME | End: 2025-04-04
Payer: MEDICARE

## 2025-04-02 VITALS
WEIGHT: 236 LBS | HEIGHT: 67 IN | SYSTOLIC BLOOD PRESSURE: 129 MMHG | BODY MASS INDEX: 37.04 KG/M2 | DIASTOLIC BLOOD PRESSURE: 64 MMHG

## 2025-04-02 DIAGNOSIS — R06.09 DYSPNEA ON EXERTION: ICD-10-CM

## 2025-04-02 PROCEDURE — 93306 TTE W/DOPPLER COMPLETE: CPT

## 2025-04-03 LAB
ECHO AO ASC DIAM: 3.2 CM
ECHO AO ASCENDING AORTA INDEX: 1.47 CM/M2
ECHO AO ROOT DIAM: 3 CM
ECHO AO ROOT INDEX: 1.38 CM/M2
ECHO AV AREA PEAK VELOCITY: 3.5 CM2
ECHO AV AREA VTI: 3.9 CM2
ECHO AV AREA/BSA PEAK VELOCITY: 1.6 CM2/M2
ECHO AV AREA/BSA VTI: 1.8 CM2/M2
ECHO AV MEAN GRADIENT: 3 MMHG
ECHO AV MEAN VELOCITY: 0.8 M/S
ECHO AV PEAK GRADIENT: 5 MMHG
ECHO AV PEAK VELOCITY: 1.1 M/S
ECHO AV VELOCITY RATIO: 0.91
ECHO AV VTI: 24.3 CM
ECHO BSA: 2.25 M2
ECHO EST RA PRESSURE: 3 MMHG
ECHO LA VOL A-L A2C: 51 ML (ref 22–52)
ECHO LA VOL A-L A4C: 37 ML (ref 22–52)
ECHO LA VOL BP: 43 ML (ref 22–52)
ECHO LA VOL MOD A2C: 50 ML (ref 22–52)
ECHO LA VOL MOD A4C: 34 ML (ref 22–52)
ECHO LA VOL/BSA BIPLANE: 20 ML/M2 (ref 16–34)
ECHO LA VOLUME AREA LENGTH: 46 ML
ECHO LA VOLUME INDEX A-L A2C: 24 ML/M2 (ref 16–34)
ECHO LA VOLUME INDEX A-L A4C: 17 ML/M2 (ref 16–34)
ECHO LA VOLUME INDEX AREA LENGTH: 21 ML/M2 (ref 16–34)
ECHO LA VOLUME INDEX MOD A2C: 23 ML/M2 (ref 16–34)
ECHO LA VOLUME INDEX MOD A4C: 16 ML/M2 (ref 16–34)
ECHO LV E' LATERAL VELOCITY: 10.91 CM/S
ECHO LV E' SEPTAL VELOCITY: 7.03 CM/S
ECHO LV EDV A2C: 77 ML
ECHO LV EDV A4C: 88 ML
ECHO LV EDV BP: 84 ML (ref 56–104)
ECHO LV EDV INDEX A4C: 41 ML/M2
ECHO LV EDV INDEX BP: 39 ML/M2
ECHO LV EDV NDEX A2C: 35 ML/M2
ECHO LV EF PHYSICIAN: 60 %
ECHO LV EJECTION FRACTION A2C: 59 %
ECHO LV EJECTION FRACTION A4C: 69 %
ECHO LV EJECTION FRACTION BIPLANE: 65 % (ref 55–100)
ECHO LV ESV A2C: 31 ML
ECHO LV ESV A4C: 27 ML
ECHO LV ESV BP: 30 ML (ref 19–49)
ECHO LV ESV INDEX A2C: 14 ML/M2
ECHO LV ESV INDEX A4C: 12 ML/M2
ECHO LV ESV INDEX BP: 14 ML/M2
ECHO LV FRACTIONAL SHORTENING: 32 % (ref 28–44)
ECHO LV INTERNAL DIMENSION DIASTOLE INDEX: 1.89 CM/M2
ECHO LV INTERNAL DIMENSION DIASTOLIC: 4.1 CM (ref 3.9–5.3)
ECHO LV INTERNAL DIMENSION SYSTOLIC INDEX: 1.29 CM/M2
ECHO LV INTERNAL DIMENSION SYSTOLIC: 2.8 CM
ECHO LV IVSD: 0.9 CM (ref 0.6–0.9)
ECHO LV MASS 2D: 89.4 G (ref 67–162)
ECHO LV MASS INDEX 2D: 41.2 G/M2 (ref 43–95)
ECHO LV POSTERIOR WALL DIASTOLIC: 0.6 CM (ref 0.6–0.9)
ECHO LV RELATIVE WALL THICKNESS RATIO: 0.29
ECHO LVOT AREA: 4.2 CM2
ECHO LVOT AV VTI INDEX: 0.96
ECHO LVOT DIAM: 2.3 CM
ECHO LVOT MEAN GRADIENT: 2 MMHG
ECHO LVOT PEAK GRADIENT: 4 MMHG
ECHO LVOT PEAK VELOCITY: 1 M/S
ECHO LVOT STROKE VOLUME INDEX: 44.8 ML/M2
ECHO LVOT SV: 97.2 ML
ECHO LVOT VTI: 23.4 CM
ECHO MV A VELOCITY: 1.04 M/S
ECHO MV E DECELERATION TIME (DT): 197.9 MS
ECHO MV E VELOCITY: 0.7 M/S
ECHO MV E/A RATIO: 0.67
ECHO MV E/E' LATERAL: 6.42
ECHO MV E/E' RATIO (AVERAGED): 8.19
ECHO MV E/E' SEPTAL: 9.96
ECHO PV MAX VELOCITY: 0.9 M/S
ECHO PV PEAK GRADIENT: 3 MMHG
ECHO RIGHT VENTRICULAR SYSTOLIC PRESSURE (RVSP): 28 MMHG
ECHO RV FREE WALL PEAK S': 12.9 CM/S
ECHO RV TAPSE: 2.6 CM (ref 1.7–?)
ECHO TV REGURGITANT MAX VELOCITY: 2.51 M/S
ECHO TV REGURGITANT PEAK GRADIENT: 25 MMHG

## 2025-07-20 ENCOUNTER — HOSPITAL ENCOUNTER (EMERGENCY)
Facility: HOSPITAL | Age: 70
Discharge: HOME | End: 2025-07-20
Payer: MEDICARE

## 2025-07-20 ENCOUNTER — APPOINTMENT (OUTPATIENT)
Facility: HOSPITAL | Age: 70
End: 2025-07-20
Payer: MEDICARE

## 2025-07-20 VITALS
DIASTOLIC BLOOD PRESSURE: 75 MMHG | OXYGEN SATURATION: 100 % | BODY MASS INDEX: 32.65 KG/M2 | RESPIRATION RATE: 15 BRPM | HEIGHT: 67 IN | SYSTOLIC BLOOD PRESSURE: 150 MMHG | TEMPERATURE: 97.9 F | HEART RATE: 75 BPM | WEIGHT: 208 LBS

## 2025-07-20 DIAGNOSIS — R10.13 EPIGASTRIC PAIN: ICD-10-CM

## 2025-07-20 DIAGNOSIS — R11.2 NAUSEA AND VOMITING, UNSPECIFIED VOMITING TYPE: Primary | ICD-10-CM

## 2025-07-20 DIAGNOSIS — E87.6 HYPOKALEMIA: ICD-10-CM

## 2025-07-20 DIAGNOSIS — R91.1 LUNG NODULE: ICD-10-CM

## 2025-07-20 DIAGNOSIS — Z98.890 STATUS POST SPINAL SURGERY: ICD-10-CM

## 2025-07-20 LAB
ALBUMIN SERPL-MCNC: 3.4 G/DL (ref 3.4–5)
ALBUMIN/GLOB SERPL: 0.8 (ref 0.8–1.7)
ALP SERPL-CCNC: 187 U/L (ref 45–117)
ALT SERPL-CCNC: 12 U/L (ref 10–35)
ANION GAP SERPL CALC-SCNC: 14 MMOL/L (ref 3–18)
APPEARANCE UR: CLEAR
AST SERPL-CCNC: 17 U/L (ref 10–38)
BASOPHILS # BLD: 0 K/UL (ref 0–0.1)
BASOPHILS NFR BLD: 0 % (ref 0–2)
BILIRUB SERPL-MCNC: 0.3 MG/DL (ref 0.2–1)
BILIRUB UR QL: NEGATIVE
BUN SERPL-MCNC: 6 MG/DL (ref 6–23)
BUN/CREAT SERPL: 9 (ref 12–20)
CALCIUM SERPL-MCNC: 10.5 MG/DL (ref 8.5–10.1)
CHLORIDE SERPL-SCNC: 107 MMOL/L (ref 98–107)
CO2 SERPL-SCNC: 23 MMOL/L (ref 21–32)
COLOR UR: YELLOW
CREAT SERPL-MCNC: 0.64 MG/DL (ref 0.6–1.3)
DIFFERENTIAL METHOD BLD: ABNORMAL
EKG ATRIAL RATE: 85 BPM
EKG DIAGNOSIS: NORMAL
EKG P AXIS: 58 DEGREES
EKG P-R INTERVAL: 146 MS
EKG Q-T INTERVAL: 362 MS
EKG QRS DURATION: 84 MS
EKG QTC CALCULATION (BAZETT): 430 MS
EKG R AXIS: 63 DEGREES
EKG T AXIS: 63 DEGREES
EKG VENTRICULAR RATE: 85 BPM
EOSINOPHIL # BLD: 0.03 K/UL (ref 0–0.4)
EOSINOPHIL NFR BLD: 0.5 % (ref 0–5)
ERYTHROCYTE [DISTWIDTH] IN BLOOD BY AUTOMATED COUNT: 15.2 % (ref 11.6–14.5)
GLOBULIN SER CALC-MCNC: 4.1 G/DL (ref 2–4)
GLUCOSE BLD STRIP.AUTO-MCNC: 208 MG/DL (ref 70–110)
GLUCOSE SERPL-MCNC: 181 MG/DL (ref 74–108)
GLUCOSE UR STRIP.AUTO-MCNC: NEGATIVE MG/DL
HCT VFR BLD AUTO: 37.3 % (ref 35–45)
HGB BLD-MCNC: 11.5 G/DL (ref 12–16)
HGB UR QL STRIP: NEGATIVE
IMM GRANULOCYTES # BLD AUTO: 0.01 K/UL (ref 0–0.04)
IMM GRANULOCYTES NFR BLD AUTO: 0.2 % (ref 0–0.5)
KETONES UR QL STRIP.AUTO: NEGATIVE MG/DL
LEUKOCYTE ESTERASE UR QL STRIP.AUTO: NEGATIVE
LIPASE SERPL-CCNC: 45 U/L (ref 13–75)
LYMPHOCYTES # BLD: 2.48 K/UL (ref 0.9–3.6)
LYMPHOCYTES NFR BLD: 38.8 % (ref 21–52)
MCH RBC QN AUTO: 25.5 PG (ref 24–34)
MCHC RBC AUTO-ENTMCNC: 30.8 G/DL (ref 31–37)
MCV RBC AUTO: 82.7 FL (ref 78–100)
MONOCYTES # BLD: 0.46 K/UL (ref 0.05–1.2)
MONOCYTES NFR BLD: 7.2 % (ref 3–10)
NEUTS SEG # BLD: 3.42 K/UL (ref 1.8–8)
NEUTS SEG NFR BLD: 53.3 % (ref 40–73)
NITRITE UR QL STRIP.AUTO: NEGATIVE
NRBC # BLD: 0 K/UL (ref 0–0.01)
NRBC BLD-RTO: 0 PER 100 WBC
PH UR STRIP: 6.5 (ref 5–8)
PLATELET # BLD AUTO: 325 K/UL (ref 135–420)
PMV BLD AUTO: 11.1 FL (ref 9.2–11.8)
POTASSIUM SERPL-SCNC: 3.2 MMOL/L (ref 3.5–5.5)
PROT SERPL-MCNC: 7.5 G/DL (ref 6.4–8.2)
PROT UR STRIP-MCNC: NEGATIVE MG/DL
RBC # BLD AUTO: 4.51 M/UL (ref 4.2–5.3)
SODIUM SERPL-SCNC: 143 MMOL/L (ref 136–145)
SP GR UR REFRACTOMETRY: >1.03 (ref 1–1.03)
TROPONIN T SERPL HS-MCNC: 8.2 NG/L (ref 0–14)
UROBILINOGEN UR QL STRIP.AUTO: 1 EU/DL (ref 0.2–1)
WBC # BLD AUTO: 6.4 K/UL (ref 4.6–13.2)

## 2025-07-20 PROCEDURE — 85025 COMPLETE CBC W/AUTO DIFF WBC: CPT

## 2025-07-20 PROCEDURE — 83690 ASSAY OF LIPASE: CPT

## 2025-07-20 PROCEDURE — 81003 URINALYSIS AUTO W/O SCOPE: CPT

## 2025-07-20 PROCEDURE — 93010 ELECTROCARDIOGRAM REPORT: CPT | Performed by: INTERNAL MEDICINE

## 2025-07-20 PROCEDURE — 96361 HYDRATE IV INFUSION ADD-ON: CPT

## 2025-07-20 PROCEDURE — 6360000002 HC RX W HCPCS

## 2025-07-20 PROCEDURE — 99285 EMERGENCY DEPT VISIT HI MDM: CPT

## 2025-07-20 PROCEDURE — 96374 THER/PROPH/DIAG INJ IV PUSH: CPT

## 2025-07-20 PROCEDURE — 74177 CT ABD & PELVIS W/CONTRAST: CPT

## 2025-07-20 PROCEDURE — 93005 ELECTROCARDIOGRAM TRACING: CPT | Performed by: EMERGENCY MEDICINE

## 2025-07-20 PROCEDURE — 80053 COMPREHEN METABOLIC PANEL: CPT

## 2025-07-20 PROCEDURE — 96375 TX/PRO/DX INJ NEW DRUG ADDON: CPT

## 2025-07-20 PROCEDURE — 6360000004 HC RX CONTRAST MEDICATION

## 2025-07-20 PROCEDURE — 2580000003 HC RX 258

## 2025-07-20 PROCEDURE — 84484 ASSAY OF TROPONIN QUANT: CPT

## 2025-07-20 PROCEDURE — 82962 GLUCOSE BLOOD TEST: CPT

## 2025-07-20 PROCEDURE — 6370000000 HC RX 637 (ALT 250 FOR IP)

## 2025-07-20 RX ORDER — DICYCLOMINE HYDROCHLORIDE 10 MG/1
10 CAPSULE ORAL
Qty: 28 CAPSULE | Refills: 0 | Status: SHIPPED | OUTPATIENT
Start: 2025-07-20 | End: 2025-07-27

## 2025-07-20 RX ORDER — ALUMINUM HYDROXIDE, MAGNESIUM HYDROXIDE, SIMETHICONE 400; 400; 40 MG/10ML; MG/10ML; MG/10ML
5 SUSPENSION ORAL 3 TIMES DAILY PRN
Qty: 355 ML | Refills: 0 | Status: SHIPPED | OUTPATIENT
Start: 2025-07-20 | End: 2025-07-27

## 2025-07-20 RX ORDER — ONDANSETRON 2 MG/ML
4 INJECTION INTRAMUSCULAR; INTRAVENOUS
Status: COMPLETED | OUTPATIENT
Start: 2025-07-20 | End: 2025-07-20

## 2025-07-20 RX ORDER — POTASSIUM CHLORIDE 1500 MG/1
40 TABLET, EXTENDED RELEASE ORAL
Status: COMPLETED | OUTPATIENT
Start: 2025-07-20 | End: 2025-07-20

## 2025-07-20 RX ORDER — DICYCLOMINE HYDROCHLORIDE 10 MG/1
20 CAPSULE ORAL
Status: COMPLETED | OUTPATIENT
Start: 2025-07-20 | End: 2025-07-20

## 2025-07-20 RX ORDER — MORPHINE SULFATE 2 MG/ML
2 INJECTION, SOLUTION INTRAMUSCULAR; INTRAVENOUS
Refills: 0 | Status: COMPLETED | OUTPATIENT
Start: 2025-07-20 | End: 2025-07-20

## 2025-07-20 RX ORDER — ONDANSETRON 4 MG/1
4 TABLET, ORALLY DISINTEGRATING ORAL 3 TIMES DAILY PRN
Qty: 21 TABLET | Refills: 0 | Status: SHIPPED | OUTPATIENT
Start: 2025-07-20 | End: 2025-07-27

## 2025-07-20 RX ORDER — IOPAMIDOL 612 MG/ML
100 INJECTION, SOLUTION INTRAVASCULAR
Status: COMPLETED | OUTPATIENT
Start: 2025-07-20 | End: 2025-07-20

## 2025-07-20 RX ORDER — 0.9 % SODIUM CHLORIDE 0.9 %
1000 INTRAVENOUS SOLUTION INTRAVENOUS ONCE
Status: COMPLETED | OUTPATIENT
Start: 2025-07-20 | End: 2025-07-20

## 2025-07-20 RX ADMIN — IOPAMIDOL 100 ML: 612 INJECTION, SOLUTION INTRAVENOUS at 11:29

## 2025-07-20 RX ADMIN — LIDOCAINE HYDROCHLORIDE 40 ML: 20 SOLUTION ORAL at 13:23

## 2025-07-20 RX ADMIN — DICYCLOMINE HYDROCHLORIDE 20 MG: 10 CAPSULE ORAL at 13:23

## 2025-07-20 RX ADMIN — SODIUM CHLORIDE 1000 ML: 0.9 INJECTION, SOLUTION INTRAVENOUS at 09:59

## 2025-07-20 RX ADMIN — POTASSIUM CHLORIDE 40 MEQ: 1500 TABLET, EXTENDED RELEASE ORAL at 14:10

## 2025-07-20 RX ADMIN — MORPHINE SULFATE 2 MG: 2 INJECTION, SOLUTION INTRAMUSCULAR; INTRAVENOUS at 10:00

## 2025-07-20 RX ADMIN — ONDANSETRON 4 MG: 2 INJECTION, SOLUTION INTRAMUSCULAR; INTRAVENOUS at 09:59

## 2025-07-20 ASSESSMENT — PAIN - FUNCTIONAL ASSESSMENT: PAIN_FUNCTIONAL_ASSESSMENT: 0-10

## 2025-07-20 ASSESSMENT — PAIN SCALES - GENERAL
PAINLEVEL_OUTOF10: 5
PAINLEVEL_OUTOF10: 10

## 2025-07-20 ASSESSMENT — PAIN DESCRIPTION - ORIENTATION: ORIENTATION: OTHER (COMMENT)

## 2025-07-20 ASSESSMENT — PAIN DESCRIPTION - LOCATION: LOCATION: ABDOMEN

## 2025-07-20 ASSESSMENT — PAIN DESCRIPTION - DESCRIPTORS: DESCRIPTORS: BURNING

## 2025-07-20 NOTE — DISCHARGE INSTRUCTIONS
You present to the emergency department for evaluation of nausea vomiting abdominal pain.  We discussed lab and diagnostic studies.  I recommend that you practice supportive care with zofran, bentyl 30 minutes before eating, drinking.  Please try to practice oral hydration follow-up with your primary care doctor for repeat evaluation and return to the ER sooner for new or worsening symptoms.

## 2025-07-20 NOTE — ED PROVIDER NOTES
[facility] EMERGENCY DEPARTMENT  EMERGENCY DEPARTMENT ENCOUNTER        Pt Name: Chantelle Diggs  MRN: [id number]  Birthdate 1955  Date of evaluation: 2025  Provider: Mirela Talamantes PA-C  PCP: Shiloh Bianchi APRN - NP  Note Started: 11:01 AM EDT 25      MILKA. I have evaluated this patient.        CHIEF COMPLAINT       Chief Complaint   Patient presents with    Abdominal Pain    Vomiting    Nausea       HISTORY OF PRESENT ILLNESS: 1 or more Elements     History From: Patient    {Limitations to history (Optional):98677}    {Social Determinants Significantly Affecting Health (Optional):25237}    Chief Complaint: Abdominal pain    Chantelle Diggs is a 70 y.o. female who presents with past medical history of diabetes mellitus, GERD, spinal surgery in May 2025 for obesity with complaints of abdominal pain nausea vomiting for the past 1 week.  She states that she has been nauseous every time she eats she denies metathesis.  She states that she has had generalized upper and right-sided abdominal pain.  She states that she has a history of cholecystectomy and .  She also has a hiatal hernia umbilical hernia inguinal hernia.  She denies any diarrhea constipation.  She denies fever, chills.  She has not any known sick contacts or recent travel or known foodborne exposure.    Nursing Notes were all reviewed and agreed with or any disagreements were addressed in the HPI.    REVIEW OF SYSTEMS :      Review of Systems   Constitutional: Negative.    HENT: Negative.     Eyes: Negative.    Respiratory: Negative.     Gastrointestinal:  Positive for abdominal pain, nausea and vomiting.   Endocrine: Negative.    Genitourinary: Negative.    Musculoskeletal: Negative.    Neurological: Negative.    Psychiatric/Behavioral: Negative.         Positives and Pertinent negatives as per HPI.     SURGICAL HISTORY     Past Surgical History:   Procedure Laterality Date    CATARACT REMOVAL Left 2016

## 2025-07-20 NOTE — ED TRIAGE NOTES
Pt came ambulatory to triage c/o generalized abdominal, vomiting and nausea x 1wk.  Denies diarrhea. Denies urinary symptoms.    Hx of DM, HTN, GERD.    BG in triage 208

## (undated) DEVICE — CANNULA ORIG TL CLR W FOAM CUSHIONS AND 14FT SUPL TB 3 CHN

## (undated) DEVICE — SOLUTION IRRIG 1000ML H2O STRL BLT

## (undated) DEVICE — GOWN PLASTIC FILM THMBHKS UNIV BLUE: Brand: CARDINAL HEALTH

## (undated) DEVICE — GAUZE,SPONGE,4"X4",16PLY,STRL,LF,10/TRAY: Brand: MEDLINE

## (undated) DEVICE — SYRINGE MED 10ML LUERLOCK TIP W/O SFTY DISP

## (undated) DEVICE — TRAP SPEC POLYP REM STRNR CLN DSGN MAGNIFYING WIND DISP

## (undated) DEVICE — SYRINGE MED 50ML LUERSLIP TIP

## (undated) DEVICE — MEDI-VAC NON-CONDUCTIVE SUCTION TUBING: Brand: CARDINAL HEALTH

## (undated) DEVICE — ENDOSCOPY PUMP TUBING/ CAP SET: Brand: ERBE

## (undated) DEVICE — CANNULA NSL AD TBNG L14FT STD PVC O2 CRV CONN NONFLARED NSL

## (undated) DEVICE — SNARE VASC L240CM LOOP W10MM SHTH DIA2.4MM RND STIFF CLD

## (undated) DEVICE — BASIN EMSIS 16OZ GRAPHITE PLAS KID SHP MOLD GRAD FOR ORAL

## (undated) DEVICE — CATHETER SUCT TR FL TIP 14FR W/ O CTRL

## (undated) DEVICE — SYRINGE MED 25GA 3ML L5/8IN SUBQ PLAS W/ DETACH NDL SFTY

## (undated) DEVICE — LINER SUCT CANSTR 3000CC PLAS SFT PRE ASSEMB W/OUT TBNG W/

## (undated) DEVICE — FORCEPS BX L240CM JAW DIA2.4MM ORNG L CAP W/ NDL DISP RAD

## (undated) DEVICE — UNDERPAD INCONT W23XL36IN STD BLU POLYPR BK FLUF SFT

## (undated) DEVICE — YANKAUER,SMOOTH HANDLE,HIGH CAPACITY: Brand: MEDLINE INDUSTRIES, INC.

## (undated) DEVICE — Device: Brand: DISPOSABLE ELECTROSURGICAL SNARE

## (undated) DEVICE — SYRINGE 20ML LL S/C 50

## (undated) DEVICE — BITE BLOCK ENDOSCP UNIV AD 6 TO 9.4 MM

## (undated) DEVICE — 3M™ UNIVERSAL ELECTROSURGICAL PLATE, SPLIT, UNCORDED 9160: Brand: 3M™

## (undated) DEVICE — CLIP LIG L235CM RESOL 360 BX/20

## (undated) DEVICE — Device: Brand: SINGLE USE INJECTOR NM600/610

## (undated) DEVICE — STERILE POLYISOPRENE POWDER-FREE SURGICAL GLOVES: Brand: PROTEXIS